# Patient Record
Sex: FEMALE | Race: WHITE | Employment: FULL TIME | URBAN - METROPOLITAN AREA
[De-identification: names, ages, dates, MRNs, and addresses within clinical notes are randomized per-mention and may not be internally consistent; named-entity substitution may affect disease eponyms.]

---

## 2017-06-01 ENCOUNTER — OFFICE VISIT (OUTPATIENT)
Dept: PRIMARY CARE CLINIC | Age: 42
End: 2017-06-01

## 2017-06-01 VITALS
HEART RATE: 72 BPM | RESPIRATION RATE: 14 BRPM | DIASTOLIC BLOOD PRESSURE: 60 MMHG | TEMPERATURE: 98.1 F | BODY MASS INDEX: 21.71 KG/M2 | SYSTOLIC BLOOD PRESSURE: 106 MMHG | HEIGHT: 62 IN | WEIGHT: 118 LBS

## 2017-06-01 DIAGNOSIS — K58.9 IRRITABLE BOWEL SYNDROME, UNSPECIFIED TYPE: ICD-10-CM

## 2017-06-01 DIAGNOSIS — K29.60 EROSIVE GASTRITIS: ICD-10-CM

## 2017-06-01 DIAGNOSIS — K64.8 INTERNAL HEMORRHOIDS: ICD-10-CM

## 2017-06-01 DIAGNOSIS — F41.9 ANXIETY: Primary | ICD-10-CM

## 2017-06-01 DIAGNOSIS — M79.7 FIBROMYALGIA: ICD-10-CM

## 2017-06-01 DIAGNOSIS — K76.0 FATTY LIVER: ICD-10-CM

## 2017-06-01 DIAGNOSIS — K86.89 PANCREATIC MASS: ICD-10-CM

## 2017-06-01 DIAGNOSIS — Z72.0 TOBACCO ABUSE: ICD-10-CM

## 2017-06-01 PROCEDURE — 99203 OFFICE O/P NEW LOW 30 MIN: CPT | Performed by: FAMILY MEDICINE

## 2017-06-01 RX ORDER — PANTOPRAZOLE SODIUM 40 MG/1
40 TABLET, DELAYED RELEASE ORAL DAILY
Qty: 30 TABLET | Refills: 11 | Status: SHIPPED | OUTPATIENT
Start: 2017-06-01 | End: 2018-07-10 | Stop reason: SDUPTHER

## 2017-06-01 RX ORDER — DULOXETIN HYDROCHLORIDE 60 MG/1
60 CAPSULE, DELAYED RELEASE ORAL DAILY
Qty: 90 CAPSULE | Refills: 3 | Status: SHIPPED | OUTPATIENT
Start: 2017-06-01 | End: 2017-06-29

## 2017-06-01 RX ORDER — DULOXETIN HYDROCHLORIDE 30 MG/1
30 CAPSULE, DELAYED RELEASE ORAL DAILY
Qty: 7 CAPSULE | Refills: 0 | Status: SHIPPED | OUTPATIENT
Start: 2017-06-01 | End: 2017-06-29 | Stop reason: SDUPTHER

## 2017-06-01 ASSESSMENT — ENCOUNTER SYMPTOMS
JAUNDICE: 0
TENESMUS: 0
HEMATEMESIS: 0
NAUSEA: 0
VOMITING: 0
ODYNOPHAGIA: 0
CONSTIPATION: 0
HEMATOCHEZIA: 0
ABDOMINAL PAIN: 0
DIARRHEA: 0
BACK PAIN: 0
BLOATING: 0

## 2017-06-01 ASSESSMENT — PATIENT HEALTH QUESTIONNAIRE - PHQ9
SUM OF ALL RESPONSES TO PHQ9 QUESTIONS 1 & 2: 2
2. FEELING DOWN, DEPRESSED OR HOPELESS: 1
SUM OF ALL RESPONSES TO PHQ QUESTIONS 1-9: 2
1. LITTLE INTEREST OR PLEASURE IN DOING THINGS: 1

## 2017-06-29 ENCOUNTER — OFFICE VISIT (OUTPATIENT)
Dept: PRIMARY CARE CLINIC | Age: 42
End: 2017-06-29

## 2017-06-29 VITALS
DIASTOLIC BLOOD PRESSURE: 52 MMHG | SYSTOLIC BLOOD PRESSURE: 88 MMHG | TEMPERATURE: 98.1 F | WEIGHT: 116 LBS | BODY MASS INDEX: 22.78 KG/M2 | RESPIRATION RATE: 12 BRPM | HEIGHT: 60 IN | HEART RATE: 64 BPM

## 2017-06-29 DIAGNOSIS — K64.8 INTERNAL HEMORRHOIDS: ICD-10-CM

## 2017-06-29 DIAGNOSIS — K29.60 EROSIVE GASTRITIS: ICD-10-CM

## 2017-06-29 DIAGNOSIS — K58.1 IRRITABLE BOWEL SYNDROME WITH CONSTIPATION: ICD-10-CM

## 2017-06-29 DIAGNOSIS — F41.9 ANXIETY: Primary | ICD-10-CM

## 2017-06-29 DIAGNOSIS — K86.89 PANCREATIC MASS: ICD-10-CM

## 2017-06-29 PROCEDURE — 99214 OFFICE O/P EST MOD 30 MIN: CPT | Performed by: FAMILY MEDICINE

## 2017-06-29 RX ORDER — DULOXETIN HYDROCHLORIDE 30 MG/1
30 CAPSULE, DELAYED RELEASE ORAL DAILY
Qty: 7 CAPSULE | Refills: 0 | Status: SHIPPED | OUTPATIENT
Start: 2017-06-29 | End: 2017-08-01 | Stop reason: ALTCHOICE

## 2017-06-29 RX ORDER — CITALOPRAM 40 MG/1
40 TABLET ORAL DAILY
Qty: 30 TABLET | Refills: 5 | Status: SHIPPED | OUTPATIENT
Start: 2017-06-29 | End: 2017-12-23 | Stop reason: SDUPTHER

## 2017-06-29 RX ORDER — PREGABALIN 75 MG/1
75 CAPSULE ORAL 2 TIMES DAILY
Qty: 60 CAPSULE | Refills: 3 | Status: SHIPPED | OUTPATIENT
Start: 2017-06-29 | End: 2017-11-09 | Stop reason: SDUPTHER

## 2017-06-29 ASSESSMENT — ENCOUNTER SYMPTOMS
EYE DISCHARGE: 0
DIARRHEA: 0
APNEA: 0
CHEST TIGHTNESS: 0
COLOR CHANGE: 0
STRIDOR: 0
NAUSEA: 0
EYE PAIN: 0
PHOTOPHOBIA: 0
EYE REDNESS: 0
FACIAL SWELLING: 0
FLATUS: 0
CONSTIPATION: 1
HEMATOCHEZIA: 0
BELCHING: 0
ABDOMINAL PAIN: 1
VOMITING: 0
CHOKING: 0
WHEEZING: 0

## 2017-07-07 ENCOUNTER — OFFICE VISIT (OUTPATIENT)
Dept: GASTROENTEROLOGY | Age: 42
End: 2017-07-07

## 2017-07-07 VITALS
OXYGEN SATURATION: 97 % | HEART RATE: 65 BPM | HEIGHT: 60 IN | TEMPERATURE: 97.9 F | BODY MASS INDEX: 23.56 KG/M2 | RESPIRATION RATE: 14 BRPM | WEIGHT: 120 LBS | SYSTOLIC BLOOD PRESSURE: 90 MMHG | DIASTOLIC BLOOD PRESSURE: 60 MMHG

## 2017-07-07 DIAGNOSIS — Z87.11 HISTORY OF PEPTIC ULCER DISEASE: ICD-10-CM

## 2017-07-07 DIAGNOSIS — K58.1 IRRITABLE BOWEL SYNDROME WITH CONSTIPATION: ICD-10-CM

## 2017-07-07 DIAGNOSIS — K86.9 LESION OF PANCREAS: Primary | ICD-10-CM

## 2017-07-07 PROCEDURE — 99204 OFFICE O/P NEW MOD 45 MIN: CPT | Performed by: INTERNAL MEDICINE

## 2017-07-07 RX ORDER — DIPHENHYDRAMINE HCL 25 MG
25 TABLET ORAL EVERY 6 HOURS PRN
COMMUNITY
End: 2019-01-31

## 2017-07-07 ASSESSMENT — ENCOUNTER SYMPTOMS
DIARRHEA: 0
EYES NEGATIVE: 1
RECTAL PAIN: 0
ABDOMINAL PAIN: 0
VOMITING: 0
BLOOD IN STOOL: 0
ANAL BLEEDING: 0
RESPIRATORY NEGATIVE: 1
NAUSEA: 0
CONSTIPATION: 0
ABDOMINAL DISTENTION: 0

## 2017-08-01 ENCOUNTER — OFFICE VISIT (OUTPATIENT)
Dept: PRIMARY CARE CLINIC | Age: 42
End: 2017-08-01

## 2017-08-01 VITALS
OXYGEN SATURATION: 98 % | DIASTOLIC BLOOD PRESSURE: 62 MMHG | WEIGHT: 116 LBS | TEMPERATURE: 97.8 F | RESPIRATION RATE: 16 BRPM | BODY MASS INDEX: 22.78 KG/M2 | HEIGHT: 60 IN | SYSTOLIC BLOOD PRESSURE: 94 MMHG | HEART RATE: 82 BPM

## 2017-08-01 DIAGNOSIS — R10.84 GENERALIZED ABDOMINAL PAIN: ICD-10-CM

## 2017-08-01 DIAGNOSIS — K58.1 IRRITABLE BOWEL SYNDROME WITH CONSTIPATION: Primary | ICD-10-CM

## 2017-08-01 DIAGNOSIS — K86.89 PANCREATIC MASS: ICD-10-CM

## 2017-08-01 DIAGNOSIS — R11.0 NAUSEA: ICD-10-CM

## 2017-08-01 DIAGNOSIS — Z72.0 TOBACCO ABUSE: ICD-10-CM

## 2017-08-01 DIAGNOSIS — F41.9 ANXIETY: ICD-10-CM

## 2017-08-01 PROCEDURE — 99214 OFFICE O/P EST MOD 30 MIN: CPT | Performed by: FAMILY MEDICINE

## 2017-08-01 RX ORDER — ONDANSETRON 4 MG/1
4 TABLET, ORALLY DISINTEGRATING ORAL EVERY 8 HOURS PRN
Qty: 30 TABLET | Refills: 3 | Status: SHIPPED | OUTPATIENT
Start: 2017-08-01 | End: 2017-12-27 | Stop reason: SDUPTHER

## 2017-08-01 ASSESSMENT — ENCOUNTER SYMPTOMS
APNEA: 0
STRIDOR: 0
WHEEZING: 0
EYE PAIN: 0
EYE REDNESS: 0
FLATUS: 0
CHEST TIGHTNESS: 0
ABDOMINAL PAIN: 1
HEMATOCHEZIA: 0
DIARRHEA: 0
NAUSEA: 0
VOMITING: 0
PHOTOPHOBIA: 0
CHOKING: 0
COLOR CHANGE: 0
BELCHING: 0
CONSTIPATION: 0
FACIAL SWELLING: 0
EYE DISCHARGE: 0

## 2017-09-18 ENCOUNTER — OFFICE VISIT (OUTPATIENT)
Dept: PRIMARY CARE CLINIC | Age: 42
End: 2017-09-18

## 2017-09-18 VITALS
SYSTOLIC BLOOD PRESSURE: 90 MMHG | HEART RATE: 76 BPM | RESPIRATION RATE: 12 BRPM | BODY MASS INDEX: 21.6 KG/M2 | WEIGHT: 110 LBS | DIASTOLIC BLOOD PRESSURE: 62 MMHG | TEMPERATURE: 98.3 F | HEIGHT: 60 IN

## 2017-09-18 DIAGNOSIS — K58.1 IRRITABLE BOWEL SYNDROME WITH CONSTIPATION: ICD-10-CM

## 2017-09-18 DIAGNOSIS — Z23 NEED FOR INFLUENZA VACCINATION: ICD-10-CM

## 2017-09-18 DIAGNOSIS — K29.51 CHRONIC GASTRITIS WITH BLEEDING, UNSPECIFIED GASTRITIS TYPE: ICD-10-CM

## 2017-09-18 DIAGNOSIS — Z72.0 TOBACCO ABUSE: ICD-10-CM

## 2017-09-18 DIAGNOSIS — R10.84 GENERALIZED ABDOMINAL PAIN: ICD-10-CM

## 2017-09-18 DIAGNOSIS — M54.42 ACUTE MIDLINE LOW BACK PAIN WITH LEFT-SIDED SCIATICA: ICD-10-CM

## 2017-09-18 DIAGNOSIS — R11.0 NAUSEA: ICD-10-CM

## 2017-09-18 DIAGNOSIS — J40 BRONCHITIS: Primary | ICD-10-CM

## 2017-09-18 DIAGNOSIS — R05.8 PRODUCTIVE COUGH: ICD-10-CM

## 2017-09-18 DIAGNOSIS — R63.4 WEIGHT LOSS: ICD-10-CM

## 2017-09-18 PROCEDURE — 99214 OFFICE O/P EST MOD 30 MIN: CPT | Performed by: FAMILY MEDICINE

## 2017-09-18 PROCEDURE — 90688 IIV4 VACCINE SPLT 0.5 ML IM: CPT | Performed by: FAMILY MEDICINE

## 2017-09-18 PROCEDURE — 90471 IMMUNIZATION ADMIN: CPT | Performed by: FAMILY MEDICINE

## 2017-09-18 RX ORDER — CEFUROXIME AXETIL 500 MG/1
500 TABLET ORAL 2 TIMES DAILY
Qty: 20 TABLET | Refills: 0 | Status: SHIPPED | OUTPATIENT
Start: 2017-09-18 | End: 2017-09-28

## 2017-09-18 ASSESSMENT — ENCOUNTER SYMPTOMS
EYE DISCHARGE: 0
WHEEZING: 1
COLOR CHANGE: 0
EYE PAIN: 0
NAUSEA: 1
BACK PAIN: 1
BOWEL INCONTINENCE: 0
HEMOPTYSIS: 0
RHINORRHEA: 0
SORE THROAT: 0
STRIDOR: 0
EYE REDNESS: 0
CHEST TIGHTNESS: 1
CHOKING: 0
PHOTOPHOBIA: 0
ABDOMINAL PAIN: 0
DIARRHEA: 0
CONSTIPATION: 0
FACIAL SWELLING: 0
APNEA: 0
COUGH: 1
HEARTBURN: 0
SHORTNESS OF BREATH: 1

## 2017-09-27 ENCOUNTER — HOSPITAL ENCOUNTER (OUTPATIENT)
Dept: CT IMAGING | Age: 42
Discharge: HOME | End: 2017-09-27
Payer: COMMERCIAL

## 2017-09-27 VITALS
HEIGHT: 60 IN | DIASTOLIC BLOOD PRESSURE: 60 MMHG | SYSTOLIC BLOOD PRESSURE: 93 MMHG | HEART RATE: 60 BPM | BODY MASS INDEX: 21.4 KG/M2 | WEIGHT: 109 LBS

## 2017-09-27 DIAGNOSIS — J40 BRONCHITIS: ICD-10-CM

## 2017-09-27 DIAGNOSIS — R11.0 NAUSEA: ICD-10-CM

## 2017-09-27 DIAGNOSIS — R10.84 GENERALIZED ABDOMINAL PAIN: ICD-10-CM

## 2017-09-27 DIAGNOSIS — R05.8 PRODUCTIVE COUGH: ICD-10-CM

## 2017-09-27 DIAGNOSIS — K86.89 PANCREATIC MASS: ICD-10-CM

## 2017-09-27 DIAGNOSIS — K58.1 IRRITABLE BOWEL SYNDROME WITH CONSTIPATION: ICD-10-CM

## 2017-09-27 DIAGNOSIS — R63.4 WEIGHT LOSS: ICD-10-CM

## 2017-09-27 PROCEDURE — 71260 CT THORAX DX C+: CPT

## 2017-09-27 PROCEDURE — 74177 CT ABD & PELVIS W/CONTRAST: CPT

## 2017-09-27 PROCEDURE — 2500000003 HC RX 250 WO HCPCS: Performed by: RADIOLOGY

## 2017-09-27 PROCEDURE — 6360000004 HC RX CONTRAST MEDICATION: Performed by: RADIOLOGY

## 2017-09-27 PROCEDURE — 2580000003 HC RX 258: Performed by: RADIOLOGY

## 2017-09-27 PROCEDURE — 71270 CT THORAX DX C-/C+: CPT

## 2017-09-27 RX ORDER — SODIUM CHLORIDE 0.9 % (FLUSH) 0.9 %
10 SYRINGE (ML) INJECTION ONCE
Status: COMPLETED | OUTPATIENT
Start: 2017-09-27 | End: 2017-09-27

## 2017-09-27 RX ADMIN — IOPAMIDOL 100 ML: 755 INJECTION, SOLUTION INTRAVENOUS at 16:40

## 2017-09-27 RX ADMIN — SODIUM CHLORIDE, PRESERVATIVE FREE 10 ML: 5 INJECTION INTRAVENOUS at 16:40

## 2017-09-27 RX ADMIN — BARIUM SULFATE 450 ML: 21 SUSPENSION ORAL at 16:40

## 2017-09-28 ENCOUNTER — OFFICE VISIT (OUTPATIENT)
Dept: GASTROENTEROLOGY | Age: 42
End: 2017-09-28

## 2017-09-28 VITALS
HEIGHT: 60 IN | DIASTOLIC BLOOD PRESSURE: 60 MMHG | OXYGEN SATURATION: 99 % | WEIGHT: 113 LBS | HEART RATE: 59 BPM | RESPIRATION RATE: 12 BRPM | BODY MASS INDEX: 22.19 KG/M2 | SYSTOLIC BLOOD PRESSURE: 90 MMHG

## 2017-09-28 DIAGNOSIS — R11.0 NAUSEA: ICD-10-CM

## 2017-09-28 DIAGNOSIS — K86.89 PANCREATIC MASS: Primary | ICD-10-CM

## 2017-09-28 DIAGNOSIS — K86.89 PANCREATIC MASS: ICD-10-CM

## 2017-09-28 DIAGNOSIS — K58.1 IRRITABLE BOWEL SYNDROME WITH CONSTIPATION: ICD-10-CM

## 2017-09-28 LAB
AMYLASE: 20 U/L (ref 28–100)
HCT VFR BLD CALC: 43.1 % (ref 37–47)
HEMOGLOBIN: 14.1 G/DL (ref 12–16)
LIPASE: 19 U/L (ref 13–60)
MCH RBC QN AUTO: 29.6 PG (ref 27–31.3)
MCHC RBC AUTO-ENTMCNC: 32.6 % (ref 33–37)
MCV RBC AUTO: 90.8 FL (ref 82–100)
PDW BLD-RTO: 14.2 % (ref 11.5–14.5)
PLATELET # BLD: 197 K/UL (ref 130–400)
RBC # BLD: 4.74 M/UL (ref 4.2–5.4)
WBC # BLD: 9 K/UL (ref 4.8–10.8)

## 2017-09-28 PROCEDURE — 99214 OFFICE O/P EST MOD 30 MIN: CPT | Performed by: PHYSICIAN ASSISTANT

## 2017-09-28 ASSESSMENT — ENCOUNTER SYMPTOMS
CONSTIPATION: 1
ABDOMINAL PAIN: 1

## 2017-09-28 NOTE — MR AVS SNAPSHOT
Linaclotide (LINZESS) 72 MCG CAPS Take 1 capsule by mouth daily as needed (constipation)    ondansetron (ZOFRAN ODT) 4 MG disintegrating tablet Take 1 tablet by mouth every 8 hours as needed for Nausea or Vomiting    diphenhydrAMINE (BENADRYL) 25 MG tablet Take 25 mg by mouth every 6 hours as needed for Itching    pregabalin (LYRICA) 75 MG capsule Take 1 capsule by mouth 2 times daily    citalopram (CELEXA) 40 MG tablet Take 1 tablet by mouth daily    pantoprazole (PROTONIX) 40 MG tablet Take 1 tablet by mouth daily      Allergies              Asa [Aspirin]     Demerol Hcl [Meperidine]     Morphine     Nsaids     Other     All oral narcotics    Xanax [Alprazolam]     Zoloft [Sertraline Hcl]       We Ordered/Performed the following           Comprehensive Metabolic Panel          Additional Information        Basic Information     Date Of Birth Sex Race Ethnicity Preferred Language    1975 Female White Non-/Non  English      Problem List as of 9/28/2017                 Pancreatic mass, 7mm    Fatty liver    Tobacco abuse    Internal hemorrhoids    Erosive gastritis    Irritable bowel syndrome      Immunizations as of 9/28/2017     Name Date    Influenza, Tavernier Stefaniey, 3 Years and older, IM 9/18/2017      Preventive Care        Date Due    HIV screening is recommended for all people regardless of risk factors  aged 15-65 years at least once (lifetime) who have never been HIV tested. 12/19/1990    Tetanus Combination Vaccine (1 - Tdap) 12/19/1994    Pneumococcal Vaccine - Pneumovax for adults aged 19-64 years with: chronic heart disease, chronic lung disease, diabetes mellitus, alcoholism, chronic liver disease, or cigarette smoking. (1 of 1 - PPSV23) 12/19/1994    Pap Smear 12/19/1996    Cholesterol Screening 12/19/2015            MyChart Signup           Our records indicate that you have an active [employer] account.     You can view your After Visit Summary by going to [email or web address] and logging in with your M[employer] username and password. If you don't have a M[employer] username and password but a parent or guardian has access to your record, the parent or guardian should login with their own M[employer] username and password and access your record to view the After Visit Summary. Additional Information  If you have questions, please contact the physician practice where you receive care. Remember, M[employer] is NOT to be used for urgent needs. For medical emergencies, dial 911. For questions regarding your M[employer] account call [phone]. If you have a clinical question, please call your doctor's office.

## 2017-09-28 NOTE — PROGRESS NOTES
Problem List   Diagnosis    Pancreatic mass, 7mm    Fatty liver    Tobacco abuse    Internal hemorrhoids    Erosive gastritis    Irritable bowel syndrome    Nausea     Past Medical History:   Diagnosis Date    Anxiety     Attempted [facility] ([facility] [address]     Depression     Diverticulosis     Erosive gastritis 6/1/2017    Fatty liver     Fatty pancreas     Fibromyalgia     Gastritis     Internal hemorrhoids 6/1/2017    Irritable bowel syndrome     Osteoarthritis     Pancreatic lesion     Pancreatic mass, 7mm 6/1/2017    Past heart attack     Tobacco abuse 6/1/2017     Past Surgical History:   Procedure Laterality Date    BREAST LUMPECTOMY Bilateral     CHOLECYSTECTOMY      COLONOSCOPY      HYSTERECTOMY      TUBAL LIGATION      UPPER GASTROINTESTINAL ENDOSCOPY      WISDOM TOOTH EXTRACTION       Social History     Social History    Marital status: Single     Spouse name: N/A    Number of children: N/A    Years of education: N/A     Occupational History    Not on file. Social History Main Topics    Smoking status: Current Every Day Smoker     Packs/day: 0.50    Smokeless tobacco: Never Used    Alcohol use No    Drug use: Yes     Special: Marijuana    Sexual activity: Not on file     Other Topics Concern    Not on file     Social History Narrative     Family History   Problem Relation Age of Onset    Cancer Mother      ovarian    Asthma Mother     Other Mother      copd,emphysema    Cancer Maternal Grandmother      ovarian    Asthma Maternal Grandmother     Other Maternal Grandmother      copd,emphysema     Allergies:  Asa [aspirin]; Demerol hcl [meperidine]; Morphine; Nsaids;  Other; Xanax [alprazolam]; and Zoloft [sertraline hcl]  Current Outpatient Prescriptions   Medication Sig Dispense Refill    Linaclotide (LINZESS) 72 MCG CAPS Take 1 capsule by mouth daily as needed (constipation) 30 capsule 3    ondansetron (ZOFRAN ODT) 4 MG disintegrating tablet Take 1 tablet by mouth every 8 hours as needed for Nausea or Vomiting 30 tablet 3    diphenhydrAMINE (BENADRYL) 25 MG tablet Take 25 mg by mouth every 6 hours as needed for Itching      pregabalin (LYRICA) 75 MG capsule Take 1 capsule by mouth 2 times daily 60 capsule 3    citalopram (CELEXA) 40 MG tablet Take 1 tablet by mouth daily 30 tablet 5    pantoprazole (PROTONIX) 40 MG tablet Take 1 tablet by mouth daily 30 tablet 11     No current facility-administered medications for this visit. Review of Systems   Constitutional: Positive for weight loss. Negative for chills, diaphoresis, fatigue, fever and unexpected weight change. Respiratory: Negative for apnea, cough, chest tightness, shortness of breath and wheezing. Cardiovascular: Negative for chest pain, palpitations and leg swelling. Gastrointestinal: Positive for abdominal pain, anorexia, constipation and nausea. Negative for abdominal distention, anal bleeding, blood in stool, diarrhea, hematochezia, melena, rectal pain and vomiting. Skin: Negative for color change, pallor, rash and wound. Neurological: Negative for dizziness, tremors, seizures, syncope, speech difficulty, weakness, light-headedness and headaches. Psychiatric/Behavioral: Negative for agitation and confusion. The patient is nervous/anxious. All other systems reviewed and are negative. Objective:     Vitals:  BP 90/60  Pulse 59  Resp 12  Ht 5' (1.524 m)  Wt 113 lb (51.3 kg)  SpO2 99%  BMI 22.07 kg/m2  Body mass index is 22.07 kg/(m^2). Physical Exam   Constitutional: She is oriented to person, place, and time. She appears well-developed and well-nourished. No distress. HENT:   Head: Normocephalic and atraumatic. Neck: Normal range of motion. Neck supple. No JVD present. No tracheal deviation present. No thyromegaly present. Cardiovascular: Normal rate, regular rhythm and normal heart sounds.     Pulmonary/Chest: Effort normal and breath sounds normal. No respiratory

## 2017-10-02 PROBLEM — R11.0 NAUSEA: Status: ACTIVE | Noted: 2017-10-02

## 2017-10-02 ASSESSMENT — ENCOUNTER SYMPTOMS
NAUSEA: 1
DIARRHEA: 0
WHEEZING: 0
CHEST TIGHTNESS: 0
BLOOD IN STOOL: 0
ANAL BLEEDING: 0
ABDOMINAL DISTENTION: 0
COLOR CHANGE: 0
VOMITING: 0
APNEA: 0
COUGH: 0
RECTAL PAIN: 0
SHORTNESS OF BREATH: 0
HEMATOCHEZIA: 0

## 2017-10-03 ENCOUNTER — OFFICE VISIT (OUTPATIENT)
Dept: PRIMARY CARE CLINIC | Age: 42
End: 2017-10-03

## 2017-10-03 VITALS
RESPIRATION RATE: 16 BRPM | WEIGHT: 114 LBS | SYSTOLIC BLOOD PRESSURE: 90 MMHG | DIASTOLIC BLOOD PRESSURE: 54 MMHG | BODY MASS INDEX: 22.38 KG/M2 | HEIGHT: 60 IN | HEART RATE: 64 BPM | TEMPERATURE: 98.7 F

## 2017-10-03 DIAGNOSIS — K86.89 PANCREATIC MASS: ICD-10-CM

## 2017-10-03 DIAGNOSIS — R63.4 WEIGHT LOSS: ICD-10-CM

## 2017-10-03 DIAGNOSIS — R53.83 FATIGUE, UNSPECIFIED TYPE: ICD-10-CM

## 2017-10-03 DIAGNOSIS — Z72.0 TOBACCO ABUSE: ICD-10-CM

## 2017-10-03 DIAGNOSIS — K29.60 EROSIVE GASTRITIS: ICD-10-CM

## 2017-10-03 DIAGNOSIS — K58.1 IRRITABLE BOWEL SYNDROME WITH CONSTIPATION: Primary | ICD-10-CM

## 2017-10-03 LAB
ALBUMIN SERPL-MCNC: 4.4 G/DL (ref 3.9–4.9)
ALP BLD-CCNC: 46 U/L (ref 40–130)
ALT SERPL-CCNC: 12 U/L (ref 0–33)
ANION GAP SERPL CALCULATED.3IONS-SCNC: 14 MEQ/L (ref 7–13)
AST SERPL-CCNC: 19 U/L (ref 0–35)
BILIRUB SERPL-MCNC: 0.3 MG/DL (ref 0–1.2)
BUN BLDV-MCNC: 13 MG/DL (ref 6–20)
CALCIUM SERPL-MCNC: 9.1 MG/DL (ref 8.6–10.2)
CHLORIDE BLD-SCNC: 104 MEQ/L (ref 98–107)
CO2: 23 MEQ/L (ref 22–29)
CREAT SERPL-MCNC: 0.58 MG/DL (ref 0.5–0.9)
GFR AFRICAN AMERICAN: >60
GFR NON-AFRICAN AMERICAN: >60
GLOBULIN: 2.3 G/DL (ref 2.3–3.5)
GLUCOSE BLD-MCNC: 97 MG/DL (ref 74–109)
POTASSIUM SERPL-SCNC: 4.6 MEQ/L (ref 3.5–5.1)
SODIUM BLD-SCNC: 141 MEQ/L (ref 132–144)
T4 FREE: 0.87 NG/DL (ref 0.93–1.7)
T4 TOTAL: 5.9 UG/DL (ref 4.5–11.7)
TOTAL PROTEIN: 6.7 G/DL (ref 6.4–8.1)
TSH SERPL DL<=0.05 MIU/L-ACNC: 1.96 UIU/ML (ref 0.27–4.2)

## 2017-10-03 PROCEDURE — 99213 OFFICE O/P EST LOW 20 MIN: CPT | Performed by: FAMILY MEDICINE

## 2017-10-03 ASSESSMENT — ENCOUNTER SYMPTOMS
WHEEZING: 0
EYE DISCHARGE: 0
CHEST TIGHTNESS: 0
COLOR CHANGE: 0
DIARRHEA: 0
EYE REDNESS: 0
NAUSEA: 0
CHOKING: 0
STRIDOR: 0
FACIAL SWELLING: 0
PHOTOPHOBIA: 0
CONSTIPATION: 0
APNEA: 0
ABDOMINAL PAIN: 1
EYE PAIN: 0

## 2017-10-03 NOTE — PROGRESS NOTES
 Not on file     Social History Narrative     Allergies:  Asa [aspirin]; Demerol hcl [meperidine]; Morphine; Nsaids; Other; Xanax [alprazolam]; and Zoloft [sertraline hcl]    Review of Systems   Constitutional: Positive for appetite change, fatigue and unexpected weight change. Negative for activity change, chills, diaphoresis and fever. HENT: Negative for congestion, ear discharge, ear pain, facial swelling, hearing loss and mouth sores. Eyes: Negative for photophobia, pain, discharge and redness. Respiratory: Negative for apnea, choking, chest tightness, wheezing and stridor. Cardiovascular: Negative for chest pain, palpitations and leg swelling. Gastrointestinal: Positive for abdominal pain. Negative for constipation, diarrhea and nausea. Endocrine: Negative for cold intolerance, heat intolerance, polydipsia and polyphagia. Genitourinary: Negative for dysuria and frequency. Musculoskeletal: Negative for gait problem, joint swelling, neck pain and neck stiffness. Skin: Negative for color change, pallor, rash and wound. Allergic/Immunologic: Negative for immunocompromised state. Neurological: Negative for tremors, syncope, facial asymmetry, speech difficulty and headaches. Psychiatric/Behavioral: Negative for confusion and hallucinations. The patient is not nervous/anxious and is not hyperactive. Objective:   BP (!) 90/54 (Site: Left Arm, Position: Sitting, Cuff Size: Medium Adult)  Pulse 64  Temp 98.7 °F (37.1 °C) (Oral)   Resp 16  Ht 5' (1.524 m)  Wt 114 lb (51.7 kg)  BMI 22.26 kg/m2    Physical Exam   Constitutional: She is oriented to person, place, and time. She appears well-developed and well-nourished. HENT:   Head: Normocephalic and atraumatic. Right Ear: External ear normal.   Left Ear: External ear normal.   Nose: Nose normal.   Mouth/Throat: Oropharynx is clear and moist.   Eyes: Conjunctivae and EOM are normal. Pupils are equal, round, and reactive to light. Right eye exhibits no discharge. Left eye exhibits no discharge. No scleral icterus. Neck: Normal range of motion. Neck supple. No thyromegaly present. Cardiovascular: Normal rate, regular rhythm, normal heart sounds and intact distal pulses. Exam reveals no gallop and no friction rub. No murmur heard. Pulmonary/Chest: Effort normal and breath sounds normal. No respiratory distress. She has no wheezes. She has no rales. She exhibits no tenderness. Abdominal: Soft. Bowel sounds are normal. She exhibits no distension and no mass. There is tenderness in the right upper quadrant, right lower quadrant and periumbilical area. There is no rebound and no guarding. Lymphadenopathy:     She has no cervical adenopathy. Neurological: She is alert and oriented to person, place, and time. Skin: Skin is warm and dry. Psychiatric: She has a normal mood and affect. Her behavior is normal. Judgment and thought content normal.   Nursing note and vitals reviewed. Assessment:     1. Irritable bowel syndrome with constipation     2. Erosive gastritis, EGD tomorrow w/ Dr Amy Tavarez   3. Pancreatic mass, 7mm  Comprehensive Metabolic Panel   4. Fatigue, unspecified type  Comprehensive Metabolic Panel    T4    T4, Free    TSH without Reflex   5. Weight loss  Comprehensive Metabolic Panel    T4    T4, Free    TSH without Reflex   6. Tobacco abuse         Plan:      Orders Placed This Encounter   Procedures    Comprehensive Metabolic Panel     Standing Status:   Future     Standing Expiration Date:   10/3/2018    T4     Standing Status:   Future     Standing Expiration Date:   10/3/2018    T4, Free     Standing Status:   Future     Standing Expiration Date:   10/3/2018    TSH without Reflex     Standing Status:   Future     Standing Expiration Date:   10/3/2018     No orders of the defined types were placed in this encounter.       Controlled Substances Monitoring:      No Follow-up on file.    Heather Brown CMA   , am scribing for and in the presence of Joel Hamilton DO. Electronically signed by :  Sully Mujica DO, personally performed the services described in this documentation, as scribed by Kelin Simon CMA   in my presence, and it is both accurate and complete.  Electronically signed by: Joel Hamilton DO    10/3/17 2:26 PM    Joel Hamilton DO

## 2017-10-03 NOTE — MR AVS SNAPSHOT
After Visit Summary             Kai Monroy   10/3/2017 2:15 PM   Office Visit    Description:  Female : 1975   Provider:  Anne House DO   Department:  [facility]              Your Follow-Up and Future Appointments         Below is a list of your follow-up and future appointments. This may not be a complete list as you may have made appointments directly with providers that we are not aware of or your providers may have made some for you. Please call your providers to confirm appointments. It is important to keep your appointments. Please bring your current insurance card, photo ID, co-pay, and all medication bottles to your appointment. If self-pay, payment is expected at the time of service. Your To-Do List     Future Appointments Provider Department Dept Phone    10/21/2017 10:30 AM Luís Wright DO [address] [place] [address] [phone]    Future Orders Complete By Expires    Comprehensive Metabolic Panel [FLD01 Custom]  10/3/2017 10/3/2018    T4, Free [BVN897 Custom]  10/3/2017 10/3/2018    T4 [EPN708 Custom]  10/3/2017 10/3/2018    TSH without Reflex [XLG197 Custom]  10/3/2017 10/3/2018    Follow-Up    Return for per Dr Valentin Roman.          Information from Your Visit        Department     Name Address Phone Fax    [facility] [address]      You Were Seen for:         Comments    Irritable bowel syndrome with constipation   [615421]         Vital Signs     Blood Pressure Pulse Temperature Respirations Height Weight    90/54 (Site: Left Arm, Position: Sitting, Cuff Size: Medium Adult) 64 98.7 °F (37.1 °C) (Oral) 16 5' (1.524 m) 114 lb (51.7 kg)    Body Mass Index Smoking Status                22.26 kg/m2 Current Every Day Smoker             Medications and Orders      Your Current Medications Are              Linaclotide (LINZESS) 72 MCG CAPS Take 1 capsule by mouth daily as needed (constipation) guardian has access to your record, the parent or guardian should login with their own [employer] username and password and access your record to view the After Visit Summary. Additional Information  If you have questions, please contact the physician practice where you receive care. Remember, [employer] is NOT to be used for urgent needs. For medical emergencies, dial 911. For questions regarding your [employer] account call [phone]. If you have a clinical question, please call your doctor's office.

## 2017-10-04 ENCOUNTER — OUTSIDE SERVICES (OUTPATIENT)
Dept: GASTROENTEROLOGY | Age: 42
End: 2017-10-04

## 2017-10-26 ENCOUNTER — HOSPITAL ENCOUNTER (OUTPATIENT)
Age: 42
Setting detail: OUTPATIENT SURGERY
Discharge: HOME | End: 2017-10-26
Attending: INTERNAL MEDICINE | Admitting: INTERNAL MEDICINE
Payer: COMMERCIAL

## 2017-10-26 ENCOUNTER — ANESTHESIA EVENT (OUTPATIENT)
Dept: OPERATING ROOM | Age: 42
End: 2017-10-26
Payer: COMMERCIAL

## 2017-10-26 ENCOUNTER — ANESTHESIA (OUTPATIENT)
Dept: OPERATING ROOM | Age: 42
End: 2017-10-26
Payer: COMMERCIAL

## 2017-10-26 VITALS
TEMPERATURE: 98.6 F | DIASTOLIC BLOOD PRESSURE: 51 MMHG | HEART RATE: 63 BPM | BODY MASS INDEX: 21.99 KG/M2 | SYSTOLIC BLOOD PRESSURE: 94 MMHG | HEIGHT: 60 IN | RESPIRATION RATE: 16 BRPM | WEIGHT: 112 LBS | OXYGEN SATURATION: 97 %

## 2017-10-26 VITALS — OXYGEN SATURATION: 100 % | DIASTOLIC BLOOD PRESSURE: 58 MMHG | SYSTOLIC BLOOD PRESSURE: 96 MMHG

## 2017-10-26 PROCEDURE — 2500000003 HC RX 250 WO HCPCS: Performed by: NURSE ANESTHETIST, CERTIFIED REGISTERED

## 2017-10-26 PROCEDURE — 3700000000 HC ANESTHESIA ATTENDED CARE: Performed by: INTERNAL MEDICINE

## 2017-10-26 PROCEDURE — 3609018700 HC ENDOSCOPIC ULTRASOUND: Performed by: INTERNAL MEDICINE

## 2017-10-26 PROCEDURE — 3609017100 HC EGD: Performed by: INTERNAL MEDICINE

## 2017-10-26 PROCEDURE — 7100000010 HC PHASE II RECOVERY - FIRST 15 MIN: Performed by: INTERNAL MEDICINE

## 2017-10-26 PROCEDURE — 7100000000 HC PACU RECOVERY - FIRST 15 MIN: Performed by: INTERNAL MEDICINE

## 2017-10-26 PROCEDURE — 2580000003 HC RX 258: Performed by: ANESTHESIOLOGY

## 2017-10-26 PROCEDURE — 43259 EGD US EXAM DUODENUM/JEJUNUM: CPT | Performed by: INTERNAL MEDICINE

## 2017-10-26 PROCEDURE — 7100000001 HC PACU RECOVERY - ADDTL 15 MIN: Performed by: INTERNAL MEDICINE

## 2017-10-26 PROCEDURE — 3700000001 HC ADD 15 MINUTES (ANESTHESIA): Performed by: INTERNAL MEDICINE

## 2017-10-26 PROCEDURE — 6360000002 HC RX W HCPCS: Performed by: NURSE ANESTHETIST, CERTIFIED REGISTERED

## 2017-10-26 RX ORDER — SODIUM CHLORIDE, SODIUM LACTATE, POTASSIUM CHLORIDE, CALCIUM CHLORIDE 600; 310; 30; 20 MG/100ML; MG/100ML; MG/100ML; MG/100ML
INJECTION, SOLUTION INTRAVENOUS CONTINUOUS
Status: DISCONTINUED | OUTPATIENT
Start: 2017-10-26 | End: 2017-10-26 | Stop reason: HOSPADM

## 2017-10-26 RX ORDER — LIDOCAINE HYDROCHLORIDE 10 MG/ML
1 INJECTION, SOLUTION EPIDURAL; INFILTRATION; INTRACAUDAL; PERINEURAL
Status: DISCONTINUED | OUTPATIENT
Start: 2017-10-26 | End: 2017-10-26 | Stop reason: HOSPADM

## 2017-10-26 RX ORDER — LIDOCAINE HYDROCHLORIDE 20 MG/ML
INJECTION, SOLUTION INFILTRATION; PERINEURAL PRN
Status: DISCONTINUED | OUTPATIENT
Start: 2017-10-26 | End: 2017-10-26 | Stop reason: SDUPTHER

## 2017-10-26 RX ORDER — HYDROCODONE BITARTRATE AND ACETAMINOPHEN 5; 325 MG/1; MG/1
2 TABLET ORAL PRN
Status: DISCONTINUED | OUTPATIENT
Start: 2017-10-26 | End: 2017-10-26 | Stop reason: HOSPADM

## 2017-10-26 RX ORDER — ONDANSETRON 2 MG/ML
4 INJECTION INTRAMUSCULAR; INTRAVENOUS
Status: DISCONTINUED | OUTPATIENT
Start: 2017-10-26 | End: 2017-10-26 | Stop reason: HOSPADM

## 2017-10-26 RX ORDER — HYDROCODONE BITARTRATE AND ACETAMINOPHEN 5; 325 MG/1; MG/1
1 TABLET ORAL PRN
Status: DISCONTINUED | OUTPATIENT
Start: 2017-10-26 | End: 2017-10-26 | Stop reason: HOSPADM

## 2017-10-26 RX ORDER — SODIUM CHLORIDE 0.9 % (FLUSH) 0.9 %
10 SYRINGE (ML) INJECTION PRN
Status: DISCONTINUED | OUTPATIENT
Start: 2017-10-26 | End: 2017-10-26 | Stop reason: HOSPADM

## 2017-10-26 RX ORDER — SODIUM CHLORIDE 0.9 % (FLUSH) 0.9 %
10 SYRINGE (ML) INJECTION EVERY 12 HOURS SCHEDULED
Status: DISCONTINUED | OUTPATIENT
Start: 2017-10-26 | End: 2017-10-26 | Stop reason: HOSPADM

## 2017-10-26 RX ORDER — PROPOFOL 10 MG/ML
INJECTION, EMULSION INTRAVENOUS PRN
Status: DISCONTINUED | OUTPATIENT
Start: 2017-10-26 | End: 2017-10-26 | Stop reason: SDUPTHER

## 2017-10-26 RX ORDER — LIDOCAINE HYDROCHLORIDE 10 MG/ML
1 INJECTION, SOLUTION EPIDURAL; INFILTRATION; INTRACAUDAL; PERINEURAL ONCE
Status: DISCONTINUED | OUTPATIENT
Start: 2017-10-26 | End: 2017-10-26 | Stop reason: HOSPADM

## 2017-10-26 RX ORDER — METOCLOPRAMIDE HYDROCHLORIDE 5 MG/ML
10 INJECTION INTRAMUSCULAR; INTRAVENOUS
Status: DISCONTINUED | OUTPATIENT
Start: 2017-10-26 | End: 2017-10-26 | Stop reason: HOSPADM

## 2017-10-26 RX ORDER — MEPERIDINE HYDROCHLORIDE 25 MG/ML
12.5 INJECTION INTRAMUSCULAR; INTRAVENOUS; SUBCUTANEOUS EVERY 5 MIN PRN
Status: DISCONTINUED | OUTPATIENT
Start: 2017-10-26 | End: 2017-10-26

## 2017-10-26 RX ORDER — DIPHENHYDRAMINE HYDROCHLORIDE 50 MG/ML
12.5 INJECTION INTRAMUSCULAR; INTRAVENOUS
Status: DISCONTINUED | OUTPATIENT
Start: 2017-10-26 | End: 2017-10-26 | Stop reason: HOSPADM

## 2017-10-26 RX ORDER — FENTANYL CITRATE 50 UG/ML
50 INJECTION, SOLUTION INTRAMUSCULAR; INTRAVENOUS EVERY 10 MIN PRN
Status: DISCONTINUED | OUTPATIENT
Start: 2017-10-26 | End: 2017-10-26 | Stop reason: HOSPADM

## 2017-10-26 RX ADMIN — PROPOFOL 50 MG: 10 INJECTION, EMULSION INTRAVENOUS at 07:55

## 2017-10-26 RX ADMIN — PROPOFOL 40 MG: 10 INJECTION, EMULSION INTRAVENOUS at 07:40

## 2017-10-26 RX ADMIN — PROPOFOL 30 MG: 10 INJECTION, EMULSION INTRAVENOUS at 07:47

## 2017-10-26 RX ADMIN — PROPOFOL 30 MG: 10 INJECTION, EMULSION INTRAVENOUS at 07:58

## 2017-10-26 RX ADMIN — SODIUM CHLORIDE, POTASSIUM CHLORIDE, SODIUM LACTATE AND CALCIUM CHLORIDE: 600; 310; 30; 20 INJECTION, SOLUTION INTRAVENOUS at 08:42

## 2017-10-26 RX ADMIN — SODIUM CHLORIDE, POTASSIUM CHLORIDE, SODIUM LACTATE AND CALCIUM CHLORIDE: 600; 310; 30; 20 INJECTION, SOLUTION INTRAVENOUS at 07:04

## 2017-10-26 RX ADMIN — PROPOFOL 30 MG: 10 INJECTION, EMULSION INTRAVENOUS at 08:01

## 2017-10-26 RX ADMIN — PROPOFOL 40 MG: 10 INJECTION, EMULSION INTRAVENOUS at 07:44

## 2017-10-26 RX ADMIN — LIDOCAINE HYDROCHLORIDE 80 MG: 20 INJECTION, SOLUTION INFILTRATION; PERINEURAL at 07:40

## 2017-10-26 RX ADMIN — PROPOFOL 30 MG: 10 INJECTION, EMULSION INTRAVENOUS at 08:04

## 2017-10-26 RX ADMIN — PROPOFOL 30 MG: 10 INJECTION, EMULSION INTRAVENOUS at 08:07

## 2017-10-26 RX ADMIN — PROPOFOL 50 MG: 10 INJECTION, EMULSION INTRAVENOUS at 07:37

## 2017-10-26 RX ADMIN — PROPOFOL 30 MG: 10 INJECTION, EMULSION INTRAVENOUS at 08:09

## 2017-10-26 RX ADMIN — PROPOFOL 20 MG: 10 INJECTION, EMULSION INTRAVENOUS at 07:46

## 2017-10-26 RX ADMIN — PROPOFOL 20 MG: 10 INJECTION, EMULSION INTRAVENOUS at 07:49

## 2017-10-26 ASSESSMENT — PAIN - FUNCTIONAL ASSESSMENT: PAIN_FUNCTIONAL_ASSESSMENT: 0-10

## 2017-10-26 NOTE — ANESTHESIA PRE PROCEDURE
Department of Anesthesiology  Preprocedure Note       Name:  Helder Morton   Age:  39 y.o.  :  1975                                          MRN:  [id number]         Date:  10/26/2017      Surgeon: Manda Bishop):  Chase Quinn MD    Procedure: Procedure(s):  EGD ESOPHAGOGASTRODUODENOSCOPY  EUS    Medications prior to admission:   Prior to Admission medications    Medication Sig Start Date End Date Taking?  Authorizing Provider   ondansetron (ZOFRAN ODT) 4 MG disintegrating tablet Take 1 tablet by mouth every 8 hours as needed for Nausea or Vomiting 17  Yes Christina Zepeda, DO   diphenhydrAMINE (BENADRYL) 25 MG tablet Take 25 mg by mouth every 6 hours as needed for Itching   Yes Historical Provider, MD   Linaclotide Melisa Roller) 72 MCG CAPS Take 1 capsule by mouth daily as needed (constipation) 17   Christina Zepeda, DO   pregabalin (LYRICA) 75 MG capsule Take 1 capsule by mouth 2 times daily 17   Christina Zepeda, DO   citalopram (CELEXA) 40 MG tablet Take 1 tablet by mouth daily 17   Christina Zepeda, DO   pantoprazole (PROTONIX) 40 MG tablet Take 1 tablet by mouth daily 17  Christina Zepeda, DO       Current medications:    Current Facility-Administered Medications   Medication Dose Route Frequency Provider Last Rate Last Dose    lactated ringers infusion   Intravenous Continuous Nikia Street MD        lidocaine PF 1 % injection 1 mL  1 mL Intradermal Once Nikia Street MD        lactated ringers infusion   Intravenous Continuous Nikia Street MD        sodium chloride flush 0.9 % injection 10 mL  10 mL Intravenous 2 times per day Nikia Street MD        sodium chloride flush 0.9 % injection 10 mL  10 mL Intravenous PRN Nikia Street MD        lidocaine PF 1 % injection 1 mL  1 mL Intradermal Once PRN Nikia Street MD        fentaNYL (SUBLIMAZE) injection 50 mcg  50 mcg Intravenous Q10 Min PRN MD Garret Baltazar HYDROmorphone (DILAUDID) injection 0.5 mg  0.5 mg Intravenous Q10 Min PRN Catalina Smalls MD        HYDROcodone-acetaminophen [facility]) 5-325 MG per tablet 1 tablet  1 tablet Oral PRN Catalina Smalls MD        Or    HYDROcodone-acetaminophen [facility]) 5-325 MG per tablet 2 tablet  2 tablet Oral PRN Catalina Smalls MD        diphenhydrAMINE (BENADRYL) injection 12.5 mg  12.5 mg Intravenous Once PRN Catalina Smalls MD        ondansetron [facility]) injection 4 mg  4 mg Intravenous Once PRN Catalina Smalls MD        metoclopramide [facility]) injection 10 mg  10 mg Intravenous Once PRN Catalina mSalls MD        meperidine (DEMEROL) injection 12.5 mg  12.5 mg Intravenous Q5 Min PRN Catalina Smalls MD        lactated ringers infusion   Intravenous Continuous Catalina Smalls MD        sodium chloride flush 0.9 % injection 10 mL  10 mL Intravenous 2 times per day Catalina Smalls MD        sodium chloride flush 0.9 % injection 10 mL  10 mL Intravenous PRN Catalina Smalls MD        lidocaine PF 1 % injection 1 mL  1 mL Intradermal Once PRN Catalina Smalls MD           Allergies:     Allergies   Allergen Reactions    Asa [Aspirin]     Demerol Hcl [Meperidine]     Morphine     Nsaids     Other      All oral narcotics    Xanax [Alprazolam]     Zoloft [Sertraline Hcl]        Problem List:    Patient Active Problem List   Diagnosis Code    Pancreatic mass, 7mm K86.9    Fatty liver K76.0    Tobacco abuse Z72.0    Internal hemorrhoids K64.8    Erosive gastritis K29.60    Irritable bowel syndrome K58.9    Nausea R11.0       Past Medical History:        Diagnosis Date    Anxiety     Attempted suicide     Depression     Diverticulosis     Erosive gastritis 6/1/2017    Fatty liver     Fatty pancreas     Fibromyalgia     Gastritis     Internal hemorrhoids 6/1/2017    Irritable bowel syndrome     Osteoarthritis     Pancreatic lesion  Pancreatic mass, 7mm 6/1/2017    Past heart attack     Tobacco abuse 6/1/2017       Past Surgical History:        Procedure Laterality Date    BREAST LUMPECTOMY Bilateral     CHOLECYSTECTOMY      COLONOSCOPY      HYSTERECTOMY      TUBAL LIGATION      UPPER GASTROINTESTINAL ENDOSCOPY      WISDOM TOOTH EXTRACTION         Social History:    Social History   Substance Use Topics    Smoking status: Current Every Day Smoker     Packs/day: 0.50    Smokeless tobacco: Never Used    Alcohol use No                                Ready to quit: Not Answered  Counseling given: Not Answered      Vital Signs (Current):   Vitals:    10/26/17 0647   BP: (!) 91/50   Pulse: 73   Resp: 18   Temp: 98 °F (36.7 °C)   TempSrc: Temporal   SpO2: 97%   Weight: 112 lb (50.8 kg)   Height: 5' (1.524 m)                                              BP Readings from Last 3 Encounters:   10/26/17 (!) 91/50   10/03/17 (!) 90/54   09/28/17 90/60       NPO Status: Time of last liquid consumption: 2330                        Time of last solid consumption: 1830                        Date of last liquid consumption: 10/25/17                        Date of last solid food consumption: 10/25/17    BMI:   Wt Readings from Last 3 Encounters:   10/26/17 112 lb (50.8 kg)   10/03/17 114 lb (51.7 kg)   09/28/17 113 lb (51.3 kg)     Body mass index is 21.87 kg/m².     CBC:   Lab Results   Component Value Date    WBC 9.0 09/28/2017    RBC 4.74 09/28/2017    HGB 14.1 09/28/2017    HCT 43.1 09/28/2017    MCV 90.8 09/28/2017    RDW 14.2 09/28/2017     09/28/2017       CMP:   Lab Results   Component Value Date     10/03/2017    K 4.6 10/03/2017     10/03/2017    CO2 23 10/03/2017    BUN 13 10/03/2017    CREATININE 0.58 10/03/2017    GFRAA >60.0 10/03/2017    LABGLOM >60.0 10/03/2017    GLUCOSE 97 10/03/2017    PROT 6.7 10/03/2017    CALCIUM 9.1 10/03/2017    BILITOT 0.3 10/03/2017    ALKPHOS 46 10/03/2017    AST 19 10/03/2017    ALT 12 10/03/2017       POC Tests: No results for input(s): POCGLU, POCNA, POCK, POCCL, POCBUN, POCHEMO, POCHCT in the last 72 hours. Coags: No results found for: PROTIME, INR, APTT    HCG (If Applicable): No results found for: PREGTESTUR, PREGSERUM, HCG, HCGQUANT     ABGs: No results found for: PHART, PO2ART, ASE8GAQ, MRX8IPU, BEART, T0RFVPFO     Type & Screen (If Applicable):  No results found for: LABABO, [address]    Anesthesia Evaluation  Patient summary reviewed and Nursing notes reviewed no history of anesthetic complications:   Airway: Mallampati: II  TM distance: >3 FB   Neck ROM: full  Mouth opening: > = 3 FB Dental: normal exam         Pulmonary:negative ROS and normal exam                               Cardiovascular:negative ROS                      Neuro/Psych:   {neg ROS              GI/Hepatic/Renal:   (+) PUD, liver disease:,           Endo/Other: negative ROS                    Abdominal:           Vascular:                                      Anesthesia Plan      MAC     ASA 2             Anesthetic plan and risks discussed with patient. Plan discussed with CRNA.     Attending anesthesiologist reviewed and agrees with Pre Eval content              Luis Lara MD   10/26/2017

## 2017-10-26 NOTE — OP NOTE
ENDOSCOPIC ULTRASOUND (EUS) REPORT    Patient: Erich Phoenix   : 1975    Procedure: Endoscopic ultrasonography examination    Date: 10/26/2017    Surgeon:  Cristela Garcia MD    Preoperative Diagnosis:  Lower abdominal pain, history of off both diarrhea and constipation in the past, findings of his 7 mm lesion on the pancreas at EUS in the past records of this procedure are not available for our review. Postoperative Diagnosis:  Normal-appearing pancreas    Anesthesia: MAC    Indications: 39y.o. year old female presents for EUS today with history of a 7 mm lesion on the pancreas. CONSENT:  Informed consent was obtained. The anesthesia and procedure along with the risks, benefits, and alternatives were discussed by myself and the nursing staff to the satisfaction of the patient/family with ample opportunity to ask and answer questions. We discussed the risks associated with this procedure including (but not limited to) bleeding, perforation, anesthesia reaction, post-procedural pancreatitis, cholangitis, or even death. PREMEDICATIONS AND OTHER MEDICATIONS:  Provided by anesthesia service. DETAILS OF PROCEDURE:  The patient was brought to the OR unit and provided anesthesia. The patient was placed in the left lateral prone position. A Olympus gastroscope, Olympus radial echoendoscope inserted into the oropharynx and advanced under direct vision into and through the esophagus, stomach, and duodenum. An endosonoscope with 7. 5MHz processor and color doppler were used throughout the procedure. I performed real time sonographic image interpretation without the assistance of a radiologist.     FINDINGS:  EGD:  DUODENUM: bulb and descending portion appeared normal.  STOMACH: pyloric channel, antrum, body, fundus and cardia, including retroflexed views appear normal.   ESOPHAGUS: Diaphragmatic hiatus was 40 cm from incisors and GE junction (upper margin of gastric folds) was at 40 cm from incisors. Squamocolumnar junction was at 40 cm from incisors. Mucosa appeared normal.    EUS:  Pancreas: Parenchyma appeared normal , no mass or cysts was noted. PD appeared normal, it measures 1 mm in the head of the pancreas and less than 1 mm in the body of pancreas. CBD/Gallbladder: No stones or sludge were seen in the CBD. CBD measures 2 mm in maximal diameter. GB not visualized  Left adrenal gland: Left adrenal gland appeared normal in size and shape  Liver: Examined portion of the liver appeares normal.  Lymph nodes: No abnormal lymphadenopathy was noted. Patient tolerated the procedure well and was taken to the PACU in good condition. EBL: None  Complication: None  Specimen Sent: None    Impression:  Normal upper endoscopy and EUS examination    Plan:  Continue current medication  Follow-up in GI clinic with CHAZ Dixon as already scheduled.     Kiara Nava MD 10/26/2017

## 2017-10-26 NOTE — H&P
Anika Jordan is a 39 y.o. female who presents 9/28/2017 with:          Chief Complaint   Patient presents with    Abdominal Pain       linzess does not help     Constipation    Nausea       sweats and shakes when nausea comes on     Anorexia       weight loss, lost 12 pounds in 2 months.         HPI Comments: Patient presents for follow-up regarding chronic nausea and constipation with associated weight loss. Reports history of previous EUS with diagnosis of 7mm pancreatic lesion but states there was no biopsy or cytology testing performed, we do not have records of this evaluation. 7/2016 patient underwent colonoscopy due to recurrent diarrhea with melena which showed mild sigmoid diverticulosis and small internal hemorrhoids. EGD at that time showed non-erosive gastropathy and small hiatal hernia. She is very worried as she has also had >20lb weight loss in the last 6 months. She becomes nauseated every time she eats, not c/o dysphagia, melena, hematochezia, hematemesis.     Abdominal Pain   This is a chronic problem. The current episode started more than 1 year ago. The onset quality is gradual. The problem occurs 2 to 4 times per day. The problem has been unchanged. The pain is at a severity of 4/10. The quality of the pain is colicky and cramping. Associated symptoms include anorexia, constipation, nausea and weight loss. Pertinent negatives include no diarrhea, fever, headaches, hematochezia, melena or vomiting. The pain is aggravated by eating. The pain is relieved by nothing. 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 Her past medical history is significant for GERD and irritable bowel syndrome. Constipation   Associated symptoms include abdominal pain, anorexia, nausea and weight loss. Pertinent negatives include no diarrhea, fever, hematochezia, melena, rectal pain or vomiting.  Her past medical history is significant for irritable bowel syndrome.             Patient Active Problem List   Diagnosis    Pancreatic mass, 7mm    Fatty liver    Tobacco abuse    Internal hemorrhoids    Erosive gastritis    Irritable bowel syndrome    Nausea      Past Medical History        Past Medical History:   Diagnosis Date    Anxiety      Attempted suicide ([facility] [address]      Depression      Diverticulosis      Erosive gastritis 6/1/2017    Fatty liver      Fatty pancreas      Fibromyalgia      Gastritis      Internal hemorrhoids 6/1/2017    Irritable bowel syndrome      Osteoarthritis      Pancreatic lesion      Pancreatic mass, 7mm 6/1/2017    Past heart attack      Tobacco abuse 6/1/2017         Past Surgical History         Past Surgical History:   Procedure Laterality Date    BREAST LUMPECTOMY Bilateral      CHOLECYSTECTOMY        COLONOSCOPY        HYSTERECTOMY        TUBAL LIGATION        UPPER GASTROINTESTINAL ENDOSCOPY        WISDOM TOOTH EXTRACTION             Social History   Social History            Social History    Marital status: Single       Spouse name: N/A    Number of children: N/A    Years of education: N/A          Occupational History    Not on file.            Social History Main Topics    Smoking status: Current Every Day Smoker       Packs/day: 0.50    Smokeless tobacco: Never Used    Alcohol use No    Drug use: Yes       Special: Marijuana    Sexual activity: Not on file           Other Topics Concern    Not on file      Social History Narrative         Family History          Family History   Problem Relation Age of Onset    Cancer Mother         ovarian    Asthma Mother      Other Mother         copd,emphysema    Cancer Maternal Grandmother         ovarian    Asthma Maternal Grandmother      Other Maternal Grandmother         copd,emphysema         Allergies:  Terrance Caridad [aspirin]; Demerol hcl [meperidine]; Morphine; Nsaids;  Other; Xanax [alprazolam]; and Zoloft [sertraline hcl]  Current Facility-Administered Medications          Current Outpatient Prescriptions   Medication Sig Dispense Refill    Linaclotide (LINZESS) 72 MCG CAPS Take 1 capsule by mouth daily as needed (constipation) 30 capsule 3    ondansetron (ZOFRAN ODT) 4 MG disintegrating tablet Take 1 tablet by mouth every 8 hours as needed for Nausea or Vomiting 30 tablet 3    diphenhydrAMINE (BENADRYL) 25 MG tablet Take 25 mg by mouth every 6 hours as needed for Itching        pregabalin (LYRICA) 75 MG capsule Take 1 capsule by mouth 2 times daily 60 capsule 3    citalopram (CELEXA) 40 MG tablet Take 1 tablet by mouth daily 30 tablet 5    pantoprazole (PROTONIX) 40 MG tablet Take 1 tablet by mouth daily 30 tablet 11      No current facility-administered medications for this visit.                Review of Systems   Constitutional: Positive for weight loss. Negative for chills, diaphoresis, fatigue, fever and unexpected weight change. Respiratory: Negative for apnea, cough, chest tightness, shortness of breath and wheezing. Cardiovascular: Negative for chest pain, palpitations and leg swelling. Gastrointestinal: Positive for abdominal pain, anorexia, constipation and nausea. Negative for abdominal distention, anal bleeding, blood in stool, diarrhea, hematochezia, melena, rectal pain and vomiting. Skin: Negative for color change, pallor, rash and wound. Neurological: Negative for dizziness, tremors, seizures, syncope, speech difficulty, weakness, light-headedness and headaches. Psychiatric/Behavioral: Negative for agitation and confusion. The patient is nervous/anxious. All other systems reviewed and are negative.        Objective:      Vitals:  BP 90/60  Pulse 59  Resp 12  Ht 5' (1.524 m)  Wt 113 lb (51.3 kg)  SpO2 99%  BMI 22.07 kg/m2  Body mass index is 22.07 kg/(m^2).      Physical Exam   Constitutional: She is oriented to person, place, and time. She appears well-developed and well-nourished. No distress. HENT:   Head: Normocephalic and atraumatic.    Neck: Normal range of motion. Neck supple. No JVD present. No tracheal deviation present. No thyromegaly present. Cardiovascular: Normal rate, regular rhythm and normal heart sounds. Pulmonary/Chest: Effort normal and breath sounds normal. No respiratory distress. She has no wheezes. She has no rales. Abdominal: Soft. Bowel sounds are normal. She exhibits no distension and no mass. There is no tenderness. There is no guarding. Musculoskeletal: Normal range of motion. She exhibits no edema or tenderness. Neurological: She is alert and oriented to person, place, and time. Skin: Skin is warm and dry. No rash noted. No erythema. No pallor. Psychiatric: She has a normal mood and affect. Her behavior is normal. Thought content normal.   Nursing note and vitals reviewed.        Assessment:      1. Pancreatic mass  CBC     Comprehensive Metabolic Panel     Lipase     Amylase   2. Irritable bowel syndrome with constipation      3.  Nausea            Plan:      -EGD with possible EUS with Dr. Hilton Centers in near future, patient counseled on risks/benefits as well as procedural details and does wish to proceed           Orders Placed This Encounter   Procedures    CBC       Standing Status:   Future       Number of Occurrences:   1       Standing Expiration Date:   9/28/2018    Comprehensive Metabolic Panel    Lipase       Standing Status:   Future       Number of Occurrences:   1       Standing Expiration Date:   9/28/2018    Amylase       Standing Status:   Future       Number of Occurrences:   1       Standing Expiration Date:   9/28/2018         Follow up:  No Follow-up on file.     CHAZ Gomez

## 2017-10-26 NOTE — PROGRESS NOTES
Reviewed disch instr with patient and partner. Both verbalize understanding. Very anxious to go home. Taking sips of H20 & svetlana well. Up & about in room with steady, stable gait. Abd soft all over. No rectal bleeding or disch. Condition stable.

## 2017-10-28 ENCOUNTER — OFFICE VISIT (OUTPATIENT)
Dept: PHYSICAL MEDICINE AND REHAB | Age: 42
End: 2017-10-28

## 2017-10-28 DIAGNOSIS — G89.29 CHRONIC BILATERAL LOW BACK PAIN WITH BILATERAL SCIATICA: Primary | ICD-10-CM

## 2017-10-28 DIAGNOSIS — M54.16 LUMBAR RADICULOPATHY: ICD-10-CM

## 2017-10-28 DIAGNOSIS — M54.42 CHRONIC BILATERAL LOW BACK PAIN WITH BILATERAL SCIATICA: Primary | ICD-10-CM

## 2017-10-28 DIAGNOSIS — M54.17 LUMBOSACRAL RADICULOPATHY AT L5: ICD-10-CM

## 2017-10-28 DIAGNOSIS — M54.41 CHRONIC BILATERAL LOW BACK PAIN WITH BILATERAL SCIATICA: Primary | ICD-10-CM

## 2017-10-28 DIAGNOSIS — M54.31 RIGHT SIDED SCIATICA: ICD-10-CM

## 2017-10-28 DIAGNOSIS — M70.61 GREATER TROCHANTERIC BURSITIS OF RIGHT HIP: ICD-10-CM

## 2017-10-28 PROCEDURE — 95886 MUSC TEST DONE W/N TEST COMP: CPT | Performed by: PHYSICAL MEDICINE & REHABILITATION

## 2017-10-28 PROCEDURE — 95912 NRV CNDJ TEST 11-12 STUDIES: CPT | Performed by: PHYSICAL MEDICINE & REHABILITATION

## 2017-11-09 ENCOUNTER — OFFICE VISIT (OUTPATIENT)
Dept: PRIMARY CARE CLINIC | Age: 42
End: 2017-11-09

## 2017-11-09 VITALS
BODY MASS INDEX: 22.97 KG/M2 | TEMPERATURE: 98.6 F | HEART RATE: 64 BPM | DIASTOLIC BLOOD PRESSURE: 58 MMHG | RESPIRATION RATE: 12 BRPM | SYSTOLIC BLOOD PRESSURE: 90 MMHG | WEIGHT: 117 LBS | HEIGHT: 60 IN

## 2017-11-09 DIAGNOSIS — M54.31 RIGHT SIDED SCIATICA: ICD-10-CM

## 2017-11-09 DIAGNOSIS — M70.61 GREATER TROCHANTERIC BURSITIS OF RIGHT HIP: ICD-10-CM

## 2017-11-09 DIAGNOSIS — J45.20 MILD INTERMITTENT ASTHMA WITHOUT COMPLICATION: ICD-10-CM

## 2017-11-09 DIAGNOSIS — M62.830 SPASM OF THORACIC BACK MUSCLE: Primary | ICD-10-CM

## 2017-11-09 DIAGNOSIS — Z72.0 TOBACCO ABUSE: ICD-10-CM

## 2017-11-09 PROCEDURE — G8484 FLU IMMUNIZE NO ADMIN: HCPCS | Performed by: FAMILY MEDICINE

## 2017-11-09 PROCEDURE — 99214 OFFICE O/P EST MOD 30 MIN: CPT | Performed by: FAMILY MEDICINE

## 2017-11-09 PROCEDURE — G8420 CALC BMI NORM PARAMETERS: HCPCS | Performed by: FAMILY MEDICINE

## 2017-11-09 PROCEDURE — G8427 DOCREV CUR MEDS BY ELIG CLIN: HCPCS | Performed by: FAMILY MEDICINE

## 2017-11-09 PROCEDURE — 4004F PT TOBACCO SCREEN RCVD TLK: CPT | Performed by: FAMILY MEDICINE

## 2017-11-09 RX ORDER — PREGABALIN 75 MG/1
75 CAPSULE ORAL 3 TIMES DAILY
Qty: 90 CAPSULE | Refills: 3 | Status: SHIPPED | OUTPATIENT
Start: 2017-11-09 | End: 2018-06-13 | Stop reason: SINTOL

## 2017-11-09 RX ORDER — METHOCARBAMOL 500 MG/1
500 TABLET, FILM COATED ORAL EVERY EVENING
Qty: 30 TABLET | Refills: 2 | Status: SHIPPED | OUTPATIENT
Start: 2017-11-09 | End: 2017-11-19

## 2017-11-09 ASSESSMENT — ENCOUNTER SYMPTOMS
CONSTIPATION: 0
COUGH: 1
BACK PAIN: 1
WHEEZING: 1
EYE REDNESS: 0
PHOTOPHOBIA: 0
RHINORRHEA: 1
SHORTNESS OF BREATH: 1
HEMOPTYSIS: 0
SORE THROAT: 0
EYES NEGATIVE: 1
ABDOMINAL PAIN: 0
DIARRHEA: 0
HEARTBURN: 1
EYE ITCHING: 0

## 2017-11-09 NOTE — PROGRESS NOTES
Subjective:      Patient ID: Moises Reece is a 39 y.o. female who presents today for:  Chief Complaint   Patient presents with    Cough     F/U on recurring cough that she has had since August (almost 4 months). The cough is productive of dark yellow sputum & sometimes has brown spots in it. She admits to: right ear pain, headaches, heartburn, nasal congestion, postnasal drip, rhinorrhea, sweats & wheezing. She was given Ceftin on 9/18/17, states that she hasn't taken any other prescribed medications or OTC medications since.  Back Pain     F/U on recurring back pain that she has had since August. The pain is constant. States that the pain has worsened. The pain is on her right side thoracic area. Describes the pain as a constant ache, if she coughs or takes a deep breath she has a stabbing feeling. She rates the pain as: 8/10. She admits to: left leg weakness. States that she has tried many OTC medications & remedies with no help.  Results     Would like to go over the EMG from 10/28/17. Cough   This is a recurrent problem. The current episode started more than 1 month ago. The problem has been unchanged. The cough is productive of sputum (dark yellow sputum). Associated symptoms include ear pain (right), headaches, heartburn, nasal congestion, postnasal drip, rhinorrhea, shortness of breath, sweats and wheezing. Pertinent negatives include no chest pain, chills, ear congestion, eye redness, fever, hemoptysis, myalgias, rash, sore throat or weight loss. Treatments tried: Ceftin, OTC medications. The treatment provided no relief. Back Pain   This is a recurrent problem. The current episode started more than 1 month ago. The problem occurs constantly. The problem is unchanged. The pain is present in the lumbar spine. The quality of the pain is described as aching and stabbing. The pain radiates to the right thigh. The pain is at a severity of 8/10. The pain is severe.  The symptoms are aggravated by position and bending. Associated symptoms include headaches and weakness (right leg). Pertinent negatives include no abdominal pain, chest pain, fever, pelvic pain, tingling or weight loss. Treatments tried: Lyrica, OTC medications. The treatment provided no relief. Past Medical History:   Diagnosis Date    Anxiety     Attempted suicide     Depression     Diverticulosis     Erosive gastritis 6/1/2017    Fatty liver     Fatty pancreas     Fibromyalgia     Gastritis     Internal hemorrhoids 6/1/2017    Irritable bowel syndrome     Osteoarthritis     Pancreatic lesion     Pancreatic mass, 7mm 6/1/2017    Past heart attack     Tobacco abuse 6/1/2017     Past Surgical History:   Procedure Laterality Date    BREAST LUMPECTOMY Bilateral     CHOLECYSTECTOMY      COLONOSCOPY      ENDOSCOPIC ULTRASOUND (LOWER) N/A 10/26/2017    EUS performed by Lela Sadler MD at [address] ESOPHAGOGASTRODUODENOSCOPY TRANSORAL DIAGNOSTIC N/A 10/26/2017    EGD ESOPHAGOGASTRODUODENOSCOPY [facility] at [facility]      UPPER GASTROINTESTINAL ENDOSCOPY      WISDOM TOOTH EXTRACTION       Family History   Problem Relation Age of Onset    Cancer Mother      ovarian    Asthma Mother     Other Mother      copd,emphysema    Cancer Maternal Grandmother      ovarian    Asthma Maternal Grandmother     Other Maternal Grandmother      copd,emphysema     Social History     Social History    Marital status: Single     Spouse name: N/A    Number of children: N/A    Years of education: N/A     Occupational History    Not on file.      Social History Main Topics    Smoking status: Current Every Day Smoker     Packs/day: 0.50     Years: 30.00     Types: Cigarettes    Smokeless tobacco: Never Used    Alcohol use No    Drug use:      Types: Marijuana    Sexual activity: Not on file     Other Topics Concern    Not on file     Social History Narrative    No narrative

## 2017-11-11 ENCOUNTER — APPOINTMENT (OUTPATIENT)
Dept: GENERAL RADIOLOGY | Age: 42
End: 2017-11-11
Payer: COMMERCIAL

## 2017-11-11 ENCOUNTER — HOSPITAL ENCOUNTER (EMERGENCY)
Age: 42
Discharge: HOME | End: 2017-11-11
Attending: EMERGENCY MEDICINE
Payer: COMMERCIAL

## 2017-11-11 VITALS
SYSTOLIC BLOOD PRESSURE: 102 MMHG | RESPIRATION RATE: 16 BRPM | WEIGHT: 112 LBS | HEART RATE: 70 BPM | TEMPERATURE: 98.3 F | OXYGEN SATURATION: 97 % | DIASTOLIC BLOOD PRESSURE: 69 MMHG | BODY MASS INDEX: 21.99 KG/M2 | HEIGHT: 60 IN

## 2017-11-11 DIAGNOSIS — M62.838 SPASM OF MUSCLE: Primary | ICD-10-CM

## 2017-11-11 DIAGNOSIS — S39.012A BACK STRAIN, INITIAL ENCOUNTER: ICD-10-CM

## 2017-11-11 DIAGNOSIS — S29.019A THORACIC MYOFASCIAL STRAIN, INITIAL ENCOUNTER: ICD-10-CM

## 2017-11-11 DIAGNOSIS — M54.6 PAIN IN THORACIC SPINE: ICD-10-CM

## 2017-11-11 LAB
ALBUMIN SERPL-MCNC: 4.5 G/DL (ref 3.9–4.9)
ALP BLD-CCNC: 52 U/L (ref 40–130)
ALT SERPL-CCNC: 13 U/L (ref 0–33)
ANION GAP SERPL CALCULATED.3IONS-SCNC: 11 MEQ/L (ref 7–13)
AST SERPL-CCNC: 21 U/L (ref 0–35)
BASOPHILS ABSOLUTE: 0.1 K/UL (ref 0–0.2)
BASOPHILS RELATIVE PERCENT: 0.5 %
BILIRUB SERPL-MCNC: 0.2 MG/DL (ref 0–1.2)
BUN BLDV-MCNC: 15 MG/DL (ref 6–20)
CALCIUM SERPL-MCNC: 9.6 MG/DL (ref 8.6–10.2)
CHLORIDE BLD-SCNC: 102 MEQ/L (ref 98–107)
CO2: 29 MEQ/L (ref 22–29)
CREAT SERPL-MCNC: 0.6 MG/DL (ref 0.5–0.9)
D DIMER: 0.22 MG/L FEU (ref 0–0.5)
EOSINOPHILS ABSOLUTE: 0.1 K/UL (ref 0–0.7)
EOSINOPHILS RELATIVE PERCENT: 0.8 %
GFR AFRICAN AMERICAN: >60
GFR NON-AFRICAN AMERICAN: >60
GLOBULIN: 2.6 G/DL (ref 2.3–3.5)
GLUCOSE BLD-MCNC: 73 MG/DL (ref 74–109)
HCT VFR BLD CALC: 40.5 % (ref 37–47)
HEMOGLOBIN: 13.7 G/DL (ref 12–16)
INR BLD: 1
LYMPHOCYTES ABSOLUTE: 3.2 K/UL (ref 1–4.8)
LYMPHOCYTES RELATIVE PERCENT: 29.5 %
MCH RBC QN AUTO: 30.9 PG (ref 27–31.3)
MCHC RBC AUTO-ENTMCNC: 33.9 % (ref 33–37)
MCV RBC AUTO: 91.3 FL (ref 82–100)
MONOCYTES ABSOLUTE: 0.8 K/UL (ref 0.2–0.8)
MONOCYTES RELATIVE PERCENT: 7.6 %
NEUTROPHILS ABSOLUTE: 6.6 K/UL (ref 1.4–6.5)
NEUTROPHILS RELATIVE PERCENT: 61.6 %
PDW BLD-RTO: 14.2 % (ref 11.5–14.5)
PLATELET # BLD: 206 K/UL (ref 130–400)
POTASSIUM SERPL-SCNC: 4 MEQ/L (ref 3.5–5.1)
PROTHROMBIN TIME: 10.1 SEC (ref 9.6–12.3)
RBC # BLD: 4.44 M/UL (ref 4.2–5.4)
SODIUM BLD-SCNC: 142 MEQ/L (ref 132–144)
TOTAL PROTEIN: 7.1 G/DL (ref 6.4–8.1)
WBC # BLD: 10.7 K/UL (ref 4.8–10.8)

## 2017-11-11 PROCEDURE — 80053 COMPREHEN METABOLIC PANEL: CPT

## 2017-11-11 PROCEDURE — 85025 COMPLETE CBC W/AUTO DIFF WBC: CPT

## 2017-11-11 PROCEDURE — 36415 COLL VENOUS BLD VENIPUNCTURE: CPT

## 2017-11-11 PROCEDURE — 72072 X-RAY EXAM THORAC SPINE 3VWS: CPT

## 2017-11-11 PROCEDURE — 96375 TX/PRO/DX INJ NEW DRUG ADDON: CPT

## 2017-11-11 PROCEDURE — 99284 EMERGENCY DEPT VISIT MOD MDM: CPT

## 2017-11-11 PROCEDURE — 96374 THER/PROPH/DIAG INJ IV PUSH: CPT

## 2017-11-11 PROCEDURE — 85379 FIBRIN DEGRADATION QUANT: CPT

## 2017-11-11 PROCEDURE — 85610 PROTHROMBIN TIME: CPT

## 2017-11-11 PROCEDURE — 71020 XR CHEST STANDARD TWO VW: CPT

## 2017-11-11 PROCEDURE — 6360000002 HC RX W HCPCS: Performed by: EMERGENCY MEDICINE

## 2017-11-11 RX ORDER — DIAZEPAM 5 MG/ML
5 INJECTION, SOLUTION INTRAMUSCULAR; INTRAVENOUS ONCE
Status: COMPLETED | OUTPATIENT
Start: 2017-11-11 | End: 2017-11-11

## 2017-11-11 RX ORDER — KETOROLAC TROMETHAMINE 30 MG/ML
30 INJECTION, SOLUTION INTRAMUSCULAR; INTRAVENOUS ONCE
Status: COMPLETED | OUTPATIENT
Start: 2017-11-11 | End: 2017-11-11

## 2017-11-11 RX ORDER — SODIUM CHLORIDE 0.9 % (FLUSH) 0.9 %
3 SYRINGE (ML) INJECTION EVERY 8 HOURS
Status: DISCONTINUED | OUTPATIENT
Start: 2017-11-11 | End: 2017-11-11 | Stop reason: HOSPADM

## 2017-11-11 RX ORDER — TRAMADOL HYDROCHLORIDE 50 MG/1
50 TABLET ORAL EVERY 6 HOURS PRN
Qty: 20 TABLET | Refills: 0 | Status: SHIPPED | OUTPATIENT
Start: 2017-11-11 | End: 2017-11-21

## 2017-11-11 RX ORDER — DIPHENHYDRAMINE HYDROCHLORIDE 50 MG/ML
25 INJECTION INTRAMUSCULAR; INTRAVENOUS ONCE
Status: COMPLETED | OUTPATIENT
Start: 2017-11-11 | End: 2017-11-11

## 2017-11-11 RX ADMIN — DIAZEPAM 5 MG: 5 INJECTION, SOLUTION INTRAMUSCULAR; INTRAVENOUS at 18:50

## 2017-11-11 RX ADMIN — KETOROLAC TROMETHAMINE 30 MG: 30 INJECTION, SOLUTION INTRAMUSCULAR at 18:51

## 2017-11-11 RX ADMIN — DIPHENHYDRAMINE HYDROCHLORIDE 25 MG: 50 INJECTION INTRAMUSCULAR; INTRAVENOUS at 18:51

## 2017-11-11 ASSESSMENT — ENCOUNTER SYMPTOMS
STRIDOR: 0
SHORTNESS OF BREATH: 0
CONSTIPATION: 0
EYE PAIN: 0
CHOKING: 0
VOMITING: 0
VOICE CHANGE: 0
EYE REDNESS: 0
CHEST TIGHTNESS: 0
WHEEZING: 0
SORE THROAT: 0
ABDOMINAL PAIN: 0
BLOOD IN STOOL: 0
COUGH: 0
BACK PAIN: 1
EYE DISCHARGE: 0
FACIAL SWELLING: 0
TROUBLE SWALLOWING: 0
SINUS PRESSURE: 0
DIARRHEA: 0

## 2017-11-11 ASSESSMENT — PAIN DESCRIPTION - PROGRESSION: CLINICAL_PROGRESSION: GRADUALLY IMPROVING

## 2017-11-11 ASSESSMENT — PAIN DESCRIPTION - ORIENTATION
ORIENTATION: RIGHT;MID
ORIENTATION: MID

## 2017-11-11 ASSESSMENT — PAIN DESCRIPTION - DESCRIPTORS
DESCRIPTORS: ACHING
DESCRIPTORS: ACHING

## 2017-11-11 ASSESSMENT — PAIN DESCRIPTION - PAIN TYPE
TYPE: ACUTE PAIN
TYPE: ACUTE PAIN

## 2017-11-11 ASSESSMENT — PAIN SCALES - GENERAL
PAINLEVEL_OUTOF10: 6
PAINLEVEL_OUTOF10: 9

## 2017-11-11 ASSESSMENT — PAIN DESCRIPTION - LOCATION
LOCATION: BACK
LOCATION: BACK

## 2017-11-12 NOTE — ED NOTES
Follow up phone call made, and pt information left / [facility] at this time.      Marilyn Medrano  11/11/17 [id number]

## 2017-11-12 NOTE — ED PROVIDER NOTES
tablet by mouth every evening for 10 days    MOMETASONE-FORMOTEROL (DULERA) 200-5 MCG/ACT INHALER    Inhale 2 puffs into the lungs 2 times daily    ONDANSETRON (ZOFRAN ODT) 4 MG DISINTEGRATING TABLET    Take 1 tablet by mouth every 8 hours as needed for Nausea or Vomiting    PANTOPRAZOLE (PROTONIX) 40 MG TABLET    Take 1 tablet by mouth daily    PREGABALIN (LYRICA) 75 MG CAPSULE    Take 1 capsule by mouth 3 times daily . ALLERGIES     Asa [aspirin]; Demerol hcl [meperidine]; Morphine; Nsaids; Other; Xanax [alprazolam]; and Zoloft [sertraline hcl]    FAMILY HISTORY       Family History   Problem Relation Age of Onset   Daly Templeine Cancer Mother      ovarian    Asthma Mother     Other Mother      copd,emphysema    Cancer Maternal Grandmother      ovarian    Asthma Maternal Grandmother     Other Maternal Grandmother      copd,emphysema          SOCIAL HISTORY       Social History     Social History    Marital status: Single     Spouse name: N/A    Number of children: N/A    Years of education: N/A     Social History Main Topics    Smoking status: Current Every Day Smoker     Packs/day: 0.50     Years: 30.00     Types: Cigarettes    Smokeless tobacco: Never Used    Alcohol use No    Drug use:      Types: Marijuana    Sexual activity: Not Asked     Other Topics Concern    None     Social History Narrative    None       SCREENINGS    [place] Coma Scale  Eye Opening: Spontaneous  Best Verbal Response: Oriented  Best Motor Response: Obeys commands  [place] Coma Scale Score: 15        PHYSICAL EXAM    (up to 7 for level 4, 8 or more for level 5)     ED Triage Vitals [11/11/17 1808]   BP Temp Temp Source Pulse Resp SpO2 Height Weight   119/63 98.3 °F (36.8 °C) Oral 87 18 99 % 5' (1.524 m) 112 lb (50.8 kg)       Physical Exam   Constitutional: She is oriented to person, place, and time. She appears well-developed.    Active alert with a belt cooperative nontoxic change position slowly spasm noted while patient changing position in the back   HENT:   Head: Normocephalic. Eyes: Right eye exhibits no discharge. Left eye exhibits no discharge. Neck: Normal range of motion. Neck supple. No JVD present. No tracheal deviation present. No thyromegaly present. Cardiovascular: Normal rate, regular rhythm and normal heart sounds. Exam reveals no gallop and no friction rub. No murmur heard. Pulmonary/Chest: Breath sounds normal. No respiratory distress. She has no wheezes. Abdominal: Soft. Bowel sounds are normal. She exhibits no mass. There is no rebound. Musculoskeletal: Normal range of motion. She exhibits tenderness. She exhibits no edema or deformity. Attention given to the back examination supine and sitting position patient has a parathyroid masses a spasm and tenderness patient has no point tenderness no hematoma no crepitus patient has no shingles and no rash no erythema to suspect any cellulitis   Lymphadenopathy:     She has no cervical adenopathy. Neurological: She is alert and oriented to person, place, and time. No cranial nerve deficit. She exhibits normal muscle tone. Cr nerves 2 through 12 grossly intact patient has no sensory deficit could by little]   Skin: Skin is warm. No rash noted. No erythema.    Psychiatric: Her behavior is normal. Thought content normal.       DIAGNOSTIC RESULTS     EKG: All EKG's are interpreted by the Emergency Department Physician who either signs or Co-signs this chart in the absence of a cardiologist.        RADIOLOGY:   Non-plain film images such as CT, Ultrasound and MRI are read by the radiologist. Plain radiographic images are visualized and preliminarily interpreted by the emergency physician with the below findings:        Interpretation per the Radiologist below, if available at the time of this note:    XR THORACIC SPINE (3 VIEWS)    (Results Pending)   XR CHEST STANDARD (2 VW)    (Results Pending)         ED BEDSIDE ULTRASOUND:   Performed by ED Physician -

## 2017-11-12 NOTE — ED NOTES
Explained discharge instructions and one prescriptios to patient. Went over discharge diagnosis and pertinent educational material with patient. Patient stated understanding of discharge diagnosis, instructions, and prescriptions. Patient denies any questions at this time. No signs or symptoms of pain or distress noted at this time. Patient discharged to home with daughter. Patient denies needs at time of discharge. Patient a/o x3, ambulatory, respirations even and unlabored on room air. Patient states she is starting to feel better and is going to eat as soon as she leaves er.       Sherrill Marquez RN  11/11/17 [id number]

## 2017-11-22 DIAGNOSIS — R53.83 FATIGUE, UNSPECIFIED TYPE: ICD-10-CM

## 2017-11-22 LAB
T4 FREE: 1.14 NG/DL (ref 0.93–1.7)
T4 TOTAL: 7.9 UG/DL (ref 4.5–11.7)
TSH SERPL DL<=0.05 MIU/L-ACNC: 1.65 UIU/ML (ref 0.27–4.2)

## 2017-11-28 ENCOUNTER — OFFICE VISIT (OUTPATIENT)
Dept: PRIMARY CARE CLINIC | Age: 42
End: 2017-11-28

## 2017-11-28 VITALS
HEIGHT: 60 IN | HEART RATE: 76 BPM | RESPIRATION RATE: 12 BRPM | TEMPERATURE: 98.5 F | DIASTOLIC BLOOD PRESSURE: 60 MMHG | SYSTOLIC BLOOD PRESSURE: 118 MMHG | WEIGHT: 118 LBS | BODY MASS INDEX: 23.16 KG/M2

## 2017-11-28 DIAGNOSIS — M79.7 FIBROMYALGIA: ICD-10-CM

## 2017-11-28 DIAGNOSIS — M70.61 GREATER TROCHANTERIC BURSITIS OF RIGHT HIP: ICD-10-CM

## 2017-11-28 DIAGNOSIS — M54.31 RIGHT SIDED SCIATICA: ICD-10-CM

## 2017-11-28 DIAGNOSIS — F41.9 ANXIETY: Primary | ICD-10-CM

## 2017-11-28 PROBLEM — K86.89 PANCREATIC MASS: Status: ACTIVE | Noted: 2017-11-28

## 2017-11-28 PROCEDURE — 4004F PT TOBACCO SCREEN RCVD TLK: CPT | Performed by: FAMILY MEDICINE

## 2017-11-28 PROCEDURE — G8420 CALC BMI NORM PARAMETERS: HCPCS | Performed by: FAMILY MEDICINE

## 2017-11-28 PROCEDURE — G8427 DOCREV CUR MEDS BY ELIG CLIN: HCPCS | Performed by: FAMILY MEDICINE

## 2017-11-28 PROCEDURE — 99214 OFFICE O/P EST MOD 30 MIN: CPT | Performed by: FAMILY MEDICINE

## 2017-11-28 PROCEDURE — G8484 FLU IMMUNIZE NO ADMIN: HCPCS | Performed by: FAMILY MEDICINE

## 2017-11-28 RX ORDER — METAXALONE 800 MG/1
800 TABLET ORAL 3 TIMES DAILY
Qty: 30 TABLET | Refills: 1 | Status: SHIPPED | OUTPATIENT
Start: 2017-11-28 | End: 2017-12-11 | Stop reason: SDUPTHER

## 2017-11-28 RX ORDER — BUSPIRONE HYDROCHLORIDE 7.5 MG/1
7.5 TABLET ORAL 2 TIMES DAILY
Qty: 30 TABLET | Refills: 0 | Status: SHIPPED | OUTPATIENT
Start: 2017-11-28 | End: 2017-12-11 | Stop reason: SDUPTHER

## 2017-11-28 RX ORDER — BUSPIRONE HYDROCHLORIDE 15 MG/1
15 TABLET ORAL 2 TIMES DAILY
Qty: 60 TABLET | Refills: 3 | Status: SHIPPED | OUTPATIENT
Start: 2017-11-28 | End: 2018-01-15

## 2017-11-28 ASSESSMENT — ENCOUNTER SYMPTOMS
GASTROINTESTINAL NEGATIVE: 1
ABDOMINAL PAIN: 0
BOWEL INCONTINENCE: 0
WHEEZING: 0
DIARRHEA: 0
BACK PAIN: 1
RESPIRATORY NEGATIVE: 1
EYE ITCHING: 0
CONSTIPATION: 0
SHORTNESS OF BREATH: 0
EYE REDNESS: 0
PHOTOPHOBIA: 0
EYES NEGATIVE: 1

## 2017-11-28 NOTE — PROGRESS NOTES
for tingling, weakness, numbness, headaches and paresthesias. Psychiatric/Behavioral: Negative for agitation and behavioral problems. The patient is nervous/anxious. Objective:   /60 (Site: Right Arm, Position: Sitting, Cuff Size: Large Adult)   Pulse 76   Temp 98.5 °F (36.9 °C) (Oral)   Resp 12   Ht 5' (1.524 m)   Wt 118 lb (53.5 kg)   BMI 23.05 kg/m²     Physical Exam   Constitutional: She is oriented to person, place, and time. She appears well-developed and well-nourished. HENT:   Head: Normocephalic and atraumatic. Nose: Nose normal.   Eyes: Conjunctivae and EOM are normal. Pupils are equal, round, and reactive to light. No scleral icterus. Neck: Normal range of motion. Neck supple. Cardiovascular: Normal rate, regular rhythm and normal heart sounds. Exam reveals no gallop and no friction rub. No murmur heard. Pulmonary/Chest: Effort normal and breath sounds normal. No respiratory distress. She has no wheezes. She has no rales. She exhibits no tenderness. Neurological: She is alert and oriented to person, place, and time. Skin: Skin is warm and dry. No rash noted. No erythema. No pallor. Psychiatric: She has a normal mood and affect. Her behavior is normal. Judgment normal.   Nursing note and vitals reviewed. Assessment:     1. Anxiety  busPIRone (BUSPAR) 7.5 MG tablet    busPIRone (BUSPAR) 15 MG tablet   2. Greater trochanteric bursitis of right hip  Amb External Referral To Pain Clinic   3. Right sided sciatica  Amb External Referral To Pain Clinic   4.  Pancreatic mass, 7mm, Dr Alexx Brantley:      Orders Placed This Encounter   Procedures    Amb External Referral To Pain Clinic     Referral Priority:   Routine     Referral Type:   Consult for Advice and Opinion     Referral Reason:   Specialty Services Required     Requested Specialty:   Pain Management     Number of Visits Requested:   1     Orders Placed This Encounter   Medications    busPIRone (BUSPAR) 7.5 MG tablet     Sig: Take 1 tablet by mouth 2 times daily X 1 week, then TID until gone, then increase to 15 mg     Dispense:  30 tablet     Refill:  0    busPIRone (BUSPAR) 15 MG tablet     Sig: Take 1 tablet by mouth 2 times daily     Dispense:  60 tablet     Refill:  3    metaxalone (SKELAXIN) 800 MG tablet     Sig: Take 1 tablet by mouth 3 times daily for 10 days     Dispense:  30 tablet     Refill:  1       Controlled Substances Monitoring:      No Follow-up on file. I, Romy Brown CMA   , am scribing for and in the presence of [employer], DO. Electronically signed by :  Romy Pittman DO, personally performed the services described in this documentation, as scribed by Gabbi Santiago CMA   in my presence, and it is both accurate and complete.  Electronically signed by: [place] [employer], DO    11/28/17 3:10 PM    [place] DO [place]

## 2017-12-11 DIAGNOSIS — F41.9 ANXIETY: ICD-10-CM

## 2017-12-12 RX ORDER — METAXALONE 800 MG/1
800 TABLET ORAL 3 TIMES DAILY
Qty: 30 TABLET | Refills: 0 | Status: SHIPPED | OUTPATIENT
Start: 2017-12-12 | End: 2017-12-22

## 2017-12-12 RX ORDER — BUSPIRONE HYDROCHLORIDE 7.5 MG/1
TABLET ORAL
Qty: 30 TABLET | Refills: 3 | Status: SHIPPED | OUTPATIENT
Start: 2017-12-12 | End: 2017-12-27

## 2017-12-22 ENCOUNTER — APPOINTMENT (OUTPATIENT)
Dept: GENERAL RADIOLOGY | Age: 42
End: 2017-12-22
Payer: COMMERCIAL

## 2017-12-22 ENCOUNTER — APPOINTMENT (OUTPATIENT)
Dept: CT IMAGING | Age: 42
End: 2017-12-22
Payer: COMMERCIAL

## 2017-12-22 ENCOUNTER — HOSPITAL ENCOUNTER (EMERGENCY)
Age: 42
Discharge: HOME HEALTH | End: 2017-12-22
Attending: EMERGENCY MEDICINE
Payer: COMMERCIAL

## 2017-12-22 VITALS
TEMPERATURE: 98.3 F | RESPIRATION RATE: 20 BRPM | WEIGHT: 117 LBS | OXYGEN SATURATION: 99 % | DIASTOLIC BLOOD PRESSURE: 56 MMHG | SYSTOLIC BLOOD PRESSURE: 95 MMHG | HEIGHT: 60 IN | BODY MASS INDEX: 22.97 KG/M2 | HEART RATE: 91 BPM

## 2017-12-22 DIAGNOSIS — S80.00XA CONTUSION OF KNEE, UNSPECIFIED LATERALITY, INITIAL ENCOUNTER: Primary | ICD-10-CM
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
## 2017-12-22 DIAGNOSIS — S00.83XA CONTUSION OF FACE, INITIAL ENCOUNTER: ICD-10-CM

## 2017-12-22 DIAGNOSIS — T14.8XXA BRUISE: ICD-10-CM

## 2017-12-22 PROCEDURE — 73564 X-RAY EXAM KNEE 4 OR MORE: CPT

## 2017-12-22 PROCEDURE — 6370000000 HC RX 637 (ALT 250 FOR IP): Performed by: EMERGENCY MEDICINE

## 2017-12-22 PROCEDURE — 73030 X-RAY EXAM OF SHOULDER: CPT

## 2017-12-22 PROCEDURE — 70486 CT MAXILLOFACIAL W/O DYE: CPT

## 2017-12-22 PROCEDURE — 99284 EMERGENCY DEPT VISIT MOD MDM: CPT

## 2017-12-22 RX ORDER — CYCLOBENZAPRINE HCL 10 MG
10 TABLET ORAL 3 TIMES DAILY PRN
Qty: 15 TABLET | Refills: 0 | Status: SHIPPED | OUTPATIENT
Start: 2017-12-22 | End: 2018-11-04

## 2017-12-22 RX ORDER — HYDROCODONE BITARTRATE AND ACETAMINOPHEN 5; 325 MG/1; MG/1
1 TABLET ORAL ONCE
Status: COMPLETED | OUTPATIENT
Start: 2017-12-22 | End: 2017-12-22

## 2017-12-22 RX ORDER — HYDROCODONE BITARTRATE AND ACETAMINOPHEN 5; 325 MG/1; MG/1
1 TABLET ORAL EVERY 6 HOURS PRN
Qty: 15 TABLET | Refills: 0 | Status: SHIPPED | OUTPATIENT
Start: 2017-12-22 | End: 2017-12-27

## 2017-12-22 RX ADMIN — HYDROCODONE BITARTRATE AND ACETAMINOPHEN 1 TABLET: 5; 325 TABLET ORAL at 09:13

## 2017-12-22 ASSESSMENT — ENCOUNTER SYMPTOMS
CONSTIPATION: 0
EYE DISCHARGE: 0
TROUBLE SWALLOWING: 0
SINUS PRESSURE: 0
ABDOMINAL PAIN: 0
BLOOD IN STOOL: 0
SHORTNESS OF BREATH: 0
EYE REDNESS: 0
CHOKING: 0
STRIDOR: 0
DIARRHEA: 0
WHEEZING: 0
SORE THROAT: 0
VOMITING: 0
EYE PAIN: 0
CHEST TIGHTNESS: 0
FACIAL SWELLING: 1
VOICE CHANGE: 0
COUGH: 0
BACK PAIN: 0

## 2017-12-22 ASSESSMENT — PAIN DESCRIPTION - FREQUENCY: FREQUENCY: CONTINUOUS

## 2017-12-22 ASSESSMENT — PAIN DESCRIPTION - PAIN TYPE
TYPE: ACUTE PAIN
TYPE: ACUTE PAIN

## 2017-12-22 ASSESSMENT — PAIN DESCRIPTION - ORIENTATION: ORIENTATION: RIGHT;LEFT

## 2017-12-22 ASSESSMENT — PAIN SCALES - GENERAL
PAINLEVEL_OUTOF10: 8
PAINLEVEL_OUTOF10: 9

## 2017-12-22 ASSESSMENT — PAIN DESCRIPTION - DESCRIPTORS: DESCRIPTORS: ACHING

## 2017-12-22 NOTE — ED PROVIDER NOTES
Musculoskeletal: Positive for arthralgias, joint swelling and myalgias. Negative for back pain, neck pain and neck stiffness. Skin: Negative for pallor and rash. Neurological: Negative for tremors, seizures, syncope, weakness, numbness and headaches. Hematological: Negative for adenopathy. Does not bruise/bleed easily. Psychiatric/Behavioral: Negative for agitation, behavioral problems, hallucinations and sleep disturbance. The patient is not hyperactive. All other systems reviewed and are negative. Except as noted above the remainder of the review of systems was reviewed and negative. PAST MEDICAL HISTORY     Past Medical History:   Diagnosis Date    Anxiety     Attempted suicide     Depression     Diverticulosis     Erosive gastritis 6/1/2017    Fatty liver     Fatty pancreas     Fibromyalgia     Fibromyalgia 11/28/2017    Gastritis     Internal hemorrhoids 6/1/2017    Irritable bowel syndrome     Osteoarthritis     Pancreatic lesion     Pancreatic mass, 7mm 6/1/2017    Pancreatic mass, 7mm, Dr Padmini Chamberlain 11/28/2017    Past heart attack     Tobacco abuse 6/1/2017         SURGICAL HISTORY       Past Surgical History:   Procedure Laterality Date    BREAST LUMPECTOMY Bilateral     CHOLECYSTECTOMY      COLONOSCOPY      ENDOSCOPIC ULTRASOUND (LOWER) N/A 10/26/2017    EUS performed by Hannah Mo MD at [address] [facility] ESOPHAGOGASTRODUODENOSCOPY TRANSORAL DIAGNOSTIC N/A 10/26/2017    EGD ESOPHAGOGASTRODUODENOSCOPY performed by Hannah Mo MD at [facility] [address] [facility] ENDOSCOPY      WISDOM TOOTH EXTRACTION           CURRENT MEDICATIONS       Previous Medications    BUSPIRONE (BUSPAR) 15 MG TABLET    Take 1 tablet by mouth 2 times daily    BUSPIRONE (BUSPAR) 7.5 MG TABLET    take 1 tablet twice daily for 1 week, then take 1 tablet 3 times a day until gone. then increase to 15 mg.     CITALOPRAM (CELEXA) 40 MG TABLET    Take 1 Normal  Motor Arm, Left (5a. ): No drift  Motor Arm, Right (5b. ): No drift  Motor Leg, Left (6a. ): No drift  Motor Leg, Right (6b. ): No drift  Limb Ataxia (7. ): Absent  Sensory (8. ): Normal  Best Language (9. ): No aphasia  Dysarthria (10. ): Normal  Extinction and Inattention (11): No neglect  Total: 0[place] Coma Scale  Eye Opening: Spontaneous  Best Verbal Response: Oriented  Best Motor Response: Obeys commands  [place] Coma Scale Score: 15        PHYSICAL EXAM    (up to 7 for level 4, 8 or more for level 5)     ED Triage Vitals [12/22/17 0855]   BP Temp Temp Source Pulse Resp SpO2 Height Weight   (!) 98/58 98.3 °F (36.8 °C) Oral 76 20 98 % 5' (1.524 m) 117 lb (53.1 kg)       Physical Exam   Constitutional: She is oriented to person, place, and time. She appears well-developed. She appears distressed. Patient laboratory walks slow slightly uncomfortable because of the jaw swelling as well as knee swelling   HENT:   Head: Normocephalic and atraumatic. Attention given to the face patient has a minimal swelling to the left mandible patient has no loose to patient had no trismus positive for swelling and bruising left mandible skin is intact no laceration  attention given to the head patient has a known hematoma or scalp laceration   Eyes: Right eye exhibits no discharge. Left eye exhibits no discharge. Neck: Normal range of motion. Neck supple. No JVD present. No tracheal deviation present. No thyromegaly present. Attention given to the neck patient has good range of motion of the neck   Cardiovascular: Normal rate, regular rhythm and normal heart sounds. Exam reveals no gallop and no friction rub. No murmur heard. Pulmonary/Chest: Breath sounds normal. No respiratory distress. She has no wheezes. Abdominal: Soft. Bowel sounds are normal. She exhibits no mass. There is no rebound. Musculoskeletal: Normal range of motion. She exhibits edema and tenderness. She exhibits no deformity.    Attention note that portions of this note were completed with a voice recognition program.  Efforts were made to edit the dictations but occasionally words are mis-transcribed.)    Shar Luong MD (electronically signed)  Attending Emergency Physician       Shar Luong MD  12/22/17 0431

## 2017-12-22 NOTE — ED NOTES
Patient resting in bed. Vitals stable. Family at bedside. Will cont.  To monitor     Yanick Fink RN  12/22/17 1016

## 2017-12-26 RX ORDER — CITALOPRAM 40 MG/1
TABLET ORAL
Qty: 30 TABLET | Refills: 3 | Status: SHIPPED | OUTPATIENT
Start: 2017-12-26 | End: 2018-01-12 | Stop reason: SDUPTHER

## 2017-12-27 ENCOUNTER — OFFICE VISIT (OUTPATIENT)
Dept: PRIMARY CARE CLINIC | Age: 42
End: 2017-12-27

## 2017-12-27 VITALS
RESPIRATION RATE: 12 BRPM | SYSTOLIC BLOOD PRESSURE: 94 MMHG | WEIGHT: 125 LBS | HEART RATE: 80 BPM | BODY MASS INDEX: 24.54 KG/M2 | DIASTOLIC BLOOD PRESSURE: 58 MMHG | TEMPERATURE: 98.5 F | HEIGHT: 60 IN

## 2017-12-27 DIAGNOSIS — S16.1XXD STRAIN OF NECK MUSCLE, SUBSEQUENT ENCOUNTER: Primary | ICD-10-CM
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
## 2017-12-27 DIAGNOSIS — S80.00XD CONTUSION OF KNEE, UNSPECIFIED LATERALITY, SUBSEQUENT ENCOUNTER: ICD-10-CM

## 2017-12-27 DIAGNOSIS — R11.0 NAUSEA: ICD-10-CM

## 2017-12-27 DIAGNOSIS — S40.012D CONTUSION OF LEFT SHOULDER, SUBSEQUENT ENCOUNTER: ICD-10-CM

## 2017-12-27 PROCEDURE — 99214 OFFICE O/P EST MOD 30 MIN: CPT | Performed by: FAMILY MEDICINE

## 2017-12-27 PROCEDURE — 4004F PT TOBACCO SCREEN RCVD TLK: CPT | Performed by: FAMILY MEDICINE

## 2017-12-27 PROCEDURE — G8420 CALC BMI NORM PARAMETERS: HCPCS | Performed by: FAMILY MEDICINE

## 2017-12-27 PROCEDURE — G8484 FLU IMMUNIZE NO ADMIN: HCPCS | Performed by: FAMILY MEDICINE

## 2017-12-27 PROCEDURE — G8427 DOCREV CUR MEDS BY ELIG CLIN: HCPCS | Performed by: FAMILY MEDICINE

## 2017-12-27 RX ORDER — METAXALONE 800 MG/1
800 TABLET ORAL 3 TIMES DAILY
Qty: 30 TABLET | Refills: 3 | Status: SHIPPED | OUTPATIENT
Start: 2017-12-27 | End: 2018-01-06

## 2017-12-27 RX ORDER — PREGABALIN 75 MG/1
75 CAPSULE ORAL 3 TIMES DAILY
Qty: 90 CAPSULE | Refills: 3 | Status: CANCELLED | OUTPATIENT
Start: 2017-12-27

## 2017-12-27 RX ORDER — ONDANSETRON 4 MG/1
4 TABLET, ORALLY DISINTEGRATING ORAL EVERY 8 HOURS PRN
Qty: 30 TABLET | Refills: 3 | Status: SHIPPED | OUTPATIENT
Start: 2017-12-27 | End: 2018-08-23 | Stop reason: SDUPTHER

## 2017-12-27 ASSESSMENT — ENCOUNTER SYMPTOMS
CHEST TIGHTNESS: 0
EYE REDNESS: 0
STRIDOR: 0
WHEEZING: 0
TROUBLE SWALLOWING: 1
APNEA: 0
DIARRHEA: 0
EYE PAIN: 0
CHOKING: 0
ABDOMINAL PAIN: 0
FACIAL SWELLING: 0
PHOTOPHOBIA: 0
COLOR CHANGE: 1
CONSTIPATION: 0
NAUSEA: 0
EYE DISCHARGE: 0
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
## 2018-03-27 ENCOUNTER — HOSPITAL ENCOUNTER (EMERGENCY)
Age: 43
Discharge: HOME | End: 2018-03-27
Attending: EMERGENCY MEDICINE

## 2018-03-27 VITALS
TEMPERATURE: 98 F | RESPIRATION RATE: 16 BRPM | OXYGEN SATURATION: 97 % | SYSTOLIC BLOOD PRESSURE: 104 MMHG | BODY MASS INDEX: 24.54 KG/M2 | HEIGHT: 60 IN | DIASTOLIC BLOOD PRESSURE: 65 MMHG | WEIGHT: 125 LBS | HEART RATE: 86 BPM

## 2018-03-27 DIAGNOSIS — R10.13 DYSPEPSIA: ICD-10-CM

## 2018-03-27 DIAGNOSIS — R10.13 ABDOMINAL PAIN, EPIGASTRIC: Primary | ICD-10-CM

## 2018-03-27 PROCEDURE — 99283 EMERGENCY DEPT VISIT LOW MDM: CPT

## 2018-03-27 PROCEDURE — 6370000000 HC RX 637 (ALT 250 FOR IP): Performed by: EMERGENCY MEDICINE

## 2018-03-27 PROCEDURE — 6360000002 HC RX W HCPCS: Performed by: EMERGENCY MEDICINE

## 2018-03-27 PROCEDURE — 96372 THER/PROPH/DIAG INJ SC/IM: CPT

## 2018-03-27 RX ORDER — ONDANSETRON 4 MG/1
4 TABLET, FILM COATED ORAL EVERY 8 HOURS PRN
Qty: 20 TABLET | Refills: 0 | Status: SHIPPED | OUTPATIENT
Start: 2018-03-27 | End: 2018-09-10 | Stop reason: SDUPTHER

## 2018-03-27 RX ORDER — BUSPIRONE HYDROCHLORIDE 7.5 MG/1
7.5 TABLET ORAL DAILY
COMMUNITY
End: 2018-06-13 | Stop reason: SINTOL

## 2018-03-27 RX ORDER — FAMOTIDINE 20 MG/1
20 TABLET, FILM COATED ORAL 2 TIMES DAILY
Qty: 60 TABLET | Refills: 0 | Status: SHIPPED | OUTPATIENT
Start: 2018-03-27 | End: 2018-04-20 | Stop reason: SDUPTHER

## 2018-03-27 RX ORDER — ORPHENADRINE CITRATE 30 MG/ML
60 INJECTION INTRAMUSCULAR; INTRAVENOUS ONCE
Status: COMPLETED | OUTPATIENT
Start: 2018-03-27 | End: 2018-03-27

## 2018-03-27 RX ORDER — FAMOTIDINE 20 MG/1
20 TABLET, FILM COATED ORAL ONCE
Status: COMPLETED | OUTPATIENT
Start: 2018-03-27 | End: 2018-03-27

## 2018-03-27 RX ADMIN — FAMOTIDINE 20 MG: 20 TABLET, FILM COATED ORAL at 01:29

## 2018-03-27 RX ADMIN — ORPHENADRINE CITRATE 60 MG: 30 INJECTION INTRAMUSCULAR; INTRAVENOUS at 01:29

## 2018-03-27 RX ADMIN — Medication 30 ML: at 01:29

## 2018-03-27 ASSESSMENT — PAIN DESCRIPTION - PAIN TYPE: TYPE: ACUTE PAIN

## 2018-03-27 ASSESSMENT — ENCOUNTER SYMPTOMS
PHOTOPHOBIA: 0
DIARRHEA: 0
BACK PAIN: 0
ABDOMINAL PAIN: 1
SORE THROAT: 0
RHINORRHEA: 0
VOMITING: 0
EYE PAIN: 0
COLOR CHANGE: 0
CONSTIPATION: 0
WHEEZING: 0
COUGH: 0
NAUSEA: 1
APNEA: 0
SINUS PRESSURE: 0
SHORTNESS OF BREATH: 0
ABDOMINAL DISTENTION: 0

## 2018-03-27 ASSESSMENT — PAIN DESCRIPTION - LOCATION: LOCATION: ABDOMEN

## 2018-03-27 ASSESSMENT — PAIN DESCRIPTION - DESCRIPTORS: DESCRIPTORS: BURNING

## 2018-03-27 ASSESSMENT — PAIN SCALES - GENERAL: PAINLEVEL_OUTOF10: 9

## 2018-03-27 NOTE — ED PROVIDER NOTES
[address] [place] [address] ED  eMERGENCY dEPARTMENT eNCOUnter      Pt Name: Ayden Partida  MRN: [id number]  [place] 1975  Date of evaluation: 3/27/2018  Provider: Asif Cha MD    CHIEF COMPLAINT       Chief Complaint   Patient presents with    Abdominal Pain     Since Thursday         HISTORY OF PRESENT ILLNESS   (Location/Symptom, Timing/Onset, Context/Setting, Quality, Duration, Modifying Factors, Severity)  Note limiting factors. Ayden Partida is a 43 y.o. female who presents to the emergency department with complaint of  Epigastric burning discomfort which is gotten worse over the last 2 days. Has nausea but no vomiting. Denies diarrhea or constipation. Known history of gastroesophageal reflux on Protonix. However is out of her Protonix and has not gone to  her prescription refills. Pain is 9 in a scale of 1-10. HPI    Nursing Notes were reviewed. REVIEW OF SYSTEMS    (2-9 systems for level 4, 10 or more for level 5)     Review of Systems   Constitutional: Negative. Negative for activity change, appetite change, chills, fatigue and fever. HENT: Negative for congestion, ear discharge, ear pain, hearing loss, rhinorrhea, sinus pressure and sore throat. Eyes: Negative for photophobia, pain and visual disturbance. Respiratory: Negative for apnea, cough, shortness of breath and wheezing. Cardiovascular: Negative for chest pain, palpitations and leg swelling. Gastrointestinal: Positive for abdominal pain and nausea. Negative for abdominal distention, constipation, diarrhea and vomiting. Endocrine: Negative for cold intolerance, heat intolerance and polyuria. Genitourinary: Negative for dysuria, flank pain, frequency and urgency. Musculoskeletal: Negative for arthralgias, back pain, gait problem, myalgias and neck stiffness. Skin: Negative for color change, pallor and rash. Allergic/Immunologic: Negative for food allergies and immunocompromised state.    Neurological: (ZOFRAN ODT) 4 MG DISINTEGRATING TABLET    Take 1 tablet by mouth every 8 hours as needed for Nausea or Vomiting    PANTOPRAZOLE (PROTONIX) 40 MG TABLET    Take 1 tablet by mouth daily    PREGABALIN (LYRICA) 75 MG CAPSULE    Take 1 capsule by mouth 3 times daily . ALLERGIES     Asa [aspirin]; Demerol hcl [meperidine]; Morphine; Nsaids; Other; Xanax [alprazolam]; and Zoloft [sertraline hcl]    FAMILY HISTORY       Family History   Problem Relation Age of Onset   Clayton Coates Cancer Mother      ovarian    Asthma Mother     Other Mother      copd,emphysema    Cancer Maternal Grandmother      ovarian    Asthma Maternal Grandmother     Other Maternal Grandmother      copd,emphysema          SOCIAL HISTORY       Social History     Social History    Marital status: Single     Spouse name: N/A    Number of children: N/A    Years of education: N/A     Social History Main Topics    Smoking status: Current Every Day Smoker     Packs/day: 0.50     Years: 30.00     Types: Cigarettes    Smokeless tobacco: Never Used    Alcohol use 1.2 oz/week     2 Cans of beer per week    Drug use: Yes     Types: Marijuana    Sexual activity: Yes     Partners: Female     Other Topics Concern    Not on file     Social History Narrative    No narrative on file       SCREENINGS    Tatyana Coma Scale  Eye Opening: Spontaneous  Best Verbal Response: Oriented  Best Motor Response: Obeys commands  [facility] Coma Scale Score: 15        PHYSICAL EXAM    (up to 7 for level 4, 8 or more for level 5)     ED Triage Vitals [03/27/18 0115]   BP Temp Temp src Pulse Resp SpO2 Height Weight   125/60 98 °F (36.7 °C) -- 95 18 98 % 5' (1.524 m) 125 lb (56.7 kg)       Physical Exam   Constitutional: She is oriented to person, place, and time. She appears well-developed and well-nourished. No distress. HENT:   Head: Normocephalic and atraumatic. Nose: Nose normal.   Mouth/Throat: Oropharynx is clear and moist. No oropharyngeal exudate.    Eyes: Conjunctivae

## 2018-04-23 RX ORDER — FAMOTIDINE 20 MG/1
20 TABLET, FILM COATED ORAL 2 TIMES DAILY
Qty: 60 TABLET | Refills: 0 | Status: SHIPPED | OUTPATIENT
Start: 2018-04-23 | End: 2018-09-10 | Stop reason: SDUPTHER

## 2018-05-03 ENCOUNTER — TELEPHONE (OUTPATIENT)
Dept: PRIMARY CARE CLINIC | Age: 43
End: 2018-05-03

## 2018-06-13 ENCOUNTER — OFFICE VISIT (OUTPATIENT)
Dept: PRIMARY CARE CLINIC | Age: 43
End: 2018-06-13
Payer: COMMERCIAL

## 2018-06-13 VITALS
HEART RATE: 63 BPM | OXYGEN SATURATION: 97 % | SYSTOLIC BLOOD PRESSURE: 100 MMHG | RESPIRATION RATE: 16 BRPM | HEIGHT: 61 IN | BODY MASS INDEX: 24.55 KG/M2 | WEIGHT: 130 LBS | DIASTOLIC BLOOD PRESSURE: 60 MMHG | TEMPERATURE: 97.3 F

## 2018-06-13 DIAGNOSIS — R53.83 OTHER FATIGUE: Primary | ICD-10-CM

## 2018-06-13 DIAGNOSIS — J30.9 ACUTE ALLERGIC RHINITIS, UNSPECIFIED SEASONALITY, UNSPECIFIED TRIGGER: ICD-10-CM

## 2018-06-13 DIAGNOSIS — F41.9 ANXIETY: ICD-10-CM

## 2018-06-13 DIAGNOSIS — Z72.0 TOBACCO ABUSE: ICD-10-CM

## 2018-06-13 DIAGNOSIS — S06.0X0S CONCUSSION WITHOUT LOSS OF CONSCIOUSNESS, SEQUELA (HCC): ICD-10-CM

## 2018-06-13 DIAGNOSIS — G40.909 NONINTRACTABLE EPILEPSY WITHOUT STATUS EPILEPTICUS, UNSPECIFIED EPILEPSY TYPE (HCC): ICD-10-CM

## 2018-06-13 DIAGNOSIS — R53.83 OTHER FATIGUE: ICD-10-CM

## 2018-06-13 LAB
ALBUMIN SERPL-MCNC: 4.4 G/DL (ref 3.9–4.9)
ALP BLD-CCNC: 47 U/L (ref 40–130)
ALT SERPL-CCNC: 10 U/L (ref 0–33)
ANION GAP SERPL CALCULATED.3IONS-SCNC: 16 MEQ/L (ref 7–13)
AST SERPL-CCNC: 22 U/L (ref 0–35)
BASOPHILS ABSOLUTE: 0 K/UL (ref 0–0.2)
BASOPHILS RELATIVE PERCENT: 0.6 %
BILIRUB SERPL-MCNC: 0.5 MG/DL (ref 0–1.2)
BUN BLDV-MCNC: 10 MG/DL (ref 6–20)
CALCIUM SERPL-MCNC: 9.4 MG/DL (ref 8.6–10.2)
CHLORIDE BLD-SCNC: 99 MEQ/L (ref 98–107)
CO2: 24 MEQ/L (ref 22–29)
CREAT SERPL-MCNC: 0.56 MG/DL (ref 0.5–0.9)
EOSINOPHILS ABSOLUTE: 0.1 K/UL (ref 0–0.7)
EOSINOPHILS RELATIVE PERCENT: 1.4 %
FOLATE: >20 NG/ML (ref 7.3–26.1)
GFR AFRICAN AMERICAN: >60
GFR NON-AFRICAN AMERICAN: >60
GLOBULIN: 2.6 G/DL (ref 2.3–3.5)
GLUCOSE BLD-MCNC: 118 MG/DL (ref 74–109)
HCT VFR BLD CALC: 44.4 % (ref 37–47)
HEMOGLOBIN: 14.7 G/DL (ref 12–16)
LYMPHOCYTES ABSOLUTE: 2.4 K/UL (ref 1–4.8)
LYMPHOCYTES RELATIVE PERCENT: 33.2 %
MCH RBC QN AUTO: 31.5 PG (ref 27–31.3)
MCHC RBC AUTO-ENTMCNC: 33.2 % (ref 33–37)
MCV RBC AUTO: 94.7 FL (ref 82–100)
MONOCYTES ABSOLUTE: 0.5 K/UL (ref 0.2–0.8)
MONOCYTES RELATIVE PERCENT: 6.7 %
NEUTROPHILS ABSOLUTE: 4.2 K/UL (ref 1.4–6.5)
NEUTROPHILS RELATIVE PERCENT: 58.1 %
PDW BLD-RTO: 14.8 % (ref 11.5–14.5)
PLATELET # BLD: 196 K/UL (ref 130–400)
POTASSIUM SERPL-SCNC: 3.9 MEQ/L (ref 3.5–5.1)
RBC # BLD: 4.69 M/UL (ref 4.2–5.4)
SODIUM BLD-SCNC: 139 MEQ/L (ref 132–144)
T3 FREE: 2.3 PG/ML (ref 2–4.4)
T4 FREE: 1.05 NG/DL (ref 0.93–1.7)
T4 TOTAL: 7.3 UG/DL (ref 4.5–11.7)
TOTAL PROTEIN: 7 G/DL (ref 6.4–8.1)
TSH SERPL DL<=0.05 MIU/L-ACNC: 1.06 UIU/ML (ref 0.27–4.2)
VITAMIN B-12: 1250 PG/ML (ref 232–1245)
VITAMIN D 25-HYDROXY: 28.3 NG/ML (ref 30–100)
WBC # BLD: 7.2 K/UL (ref 4.8–10.8)

## 2018-06-13 PROCEDURE — G8427 DOCREV CUR MEDS BY ELIG CLIN: HCPCS | Performed by: FAMILY MEDICINE

## 2018-06-13 PROCEDURE — 99214 OFFICE O/P EST MOD 30 MIN: CPT | Performed by: FAMILY MEDICINE

## 2018-06-13 PROCEDURE — 4004F PT TOBACCO SCREEN RCVD TLK: CPT | Performed by: FAMILY MEDICINE

## 2018-06-13 PROCEDURE — G8420 CALC BMI NORM PARAMETERS: HCPCS | Performed by: FAMILY MEDICINE

## 2018-06-13 RX ORDER — CITALOPRAM 40 MG/1
60 TABLET ORAL DAILY
Qty: 45 TABLET | Refills: 5 | Status: SHIPPED | OUTPATIENT
Start: 2018-06-13 | End: 2018-10-16 | Stop reason: CLARIF

## 2018-06-13 RX ORDER — MONTELUKAST SODIUM 10 MG/1
10 TABLET ORAL NIGHTLY
Qty: 30 TABLET | Refills: 3 | Status: SHIPPED | OUTPATIENT
Start: 2018-06-13 | End: 2018-09-09 | Stop reason: SDUPTHER

## 2018-06-13 RX ORDER — LEVOCETIRIZINE DIHYDROCHLORIDE 5 MG/1
5 TABLET, FILM COATED ORAL NIGHTLY
Qty: 30 TABLET | Refills: 3 | Status: SHIPPED | OUTPATIENT
Start: 2018-06-13 | End: 2018-09-09 | Stop reason: SDUPTHER

## 2018-06-13 RX ORDER — METAXALONE 800 MG/1
TABLET ORAL
COMMUNITY
Start: 2018-06-12 | End: 2018-06-13 | Stop reason: ALTCHOICE

## 2018-06-13 RX ORDER — CYCLOBENZAPRINE HCL 10 MG
10 TABLET ORAL NIGHTLY
Qty: 30 TABLET | Refills: 5 | Status: SHIPPED | OUTPATIENT
Start: 2018-06-13 | End: 2018-06-23

## 2018-06-13 ASSESSMENT — ENCOUNTER SYMPTOMS
ABDOMINAL PAIN: 0
COUGH: 0
WHEEZING: 0
PHOTOPHOBIA: 0
EYE PAIN: 0
EYE REDNESS: 0
DIARRHEA: 0
CONSTIPATION: 0
SORE THROAT: 0
CHOKING: 0
CHEST TIGHTNESS: 0
COLOR CHANGE: 0
SWOLLEN GLANDS: 0
APNEA: 0
VOMITING: 0
NAUSEA: 0
VISUAL CHANGE: 0
STRIDOR: 0
CHANGE IN BOWEL HABIT: 0
EYE DISCHARGE: 0
FACIAL SWELLING: 0

## 2018-06-13 ASSESSMENT — PATIENT HEALTH QUESTIONNAIRE - PHQ9
1. LITTLE INTEREST OR PLEASURE IN DOING THINGS: 1
SUM OF ALL RESPONSES TO PHQ QUESTIONS 1-9: 2
SUM OF ALL RESPONSES TO PHQ9 QUESTIONS 1 & 2: 2
2. FEELING DOWN, DEPRESSED OR HOPELESS: 1

## 2018-07-10 RX ORDER — PANTOPRAZOLE SODIUM 40 MG/1
TABLET, DELAYED RELEASE ORAL
Qty: 90 TABLET | Refills: 3 | Status: SHIPPED | OUTPATIENT
Start: 2018-07-10 | End: 2019-07-25

## 2018-07-18 ENCOUNTER — TELEPHONE (OUTPATIENT)
Dept: PRIMARY CARE CLINIC | Age: 43
End: 2018-07-18

## 2018-08-23 DIAGNOSIS — R11.0 NAUSEA: ICD-10-CM

## 2018-08-24 RX ORDER — ONDANSETRON 4 MG/1
TABLET, ORALLY DISINTEGRATING ORAL
Qty: 30 TABLET | Refills: 3 | Status: SHIPPED | OUTPATIENT
Start: 2018-08-24 | End: 2018-11-05 | Stop reason: SDUPTHER

## 2018-09-09 DIAGNOSIS — J30.9 ACUTE ALLERGIC RHINITIS: ICD-10-CM

## 2018-09-10 ENCOUNTER — OFFICE VISIT (OUTPATIENT)
Dept: FAMILY MEDICINE CLINIC | Age: 43
End: 2018-09-10
Payer: COMMERCIAL

## 2018-09-10 VITALS
BODY MASS INDEX: 24.1 KG/M2 | SYSTOLIC BLOOD PRESSURE: 98 MMHG | OXYGEN SATURATION: 98 % | HEIGHT: 63 IN | DIASTOLIC BLOOD PRESSURE: 60 MMHG | TEMPERATURE: 97.8 F | WEIGHT: 136 LBS | HEART RATE: 114 BPM

## 2018-09-10 DIAGNOSIS — Z13.31 POSITIVE DEPRESSION SCREENING: ICD-10-CM

## 2018-09-10 DIAGNOSIS — F32.A DEPRESSION, UNSPECIFIED DEPRESSION TYPE: ICD-10-CM

## 2018-09-10 DIAGNOSIS — F41.9 ANXIETY: Primary | ICD-10-CM

## 2018-09-10 DIAGNOSIS — K21.9 GASTROESOPHAGEAL REFLUX DISEASE WITHOUT ESOPHAGITIS: ICD-10-CM

## 2018-09-10 DIAGNOSIS — J45.40 MODERATE PERSISTENT ASTHMA, UNSPECIFIED WHETHER COMPLICATED: ICD-10-CM

## 2018-09-10 DIAGNOSIS — Z23 NEED FOR INFLUENZA VACCINATION: ICD-10-CM

## 2018-09-10 PROCEDURE — 90471 IMMUNIZATION ADMIN: CPT | Performed by: NURSE PRACTITIONER

## 2018-09-10 PROCEDURE — G8431 POS CLIN DEPRES SCRN F/U DOC: HCPCS | Performed by: NURSE PRACTITIONER

## 2018-09-10 PROCEDURE — 4004F PT TOBACCO SCREEN RCVD TLK: CPT | Performed by: NURSE PRACTITIONER

## 2018-09-10 PROCEDURE — 99214 OFFICE O/P EST MOD 30 MIN: CPT | Performed by: NURSE PRACTITIONER

## 2018-09-10 PROCEDURE — 90688 IIV4 VACCINE SPLT 0.5 ML IM: CPT | Performed by: NURSE PRACTITIONER

## 2018-09-10 PROCEDURE — G8420 CALC BMI NORM PARAMETERS: HCPCS | Performed by: NURSE PRACTITIONER

## 2018-09-10 PROCEDURE — 96160 PT-FOCUSED HLTH RISK ASSMT: CPT | Performed by: NURSE PRACTITIONER

## 2018-09-10 PROCEDURE — G8427 DOCREV CUR MEDS BY ELIG CLIN: HCPCS | Performed by: NURSE PRACTITIONER

## 2018-09-10 RX ORDER — MONTELUKAST SODIUM 10 MG/1
10 TABLET ORAL NIGHTLY
Qty: 30 TABLET | Refills: 3 | Status: SHIPPED | OUTPATIENT
Start: 2018-09-10 | End: 2019-01-03 | Stop reason: SDUPTHER

## 2018-09-10 RX ORDER — FAMOTIDINE 20 MG/1
20 TABLET, FILM COATED ORAL 2 TIMES DAILY
Qty: 60 TABLET | Refills: 0 | Status: SHIPPED | OUTPATIENT
Start: 2018-09-10 | End: 2018-10-02 | Stop reason: SDUPTHER

## 2018-09-10 RX ORDER — LEVOCETIRIZINE DIHYDROCHLORIDE 5 MG/1
5 TABLET, FILM COATED ORAL NIGHTLY
Qty: 30 TABLET | Refills: 3 | Status: SHIPPED | OUTPATIENT
Start: 2018-09-10 | End: 2019-01-03 | Stop reason: SDUPTHER

## 2018-09-10 ASSESSMENT — PATIENT HEALTH QUESTIONNAIRE - PHQ9
SUM OF ALL RESPONSES TO PHQ9 QUESTIONS 1 & 2: 5
3. TROUBLE FALLING OR STAYING ASLEEP: 1
8. MOVING OR SPEAKING SO SLOWLY THAT OTHER PEOPLE COULD HAVE NOTICED. OR THE OPPOSITE, BEING SO FIGETY OR RESTLESS THAT YOU HAVE BEEN MOVING AROUND A LOT MORE THAN USUAL: 1
7. TROUBLE CONCENTRATING ON THINGS, SUCH AS READING THE NEWSPAPER OR WATCHING TELEVISION: 2
SUM OF ALL RESPONSES TO PHQ QUESTIONS 1-9: 15
1. LITTLE INTEREST OR PLEASURE IN DOING THINGS: 3
5. POOR APPETITE OR OVEREATING: 3
SUM OF ALL RESPONSES TO PHQ QUESTIONS 1-9: 15
9. THOUGHTS THAT YOU WOULD BE BETTER OFF DEAD, OR OF HURTING YOURSELF: 0
6. FEELING BAD ABOUT YOURSELF - OR THAT YOU ARE A FAILURE OR HAVE LET YOURSELF OR YOUR FAMILY DOWN: 0
4. FEELING TIRED OR HAVING LITTLE ENERGY: 3
2. FEELING DOWN, DEPRESSED OR HOPELESS: 2
10. IF YOU CHECKED OFF ANY PROBLEMS, HOW DIFFICULT HAVE THESE PROBLEMS MADE IT FOR YOU TO DO YOUR WORK, TAKE CARE OF THINGS AT HOME, OR GET ALONG WITH OTHER PEOPLE: 3

## 2018-09-10 ASSESSMENT — ENCOUNTER SYMPTOMS
COUGH: 0
SHORTNESS OF BREATH: 0
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
## 2018-10-01 ENCOUNTER — TELEPHONE (OUTPATIENT)
Dept: FAMILY MEDICINE CLINIC | Age: 43
End: 2018-10-01

## 2018-10-01 DIAGNOSIS — F32.A DEPRESSION, UNSPECIFIED DEPRESSION TYPE: Primary | ICD-10-CM

## 2018-10-01 RX ORDER — VILAZODONE HYDROCHLORIDE 20 MG/1
20 TABLET ORAL DAILY
Qty: 30 TABLET | Refills: 5 | Status: SHIPPED | OUTPATIENT
Start: 2018-10-01 | End: 2019-01-11

## 2018-10-02 DIAGNOSIS — K21.9 GASTROESOPHAGEAL REFLUX DISEASE WITHOUT ESOPHAGITIS: ICD-10-CM

## 2018-10-03 RX ORDER — FAMOTIDINE 20 MG/1
20 TABLET, FILM COATED ORAL 2 TIMES DAILY
Qty: 60 TABLET | Refills: 2 | Status: SHIPPED | OUTPATIENT
Start: 2018-10-03 | End: 2018-12-03 | Stop reason: SDUPTHER

## 2018-10-12 ENCOUNTER — OFFICE VISIT (OUTPATIENT)
Dept: BEHAVIORAL/MENTAL HEALTH CLINIC | Age: 43
End: 2018-10-12
Payer: COMMERCIAL

## 2018-10-12 DIAGNOSIS — F43.23 ADJUSTMENT DISORDER WITH MIXED ANXIETY AND DEPRESSED MOOD: Primary | ICD-10-CM

## 2018-10-12 PROCEDURE — 90791 PSYCH DIAGNOSTIC EVALUATION: CPT | Performed by: PSYCHOLOGIST

## 2018-10-12 ASSESSMENT — PATIENT HEALTH QUESTIONNAIRE - PHQ9
5. POOR APPETITE OR OVEREATING: 3
9. THOUGHTS THAT YOU WOULD BE BETTER OFF DEAD, OR OF HURTING YOURSELF: 0
SUM OF ALL RESPONSES TO PHQ9 QUESTIONS 1 & 2: 5
7. TROUBLE CONCENTRATING ON THINGS, SUCH AS READING THE NEWSPAPER OR WATCHING TELEVISION: 1
4. FEELING TIRED OR HAVING LITTLE ENERGY: 3
SUM OF ALL RESPONSES TO PHQ QUESTIONS 1-9: 16
2. FEELING DOWN, DEPRESSED OR HOPELESS: 2
1. LITTLE INTEREST OR PLEASURE IN DOING THINGS: 3
6. FEELING BAD ABOUT YOURSELF - OR THAT YOU ARE A FAILURE OR HAVE LET YOURSELF OR YOUR FAMILY DOWN: 1
3. TROUBLE FALLING OR STAYING ASLEEP: 3
SUM OF ALL RESPONSES TO PHQ QUESTIONS 1-9: 16
8. MOVING OR SPEAKING SO SLOWLY THAT OTHER PEOPLE COULD HAVE NOTICED. OR THE OPPOSITE, BEING SO FIGETY OR RESTLESS THAT YOU HAVE BEEN MOVING AROUND A LOT MORE THAN USUAL: 0
10. IF YOU CHECKED OFF ANY PROBLEMS, HOW DIFFICULT HAVE THESE PROBLEMS MADE IT FOR YOU TO DO YOUR WORK, TAKE CARE OF THINGS AT HOME, OR GET ALONG WITH OTHER PEOPLE: 1

## 2018-10-12 NOTE — PATIENT INSTRUCTIONS
the susanas guided breathing instruction. Giselle Seat learn how to breathe deeply, hold, and then release with better control. If it works for you, you can purchase the full course which gives you access to the entire program.    Breathing Zone   Platform: rPath & Android  Cost: $3.99   Breathing Zone uses a clinically proven therapeutic breathing exercise that decreases your heart rate, and with daily use can help manage high blood pressure.    ----------------------------------------------  Self-Help for Anxiety Management (ANTOINE)  Platform: [employer] & Android  Cost: Free  The Self-Help for Anxiety Management (ANTOINE) susana from the [employer] can help you regain control of your anxiety and emotions. Tell the susana how youre feeling, how anxious you are, or how worried you are. Then let the susanas self-help features walk you through some calming or relaxation practices. If you want, you can connect with a social network of other [facility] users. Dont worry, the network isnt connected to larger networks like Twitter or [employer]. Stop Panic & Anxiety Help  Platform: Android  Cost: Free  If panic and anxiety attacks have a  on your life, this susana might help you let them go. The Stop Panic & Anxiety Help Android susana uses emotion and relaxation training audio tracks to help you fight your fears and find a state of calm. When youve overcome the attack, use the eelusion journal to record what caused the attack and how you were able to get through it. Then use this journal to learn from your experiences and prepare for the future. I Can Be Fearless by [employer]  Platform: rPath  Cost: Free  When you were younger, your parents might have told you that you could do anything you put your mind to. This susana might not help you be an astronaut or a world famous actress, but it can help you break through your anxiety, fears, and worries to a place of calm and confidence.  Open your Apple device and select what

## 2018-10-12 NOTE — PROGRESS NOTES
needed for Nausea or Vomiting 30 tablet 3    pantoprazole (PROTONIX) 40 MG tablet TAKE 1 TABLET BY MOUTH EVERY DAY 90 tablet 3    citalopram (CELEXA) 40 MG tablet Take 1.5 tablets by mouth daily 45 tablet 5    cyclobenzaprine (FLEXERIL) 10 MG tablet Take 1 tablet by mouth 3 times daily as needed for Muscle spasms 15 tablet 0    diphenhydrAMINE (BENADRYL) 25 MG tablet Take 25 mg by mouth every 6 hours as needed for Itching       No current facility-administered medications for this visit. Social History:   Social History     Social History    Marital status: Single     Spouse name: N/A    Number of children: N/A    Years of education: N/A     Occupational History    Not on file. Social History Main Topics    Smoking status: Current Every Day Smoker     Packs/day: 0.50     Years: 30.00     Types: Cigarettes    Smokeless tobacco: Never Used    Alcohol use 1.2 oz/week     2 Cans of beer per week    Drug use: Yes     Types: Marijuana    Sexual activity: Yes     Partners: Female     Other Topics Concern    Not on file     Social History Narrative    No narrative on file         Family History:   Family History   Problem Relation Age of Onset    Cancer Mother         ovarian    Asthma Mother     Other Mother         copd,emphysema    High Cholesterol Mother     Cancer Maternal Grandmother         ovarian    Asthma Maternal Grandmother     Other Maternal Grandmother         copd,emphysema    High Blood Pressure Sister     Diabetes Paternal Grandfather     Breast Cancer Neg Hx     Colon Cancer Neg Hx     Heart Attack Neg Hx     Heart Disease Neg Hx     Prostate Cancer Neg Hx     Stroke Neg Hx        TOBACCO:   reports that she has been smoking Cigarettes. She has a 15.00 pack-year smoking history. She has never used smokeless tobacco.  ETOH:   reports that she drinks about 1.2 oz of alcohol per week .        O:  MSE:    Appearance    alert, mild distress   Personal Hygiene :

## 2018-10-13 RX ORDER — MOMETASONE FUROATE AND FORMOTEROL FUMARATE DIHYDRATE 200; 5 UG/1; UG/1
2 AEROSOL RESPIRATORY (INHALATION) 2 TIMES DAILY
Qty: 13 G | Refills: 3 | OUTPATIENT
Start: 2018-10-13

## 2018-10-13 NOTE — TELEPHONE ENCOUNTER
Patient was last seen on 6-13-18  Last Prescribed 11-9-17    Medication is pending  Please approve or deny this request

## 2018-10-16 ENCOUNTER — OFFICE VISIT (OUTPATIENT)
Dept: FAMILY MEDICINE CLINIC | Age: 43
End: 2018-10-16
Payer: COMMERCIAL

## 2018-10-16 VITALS
BODY MASS INDEX: 25.47 KG/M2 | SYSTOLIC BLOOD PRESSURE: 98 MMHG | HEIGHT: 62 IN | OXYGEN SATURATION: 98 % | WEIGHT: 138.4 LBS | HEART RATE: 117 BPM | DIASTOLIC BLOOD PRESSURE: 60 MMHG | TEMPERATURE: 97.6 F

## 2018-10-16 DIAGNOSIS — R00.0 TACHYCARDIA: Primary | ICD-10-CM

## 2018-10-16 DIAGNOSIS — M79.7 FIBROMYALGIA: ICD-10-CM

## 2018-10-16 DIAGNOSIS — M62.838 MUSCLE SPASMS OF NECK: ICD-10-CM

## 2018-10-16 DIAGNOSIS — R00.2 PALPITATIONS: ICD-10-CM

## 2018-10-16 PROCEDURE — 4004F PT TOBACCO SCREEN RCVD TLK: CPT | Performed by: NURSE PRACTITIONER

## 2018-10-16 PROCEDURE — G8419 CALC BMI OUT NRM PARAM NOF/U: HCPCS | Performed by: NURSE PRACTITIONER

## 2018-10-16 PROCEDURE — 99214 OFFICE O/P EST MOD 30 MIN: CPT | Performed by: NURSE PRACTITIONER

## 2018-10-16 PROCEDURE — G8482 FLU IMMUNIZE ORDER/ADMIN: HCPCS | Performed by: NURSE PRACTITIONER

## 2018-10-16 PROCEDURE — G8427 DOCREV CUR MEDS BY ELIG CLIN: HCPCS | Performed by: NURSE PRACTITIONER

## 2018-10-16 RX ORDER — TIZANIDINE 4 MG/1
4 TABLET ORAL EVERY 8 HOURS PRN
Qty: 90 TABLET | Refills: 3 | Status: SHIPPED | OUTPATIENT
Start: 2018-10-16 | End: 2019-01-27 | Stop reason: SDUPTHER

## 2018-10-16 ASSESSMENT — ENCOUNTER SYMPTOMS: SHORTNESS OF BREATH: 0

## 2018-10-16 NOTE — PROGRESS NOTES
Not on file     Social History Narrative    No narrative on file     Current Outpatient Prescriptions on File Prior to Visit   Medication Sig Dispense Refill    famotidine (PEPCID) 20 MG tablet Take 1 tablet by mouth 2 times daily 60 tablet 2    vilazodone HCl (VILAZODONE HCL) 20 MG TABS Take 1 tablet by mouth daily 30 tablet 5    levocetirizine (XYZAL) 5 MG tablet Take 1 tablet by mouth nightly 30 tablet 3    montelukast (SINGULAIR) 10 MG tablet Take 1 tablet by mouth nightly 30 tablet 3    fluticasone propionate (FLOVENT DISKUS) 100 MCG/BLIST AEPB inhaler Inhale 2 puffs into the lungs daily 60 each 5    ondansetron (ZOFRAN-ODT) 4 MG disintegrating tablet dissolve 1 tablet in mouth every 8 hours as needed for Nausea or Vomiting 30 tablet 3    pantoprazole (PROTONIX) 40 MG tablet TAKE 1 TABLET BY MOUTH EVERY DAY 90 tablet 3    cyclobenzaprine (FLEXERIL) 10 MG tablet Take 1 tablet by mouth 3 times daily as needed for Muscle spasms 15 tablet 0    diphenhydrAMINE (BENADRYL) 25 MG tablet Take 25 mg by mouth every 6 hours as needed for Itching       No current facility-administered medications on file prior to visit. Allergies   Allergen Reactions    Asa [Aspirin]     Demerol Hcl [Meperidine]     Morphine     Nsaids     Other      All oral narcotics    Xanax [Alprazolam]      \"left side of her body went numb\"    Zoloft [Sertraline Hcl] Other (See Comments)     Migraine trigger    Trintellix [Vortioxetine] Nausea And Vomiting       Review of Systems   Respiratory: Negative for shortness of breath. Cardiovascular: Positive for palpitations. Negative for chest pain. Musculoskeletal: Positive for neck pain (and shoulder). Neurological: Positive for headaches. Psychiatric/Behavioral: Negative for dysphoric mood. The patient is nervous/anxious.         Objective  Vitals:    10/16/18 1120   BP: 98/60   Pulse: 117   Temp: 97.6 °F (36.4 °C)   SpO2: 98%   Weight: 138 lb 6.4 oz (62.8 kg)   Height: 5' emergency room.      Jose Avery, APRN - CNP

## 2018-11-01 RX ORDER — MOMETASONE FUROATE AND FORMOTEROL FUMARATE DIHYDRATE 200; 5 UG/1; UG/1
2 AEROSOL RESPIRATORY (INHALATION) 2 TIMES DAILY
Qty: 13 G | Refills: 3 | Status: SHIPPED | OUTPATIENT
Start: 2018-11-01 | End: 2018-11-06 | Stop reason: CLARIF

## 2018-11-01 RX ORDER — MOMETASONE FUROATE AND FORMOTEROL FUMARATE DIHYDRATE 200; 5 UG/1; UG/1
2 AEROSOL RESPIRATORY (INHALATION) 2 TIMES DAILY
Qty: 13 G | OUTPATIENT
Start: 2018-11-01

## 2018-11-04 ENCOUNTER — APPOINTMENT (OUTPATIENT)
Dept: CT IMAGING | Age: 43
End: 2018-11-04
Payer: COMMERCIAL

## 2018-11-04 ENCOUNTER — HOSPITAL ENCOUNTER (EMERGENCY)
Age: 43
Discharge: HOME | End: 2018-11-04
Attending: EMERGENCY MEDICINE
Payer: COMMERCIAL

## 2018-11-04 VITALS
HEART RATE: 61 BPM | WEIGHT: 135 LBS | DIASTOLIC BLOOD PRESSURE: 70 MMHG | BODY MASS INDEX: 24.84 KG/M2 | OXYGEN SATURATION: 100 % | HEIGHT: 62 IN | SYSTOLIC BLOOD PRESSURE: 120 MMHG | RESPIRATION RATE: 17 BRPM | TEMPERATURE: 98.6 F

## 2018-11-04 DIAGNOSIS — M54.31 SCIATICA OF RIGHT SIDE: Primary | ICD-10-CM

## 2018-11-04 PROCEDURE — 99283 EMERGENCY DEPT VISIT LOW MDM: CPT

## 2018-11-04 PROCEDURE — 6370000000 HC RX 637 (ALT 250 FOR IP): Performed by: EMERGENCY MEDICINE

## 2018-11-04 PROCEDURE — 72131 CT LUMBAR SPINE W/O DYE: CPT

## 2018-11-04 RX ORDER — METHYLPREDNISOLONE 4 MG/1
TABLET ORAL
Qty: 1 KIT | Refills: 0 | Status: SHIPPED | OUTPATIENT
Start: 2018-11-04 | End: 2019-09-24 | Stop reason: SDUPTHER

## 2018-11-04 RX ORDER — GABAPENTIN 300 MG/1
300 CAPSULE ORAL 3 TIMES DAILY
Qty: 50 CAPSULE | Refills: 0 | Status: SHIPPED | OUTPATIENT
Start: 2018-11-04 | End: 2019-01-11 | Stop reason: ALTCHOICE

## 2018-11-04 RX ORDER — PREDNISONE 20 MG/1
60 TABLET ORAL ONCE
Status: COMPLETED | OUTPATIENT
Start: 2018-11-04 | End: 2018-11-04

## 2018-11-04 RX ORDER — HYDROCODONE BITARTRATE AND ACETAMINOPHEN 5; 325 MG/1; MG/1
2 TABLET ORAL ONCE
Status: COMPLETED | OUTPATIENT
Start: 2018-11-04 | End: 2018-11-04

## 2018-11-04 RX ORDER — GABAPENTIN 300 MG/1
300 CAPSULE ORAL ONCE
Status: COMPLETED | OUTPATIENT
Start: 2018-11-04 | End: 2018-11-04

## 2018-11-04 RX ORDER — TRAMADOL HYDROCHLORIDE 50 MG/1
50 TABLET ORAL EVERY 6 HOURS PRN
Qty: 12 TABLET | Refills: 0 | Status: SHIPPED | OUTPATIENT
Start: 2018-11-04 | End: 2018-11-06 | Stop reason: SDUPTHER

## 2018-11-04 RX ORDER — METHOCARBAMOL 750 MG/1
TABLET, FILM COATED ORAL
Qty: 25 TABLET | Refills: 0 | Status: SHIPPED | OUTPATIENT
Start: 2018-11-04 | End: 2019-01-11 | Stop reason: ALTCHOICE

## 2018-11-04 RX ADMIN — PREDNISONE 60 MG: 20 TABLET ORAL at 18:50

## 2018-11-04 RX ADMIN — HYDROCODONE BITARTRATE AND ACETAMINOPHEN 2 TABLET: 5; 325 TABLET ORAL at 18:50

## 2018-11-04 RX ADMIN — GABAPENTIN 300 MG: 300 CAPSULE ORAL at 18:50

## 2018-11-04 ASSESSMENT — PAIN DESCRIPTION - ORIENTATION: ORIENTATION: RIGHT

## 2018-11-04 ASSESSMENT — PAIN DESCRIPTION - LOCATION: LOCATION: HIP

## 2018-11-04 ASSESSMENT — PAIN DESCRIPTION - DESCRIPTORS
DESCRIPTORS: ACHING
DESCRIPTORS: CONSTANT

## 2018-11-04 ASSESSMENT — PAIN SCALES - GENERAL
PAINLEVEL_OUTOF10: 2
PAINLEVEL_OUTOF10: 7

## 2018-11-05 DIAGNOSIS — R11.0 NAUSEA: ICD-10-CM

## 2018-11-06 ENCOUNTER — OFFICE VISIT (OUTPATIENT)
Dept: FAMILY MEDICINE CLINIC | Age: 43
End: 2018-11-06
Payer: COMMERCIAL

## 2018-11-06 VITALS
SYSTOLIC BLOOD PRESSURE: 104 MMHG | HEIGHT: 62 IN | WEIGHT: 143.2 LBS | HEART RATE: 73 BPM | DIASTOLIC BLOOD PRESSURE: 62 MMHG | TEMPERATURE: 98.6 F | OXYGEN SATURATION: 98 % | BODY MASS INDEX: 26.35 KG/M2

## 2018-11-06 DIAGNOSIS — M54.31 SCIATICA OF RIGHT SIDE: ICD-10-CM

## 2018-11-06 DIAGNOSIS — Z13.220 LIPID SCREENING: ICD-10-CM

## 2018-11-06 DIAGNOSIS — H65.02 ACUTE SEROUS OTITIS MEDIA OF LEFT EAR, RECURRENCE NOT SPECIFIED: ICD-10-CM

## 2018-11-06 DIAGNOSIS — J45.40 MODERATE PERSISTENT ASTHMA, UNSPECIFIED WHETHER COMPLICATED: ICD-10-CM

## 2018-11-06 DIAGNOSIS — M48.061 NEURAL FORAMINAL STENOSIS OF LUMBAR SPINE: Primary | ICD-10-CM

## 2018-11-06 PROCEDURE — 4004F PT TOBACCO SCREEN RCVD TLK: CPT | Performed by: NURSE PRACTITIONER

## 2018-11-06 PROCEDURE — G8427 DOCREV CUR MEDS BY ELIG CLIN: HCPCS | Performed by: NURSE PRACTITIONER

## 2018-11-06 PROCEDURE — G8419 CALC BMI OUT NRM PARAM NOF/U: HCPCS | Performed by: NURSE PRACTITIONER

## 2018-11-06 PROCEDURE — 99214 OFFICE O/P EST MOD 30 MIN: CPT | Performed by: NURSE PRACTITIONER

## 2018-11-06 PROCEDURE — G8482 FLU IMMUNIZE ORDER/ADMIN: HCPCS | Performed by: NURSE PRACTITIONER

## 2018-11-06 RX ORDER — AMOXICILLIN 500 MG/1
500 CAPSULE ORAL 2 TIMES DAILY
Qty: 20 CAPSULE | Refills: 0 | Status: SHIPPED | OUTPATIENT
Start: 2018-11-06 | End: 2018-11-16

## 2018-11-06 RX ORDER — ONDANSETRON 4 MG/1
TABLET, ORALLY DISINTEGRATING ORAL
Qty: 30 TABLET | Refills: 0 | Status: SHIPPED | OUTPATIENT
Start: 2018-11-06 | End: 2019-01-26 | Stop reason: SDUPTHER

## 2018-11-06 RX ORDER — TRAMADOL HYDROCHLORIDE 50 MG/1
50 TABLET ORAL EVERY 6 HOURS PRN
Qty: 12 TABLET | Refills: 0 | Status: SHIPPED | OUTPATIENT
Start: 2018-11-06 | End: 2018-11-09

## 2018-11-06 RX ORDER — FLUTICASONE PROPIONATE 110 UG/1
1 AEROSOL, METERED RESPIRATORY (INHALATION) 2 TIMES DAILY
Qty: 1 INHALER | Refills: 3 | Status: SHIPPED | OUTPATIENT
Start: 2018-11-06 | End: 2019-02-03 | Stop reason: SDUPTHER

## 2018-11-06 RX ORDER — CAFFEINE 200 MG
200 TABLET ORAL 2 TIMES DAILY
COMMUNITY
End: 2019-10-30

## 2018-11-06 ASSESSMENT — ENCOUNTER SYMPTOMS
BACK PAIN: 1
COUGH: 0
SHORTNESS OF BREATH: 0

## 2018-11-08 PROBLEM — M25.552 PAIN OF BOTH HIP JOINTS: Status: ACTIVE | Noted: 2018-11-08

## 2018-11-08 PROBLEM — M47.812 CERVICAL SPONDYLOSIS WITHOUT MYELOPATHY: Status: ACTIVE | Noted: 2018-11-08

## 2018-11-08 PROBLEM — M25.551 PAIN OF BOTH HIP JOINTS: Status: ACTIVE | Noted: 2018-11-08

## 2018-11-19 PROBLEM — M79.18 MYOFASCIAL PAIN SYNDROME: Status: ACTIVE | Noted: 2018-11-19

## 2018-11-21 PROBLEM — M47.816 LUMBAR SPONDYLOSIS: Status: ACTIVE | Noted: 2018-11-21

## 2018-11-23 ASSESSMENT — ENCOUNTER SYMPTOMS
NAUSEA: 0
VOMITING: 0
FACIAL SWELLING: 0
SORE THROAT: 0
TROUBLE SWALLOWING: 0
EYE ITCHING: 0
COLOR CHANGE: 0
VOICE CHANGE: 0
STRIDOR: 0
DIARRHEA: 0
EYE DISCHARGE: 0
CHEST TIGHTNESS: 0
BLOOD IN STOOL: 0
WHEEZING: 0
BACK PAIN: 1
CONSTIPATION: 0
EYE REDNESS: 0
RHINORRHEA: 0
EYE PAIN: 0
ABDOMINAL PAIN: 0
COUGH: 0
CHOKING: 0
SINUS PRESSURE: 0
PHOTOPHOBIA: 0
ANAL BLEEDING: 0
SHORTNESS OF BREATH: 0
ABDOMINAL DISTENTION: 0

## 2018-12-03 DIAGNOSIS — R11.0 NAUSEA: ICD-10-CM

## 2018-12-04 RX ORDER — ONDANSETRON 4 MG/1
TABLET, ORALLY DISINTEGRATING ORAL
Qty: 30 TABLET | OUTPATIENT
Start: 2018-12-04

## 2018-12-12 ENCOUNTER — OFFICE VISIT (OUTPATIENT)
Dept: FAMILY MEDICINE CLINIC | Age: 43
End: 2018-12-12
Payer: COMMERCIAL

## 2018-12-12 VITALS
TEMPERATURE: 98.2 F | WEIGHT: 144 LBS | BODY MASS INDEX: 26.5 KG/M2 | HEIGHT: 62 IN | DIASTOLIC BLOOD PRESSURE: 68 MMHG | SYSTOLIC BLOOD PRESSURE: 100 MMHG

## 2018-12-12 DIAGNOSIS — J01.40 ACUTE PANSINUSITIS, RECURRENCE NOT SPECIFIED: Primary | ICD-10-CM

## 2018-12-12 DIAGNOSIS — M54.6 ACUTE MIDLINE THORACIC BACK PAIN: ICD-10-CM

## 2018-12-12 PROCEDURE — G8427 DOCREV CUR MEDS BY ELIG CLIN: HCPCS | Performed by: NURSE PRACTITIONER

## 2018-12-12 PROCEDURE — G8482 FLU IMMUNIZE ORDER/ADMIN: HCPCS | Performed by: NURSE PRACTITIONER

## 2018-12-12 PROCEDURE — G8419 CALC BMI OUT NRM PARAM NOF/U: HCPCS | Performed by: NURSE PRACTITIONER

## 2018-12-12 PROCEDURE — 99214 OFFICE O/P EST MOD 30 MIN: CPT | Performed by: NURSE PRACTITIONER

## 2018-12-12 PROCEDURE — 4004F PT TOBACCO SCREEN RCVD TLK: CPT | Performed by: NURSE PRACTITIONER

## 2018-12-12 RX ORDER — AMOXICILLIN AND CLAVULANATE POTASSIUM 875; 125 MG/1; MG/1
1 TABLET, FILM COATED ORAL 2 TIMES DAILY
Qty: 20 TABLET | Refills: 0 | Status: SHIPPED | OUTPATIENT
Start: 2018-12-12 | End: 2018-12-22

## 2018-12-12 RX ORDER — METHYLPREDNISOLONE 4 MG/1
TABLET ORAL
Qty: 21 TABLET | Refills: 0 | Status: SHIPPED | OUTPATIENT
Start: 2018-12-12 | End: 2018-12-18

## 2018-12-12 ASSESSMENT — ENCOUNTER SYMPTOMS
BACK PAIN: 1
SINUS PRESSURE: 1

## 2018-12-12 NOTE — PROGRESS NOTES
 Right sided sciatica 10/28/2017    Nausea 10/02/2017    Fatty liver 06/01/2017    Tobacco abuse 06/01/2017    Internal hemorrhoids 06/01/2017    Erosive gastritis 06/01/2017    Irritable bowel syndrome 06/01/2017     Past Medical History:   Diagnosis Date    Anxiety     Asthma     Attempted suicide ([facility] [address]     Depression     Diverticulosis     Erosive gastritis 6/1/2017    Fatty liver     Fatty pancreas     Fibromyalgia     Fibromyalgia 11/28/2017    Gastritis     Internal hemorrhoids 6/1/2017    Irritable bowel syndrome     Osteoarthritis     Pancreatic lesion     Pancreatic mass, 7mm 6/1/2017    Pancreatic mass, 7mm, Dr Isaac Catholic 11/28/2017    Past heart attack     Tobacco abuse 6/1/2017     Past Surgical History:   Procedure Laterality Date    BREAST LUMPECTOMY Bilateral     CHOLECYSTECTOMY      COLONOSCOPY      ENDOSCOPIC ULTRASOUND (LOWER) N/A 10/26/2017    [facility]      one and 1/2 ovaries are left    KS ESOPHAGOGASTRODUODENOSCOPY TRANSORAL DIAGNOSTIC N/A 10/26/2017    EGD ESOPHAGOGASTRODUODENOSCOPY performed by Tri Lima MD at [address] [facility] UPPER GASTROINTESTINAL ENDOSCOPY      WISDOM TOOTH EXTRACTION       Family History   Problem Relation Age of Onset    Cancer Mother         ovarian    Asthma Mother     Other Mother         copd,emphysema    High Cholesterol Mother     Cancer Maternal Grandmother         ovarian    Asthma Maternal Grandmother     Other Maternal Grandmother         copd,emphysema    High Blood Pressure Sister     Diabetes Paternal Grandfather     Breast Cancer Neg Hx     Colon Cancer Neg Hx     Heart Attack Neg Hx     Heart Disease Neg Hx     Prostate Cancer Neg Hx     Stroke Neg Hx      Social History     Social History    Marital status: Single     Spouse name: N/A    Number of children: N/A    Years of education: N/A     Social as needed for Itching       No current facility-administered medications on file prior to visit. Allergies   Allergen Reactions    Asa [Aspirin]     Demerol Hcl [Meperidine]     Mobic [Meloxicam] Other (See Comments)     bleeding    Morphine     Nsaids     Other      All oral narcotics, can take. Causes severe constipation. Worse then normal     Xanax [Alprazolam]      \"left side of her body went numb\"    Zoloft [Sertraline Hcl] Other (See Comments)     Migraine trigger    Trintellix [Vortioxetine] Nausea And Vomiting       Review of Systems   HENT: Positive for congestion, ear pain and sinus pressure. Musculoskeletal: Positive for back pain. Neurological: Positive for headaches. Negative for tingling, weakness, numbness and paresthesias. Objective  Vitals:    12/12/18 1120   BP: 100/68   Site: Left Upper Arm   Position: Sitting   Cuff Size: Medium Adult   Temp: 98.2 °F (36.8 °C)   Weight: 144 lb (65.3 kg)   Height: 5' 2\" (1.575 m)     Physical Exam   Constitutional: She is oriented to person, place, and time. She appears well-developed and well-nourished. HENT:   Head: Normocephalic. Right Ear: External ear normal. A middle ear effusion is present. Left Ear: External ear normal. A middle ear effusion is present. Mouth/Throat: Posterior oropharyngeal erythema present. Eyes: Pupils are equal, round, and reactive to light. Conjunctivae and EOM are normal.   Neck: Neck supple. No thyromegaly present. Cardiovascular: Normal rate, regular rhythm, normal heart sounds and intact distal pulses. Pulmonary/Chest: Effort normal and breath sounds normal.   Musculoskeletal:        Thoracic back: She exhibits decreased range of motion, tenderness, swelling, pain and spasm. She exhibits no bony tenderness, no edema, no deformity, no laceration and normal pulse. Lymphadenopathy:     She has no cervical adenopathy. Neurological: She is alert and oriented to person, place, and time.    Skin:

## 2019-01-02 DIAGNOSIS — J30.9 ACUTE ALLERGIC RHINITIS: ICD-10-CM

## 2019-01-02 RX ORDER — MONTELUKAST SODIUM 10 MG/1
10 TABLET ORAL NIGHTLY
Qty: 30 TABLET | OUTPATIENT
Start: 2019-01-02

## 2019-01-02 RX ORDER — LEVOCETIRIZINE DIHYDROCHLORIDE 5 MG/1
5 TABLET, FILM COATED ORAL NIGHTLY
Qty: 30 TABLET | OUTPATIENT
Start: 2019-01-02

## 2019-01-03 RX ORDER — LEVOCETIRIZINE DIHYDROCHLORIDE 5 MG/1
5 TABLET, FILM COATED ORAL NIGHTLY
Qty: 30 TABLET | Refills: 3 | Status: SHIPPED | OUTPATIENT
Start: 2019-01-03 | End: 2019-07-03 | Stop reason: SDUPTHER

## 2019-01-03 RX ORDER — MONTELUKAST SODIUM 10 MG/1
10 TABLET ORAL NIGHTLY
Qty: 30 TABLET | Refills: 3 | Status: SHIPPED | OUTPATIENT
Start: 2019-01-03 | End: 2019-07-03 | Stop reason: SDUPTHER

## 2019-01-11 ENCOUNTER — OFFICE VISIT (OUTPATIENT)
Dept: FAMILY MEDICINE CLINIC | Age: 44
End: 2019-01-11
Payer: COMMERCIAL

## 2019-01-11 VITALS
OXYGEN SATURATION: 99 % | SYSTOLIC BLOOD PRESSURE: 80 MMHG | WEIGHT: 141 LBS | BODY MASS INDEX: 25.95 KG/M2 | HEIGHT: 62 IN | DIASTOLIC BLOOD PRESSURE: 58 MMHG | HEART RATE: 92 BPM | TEMPERATURE: 98 F

## 2019-01-11 DIAGNOSIS — J01.40 ACUTE NON-RECURRENT PANSINUSITIS: Primary | ICD-10-CM

## 2019-01-11 DIAGNOSIS — F41.0 PANIC ATTACKS: ICD-10-CM

## 2019-01-11 PROCEDURE — G8419 CALC BMI OUT NRM PARAM NOF/U: HCPCS | Performed by: NURSE PRACTITIONER

## 2019-01-11 PROCEDURE — 99213 OFFICE O/P EST LOW 20 MIN: CPT | Performed by: NURSE PRACTITIONER

## 2019-01-11 PROCEDURE — G8427 DOCREV CUR MEDS BY ELIG CLIN: HCPCS | Performed by: NURSE PRACTITIONER

## 2019-01-11 PROCEDURE — 4004F PT TOBACCO SCREEN RCVD TLK: CPT | Performed by: NURSE PRACTITIONER

## 2019-01-11 PROCEDURE — G8482 FLU IMMUNIZE ORDER/ADMIN: HCPCS | Performed by: NURSE PRACTITIONER

## 2019-01-11 RX ORDER — CEFDINIR 300 MG/1
300 CAPSULE ORAL 2 TIMES DAILY
Qty: 20 CAPSULE | Refills: 0 | Status: SHIPPED | OUTPATIENT
Start: 2019-01-11 | End: 2019-01-21

## 2019-01-11 RX ORDER — HYDROXYZINE PAMOATE 25 MG/1
25 CAPSULE ORAL 3 TIMES DAILY PRN
Qty: 15 CAPSULE | Refills: 0 | Status: SHIPPED | OUTPATIENT
Start: 2019-01-11 | End: 2019-01-31 | Stop reason: SDUPTHER

## 2019-01-11 ASSESSMENT — ENCOUNTER SYMPTOMS
COUGH: 1
RHINORRHEA: 1
SINUS PRESSURE: 1
SHORTNESS OF BREATH: 0
SINUS PAIN: 1
SORE THROAT: 1

## 2019-01-26 DIAGNOSIS — R11.0 NAUSEA: ICD-10-CM

## 2019-01-27 DIAGNOSIS — M62.838 MUSCLE SPASMS OF NECK: ICD-10-CM

## 2019-01-27 DIAGNOSIS — M79.7 FIBROMYALGIA: ICD-10-CM

## 2019-01-28 RX ORDER — ONDANSETRON 4 MG/1
TABLET, ORALLY DISINTEGRATING ORAL
Qty: 30 TABLET | Refills: 2 | Status: SHIPPED | OUTPATIENT
Start: 2019-01-28 | End: 2019-07-03 | Stop reason: SDUPTHER

## 2019-01-29 RX ORDER — TIZANIDINE 4 MG/1
4 TABLET ORAL EVERY 8 HOURS PRN
Qty: 300 TABLET | Refills: 0 | Status: SHIPPED | OUTPATIENT
Start: 2019-01-29 | End: 2019-07-03 | Stop reason: SDUPTHER

## 2019-01-31 ENCOUNTER — OFFICE VISIT (OUTPATIENT)
Dept: FAMILY MEDICINE CLINIC | Age: 44
End: 2019-01-31
Payer: COMMERCIAL

## 2019-01-31 VITALS
SYSTOLIC BLOOD PRESSURE: 102 MMHG | TEMPERATURE: 97.5 F | HEIGHT: 62 IN | WEIGHT: 136.2 LBS | HEART RATE: 87 BPM | BODY MASS INDEX: 25.06 KG/M2 | DIASTOLIC BLOOD PRESSURE: 62 MMHG | OXYGEN SATURATION: 95 %

## 2019-01-31 DIAGNOSIS — F41.0 PANIC ATTACKS: ICD-10-CM

## 2019-01-31 DIAGNOSIS — F41.9 ANXIETY: Primary | ICD-10-CM

## 2019-01-31 PROCEDURE — G8427 DOCREV CUR MEDS BY ELIG CLIN: HCPCS | Performed by: NURSE PRACTITIONER

## 2019-01-31 PROCEDURE — 4004F PT TOBACCO SCREEN RCVD TLK: CPT | Performed by: NURSE PRACTITIONER

## 2019-01-31 PROCEDURE — G8420 CALC BMI NORM PARAMETERS: HCPCS | Performed by: NURSE PRACTITIONER

## 2019-01-31 PROCEDURE — G8482 FLU IMMUNIZE ORDER/ADMIN: HCPCS | Performed by: NURSE PRACTITIONER

## 2019-01-31 PROCEDURE — 99213 OFFICE O/P EST LOW 20 MIN: CPT | Performed by: NURSE PRACTITIONER

## 2019-01-31 RX ORDER — HYDROXYZINE PAMOATE 25 MG/1
25 CAPSULE ORAL 3 TIMES DAILY PRN
Qty: 30 CAPSULE | Refills: 1 | Status: SHIPPED | OUTPATIENT
Start: 2019-01-31 | End: 2019-02-10

## 2019-01-31 RX ORDER — BUSPIRONE HYDROCHLORIDE 5 MG/1
5 TABLET ORAL 3 TIMES DAILY
Qty: 90 TABLET | Refills: 2 | Status: SHIPPED | OUTPATIENT
Start: 2019-01-31 | End: 2019-05-01

## 2019-01-31 RX ORDER — HYDROXYZINE PAMOATE 25 MG/1
25 CAPSULE ORAL 3 TIMES DAILY PRN
COMMUNITY
End: 2019-01-31

## 2019-02-03 DIAGNOSIS — J45.40 MODERATE PERSISTENT ASTHMA, UNSPECIFIED WHETHER COMPLICATED: ICD-10-CM

## 2019-02-03 RX ORDER — DEXAMETHASONE 4 MG/1
TABLET ORAL
Qty: 12 G | Refills: 2 | Status: SHIPPED | OUTPATIENT
Start: 2019-02-03 | End: 2019-07-03 | Stop reason: SDUPTHER

## 2019-02-03 ASSESSMENT — ENCOUNTER SYMPTOMS
SHORTNESS OF BREATH: 0
COUGH: 0

## 2019-03-22 ENCOUNTER — HOSPITAL ENCOUNTER (EMERGENCY)
Age: 44
Discharge: HOME | End: 2019-03-22
Attending: EMERGENCY MEDICINE
Payer: COMMERCIAL

## 2019-03-22 ENCOUNTER — APPOINTMENT (OUTPATIENT)
Dept: CT IMAGING | Age: 44
End: 2019-03-22
Payer: COMMERCIAL

## 2019-03-22 VITALS
RESPIRATION RATE: 20 BRPM | WEIGHT: 130 LBS | SYSTOLIC BLOOD PRESSURE: 110 MMHG | TEMPERATURE: 97.9 F | BODY MASS INDEX: 23.92 KG/M2 | HEIGHT: 62 IN | DIASTOLIC BLOOD PRESSURE: 51 MMHG | OXYGEN SATURATION: 98 % | HEART RATE: 74 BPM

## 2019-03-22 DIAGNOSIS — R10.30 LOWER ABDOMINAL PAIN: Primary | ICD-10-CM

## 2019-03-22 LAB
ALBUMIN SERPL-MCNC: 4.3 G/DL (ref 3.5–4.6)
ALP BLD-CCNC: 51 U/L (ref 40–130)
ALT SERPL-CCNC: 9 U/L (ref 0–33)
AMYLASE: 20 U/L (ref 22–93)
ANION GAP SERPL CALCULATED.3IONS-SCNC: 14 MEQ/L (ref 9–15)
AST SERPL-CCNC: 18 U/L (ref 0–35)
BACTERIA: NORMAL /HPF
BASOPHILS ABSOLUTE: 0.2 K/UL (ref 0–0.2)
BASOPHILS RELATIVE PERCENT: 1.6 %
BILIRUB SERPL-MCNC: 0.6 MG/DL (ref 0.2–0.7)
BILIRUBIN URINE: NEGATIVE
BLOOD, URINE: ABNORMAL
BUN BLDV-MCNC: 8 MG/DL (ref 6–20)
CALCIUM SERPL-MCNC: 9.2 MG/DL (ref 8.5–9.9)
CHLORIDE BLD-SCNC: 102 MEQ/L (ref 95–107)
CLARITY: CLEAR
CO2: 23 MEQ/L (ref 20–31)
COLOR: YELLOW
CREAT SERPL-MCNC: 0.53 MG/DL (ref 0.5–0.9)
EOSINOPHILS ABSOLUTE: 0.1 K/UL (ref 0–0.7)
EOSINOPHILS RELATIVE PERCENT: 0.6 %
EPITHELIAL CELLS, UA: NORMAL /HPF
GFR AFRICAN AMERICAN: >60
GFR NON-AFRICAN AMERICAN: >60
GLOBULIN: 2.7 G/DL (ref 2.3–3.5)
GLUCOSE BLD-MCNC: 120 MG/DL (ref 70–99)
GLUCOSE URINE: NEGATIVE MG/DL
HCT VFR BLD CALC: 43.3 % (ref 37–47)
HEMOGLOBIN: 14.5 G/DL (ref 12–16)
KETONES, URINE: 40 MG/DL
LEUKOCYTE ESTERASE, URINE: NEGATIVE
LIPASE: 19 U/L (ref 12–95)
LYMPHOCYTES ABSOLUTE: 2 K/UL (ref 1–4.8)
LYMPHOCYTES RELATIVE PERCENT: 20 %
MCH RBC QN AUTO: 29.7 PG (ref 27–31.3)
MCHC RBC AUTO-ENTMCNC: 33.6 % (ref 33–37)
MCV RBC AUTO: 88.3 FL (ref 82–100)
MONOCYTES ABSOLUTE: 0.6 K/UL (ref 0.2–0.8)
MONOCYTES RELATIVE PERCENT: 6.5 %
NEUTROPHILS ABSOLUTE: 7 K/UL (ref 1.4–6.5)
NEUTROPHILS RELATIVE PERCENT: 71.3 %
NITRITE, URINE: NEGATIVE
PDW BLD-RTO: 13.8 % (ref 11.5–14.5)
PH UA: 7 (ref 5–9)
PLATELET # BLD: 220 K/UL (ref 130–400)
POTASSIUM SERPL-SCNC: 3.9 MEQ/L (ref 3.4–4.9)
PROTEIN UA: NEGATIVE MG/DL
RBC # BLD: 4.9 M/UL (ref 4.2–5.4)
RBC UA: NORMAL /HPF (ref 0–2)
SODIUM BLD-SCNC: 139 MEQ/L (ref 135–144)
SPECIFIC GRAVITY UA: 1.02 (ref 1–1.03)
TOTAL PROTEIN: 7 G/DL (ref 6.3–8)
URINE REFLEX TO CULTURE: YES
URINE TYPE: ABNORMAL
UROBILINOGEN, URINE: 0.2 E.U./DL
WBC # BLD: 9.8 K/UL (ref 4.8–10.8)
WBC UA: NORMAL /HPF (ref 0–5)

## 2019-03-22 PROCEDURE — 83690 ASSAY OF LIPASE: CPT

## 2019-03-22 PROCEDURE — 6360000004 HC RX CONTRAST MEDICATION: Performed by: EMERGENCY MEDICINE

## 2019-03-22 PROCEDURE — 99284 EMERGENCY DEPT VISIT MOD MDM: CPT

## 2019-03-22 PROCEDURE — 2500000003 HC RX 250 WO HCPCS: Performed by: EMERGENCY MEDICINE

## 2019-03-22 PROCEDURE — 82150 ASSAY OF AMYLASE: CPT

## 2019-03-22 PROCEDURE — 6360000002 HC RX W HCPCS: Performed by: EMERGENCY MEDICINE

## 2019-03-22 PROCEDURE — 74177 CT ABD & PELVIS W/CONTRAST: CPT

## 2019-03-22 PROCEDURE — 80053 COMPREHEN METABOLIC PANEL: CPT

## 2019-03-22 PROCEDURE — 2580000003 HC RX 258: Performed by: EMERGENCY MEDICINE

## 2019-03-22 PROCEDURE — 96374 THER/PROPH/DIAG INJ IV PUSH: CPT

## 2019-03-22 PROCEDURE — 96376 TX/PRO/DX INJ SAME DRUG ADON: CPT

## 2019-03-22 PROCEDURE — 96375 TX/PRO/DX INJ NEW DRUG ADDON: CPT

## 2019-03-22 PROCEDURE — 36415 COLL VENOUS BLD VENIPUNCTURE: CPT

## 2019-03-22 PROCEDURE — 81001 URINALYSIS AUTO W/SCOPE: CPT

## 2019-03-22 PROCEDURE — 85025 COMPLETE CBC W/AUTO DIFF WBC: CPT

## 2019-03-22 PROCEDURE — 87086 URINE CULTURE/COLONY COUNT: CPT

## 2019-03-22 RX ORDER — TRAMADOL HYDROCHLORIDE 50 MG/1
50 TABLET ORAL EVERY 4 HOURS PRN
Qty: 15 TABLET | Refills: 0 | Status: SHIPPED | OUTPATIENT
Start: 2019-03-22 | End: 2019-03-27

## 2019-03-22 RX ORDER — 0.9 % SODIUM CHLORIDE 0.9 %
1000 INTRAVENOUS SOLUTION INTRAVENOUS ONCE
Status: COMPLETED | OUTPATIENT
Start: 2019-03-22 | End: 2019-03-22

## 2019-03-22 RX ORDER — ONDANSETRON 2 MG/ML
4 INJECTION INTRAMUSCULAR; INTRAVENOUS ONCE
Status: COMPLETED | OUTPATIENT
Start: 2019-03-22 | End: 2019-03-22

## 2019-03-22 RX ORDER — ONDANSETRON 4 MG/1
4 TABLET, ORALLY DISINTEGRATING ORAL EVERY 8 HOURS PRN
Qty: 10 TABLET | Refills: 0 | Status: SHIPPED | OUTPATIENT
Start: 2019-03-22 | End: 2019-03-25 | Stop reason: SDUPTHER

## 2019-03-22 RX ORDER — SODIUM CHLORIDE 0.9 % (FLUSH) 0.9 %
3 SYRINGE (ML) INJECTION EVERY 8 HOURS
Status: DISCONTINUED | OUTPATIENT
Start: 2019-03-22 | End: 2019-03-22 | Stop reason: HOSPADM

## 2019-03-22 RX ORDER — FENTANYL CITRATE 50 UG/ML
75 INJECTION, SOLUTION INTRAMUSCULAR; INTRAVENOUS ONCE
Status: COMPLETED | OUTPATIENT
Start: 2019-03-22 | End: 2019-03-22

## 2019-03-22 RX ADMIN — SODIUM CHLORIDE 1000 ML: 9 INJECTION, SOLUTION INTRAVENOUS at 09:26

## 2019-03-22 RX ADMIN — IOPAMIDOL 100 ML: 755 INJECTION, SOLUTION INTRAVENOUS at 10:54

## 2019-03-22 RX ADMIN — FENTANYL CITRATE 75 MCG: 50 INJECTION INTRAMUSCULAR; INTRAVENOUS at 09:26

## 2019-03-22 RX ADMIN — ONDANSETRON 4 MG: 2 INJECTION INTRAMUSCULAR; INTRAVENOUS at 09:26

## 2019-03-22 RX ADMIN — Medication 3 ML: at 09:27

## 2019-03-22 RX ADMIN — ONDANSETRON 4 MG: 2 INJECTION INTRAMUSCULAR; INTRAVENOUS at 10:32

## 2019-03-22 RX ADMIN — BARIUM SULFATE 450 ML: 20 SUSPENSION ORAL at 10:01

## 2019-03-22 ASSESSMENT — ENCOUNTER SYMPTOMS
VOMITING: 0
CHEST TIGHTNESS: 0
BACK PAIN: 0
CHOKING: 0
EYE DISCHARGE: 0
SINUS PRESSURE: 0
COUGH: 1
ABDOMINAL PAIN: 1
TROUBLE SWALLOWING: 0
EYE PAIN: 0
FACIAL SWELLING: 0
BLOOD IN STOOL: 0
VOICE CHANGE: 0
SHORTNESS OF BREATH: 0
DIARRHEA: 0
EYE REDNESS: 0
CONSTIPATION: 0
SORE THROAT: 0
STRIDOR: 0
WHEEZING: 1

## 2019-03-22 ASSESSMENT — PAIN SCALES - GENERAL
PAINLEVEL_OUTOF10: 2
PAINLEVEL_OUTOF10: 5

## 2019-03-22 ASSESSMENT — PAIN DESCRIPTION - LOCATION
LOCATION: ABDOMEN
LOCATION: ABDOMEN

## 2019-03-22 ASSESSMENT — PAIN DESCRIPTION - PAIN TYPE: TYPE: ACUTE PAIN

## 2019-03-22 ASSESSMENT — PAIN DESCRIPTION - FREQUENCY: FREQUENCY: CONTINUOUS

## 2019-03-24 LAB — URINE CULTURE, ROUTINE: NORMAL

## 2019-03-25 ENCOUNTER — OFFICE VISIT (OUTPATIENT)
Dept: FAMILY MEDICINE CLINIC | Age: 44
End: 2019-03-25
Payer: COMMERCIAL

## 2019-03-25 VITALS
DIASTOLIC BLOOD PRESSURE: 58 MMHG | SYSTOLIC BLOOD PRESSURE: 90 MMHG | BODY MASS INDEX: 25.06 KG/M2 | HEART RATE: 75 BPM | WEIGHT: 136.2 LBS | TEMPERATURE: 98 F | OXYGEN SATURATION: 99 % | HEIGHT: 62 IN

## 2019-03-25 DIAGNOSIS — N83.209 RUPTURED OVARIAN CYST: Primary | ICD-10-CM

## 2019-03-25 PROCEDURE — G8427 DOCREV CUR MEDS BY ELIG CLIN: HCPCS | Performed by: NURSE PRACTITIONER

## 2019-03-25 PROCEDURE — 99213 OFFICE O/P EST LOW 20 MIN: CPT | Performed by: NURSE PRACTITIONER

## 2019-03-25 PROCEDURE — G8420 CALC BMI NORM PARAMETERS: HCPCS | Performed by: NURSE PRACTITIONER

## 2019-03-25 PROCEDURE — G8482 FLU IMMUNIZE ORDER/ADMIN: HCPCS | Performed by: NURSE PRACTITIONER

## 2019-03-25 PROCEDURE — 4004F PT TOBACCO SCREEN RCVD TLK: CPT | Performed by: NURSE PRACTITIONER

## 2019-03-25 ASSESSMENT — ENCOUNTER SYMPTOMS
SHORTNESS OF BREATH: 0
COUGH: 0
ABDOMINAL PAIN: 1

## 2019-04-19 ENCOUNTER — OFFICE VISIT (OUTPATIENT)
Dept: OBGYN CLINIC | Age: 44
End: 2019-04-19
Payer: COMMERCIAL

## 2019-04-19 ENCOUNTER — TELEPHONE (OUTPATIENT)
Dept: OBGYN CLINIC | Age: 44
End: 2019-04-19

## 2019-04-19 VITALS
HEIGHT: 62 IN | DIASTOLIC BLOOD PRESSURE: 62 MMHG | BODY MASS INDEX: 24 KG/M2 | WEIGHT: 130.4 LBS | SYSTOLIC BLOOD PRESSURE: 94 MMHG

## 2019-04-19 DIAGNOSIS — N83.209 CYST OF OVARY, UNSPECIFIED LATERALITY: Primary | ICD-10-CM

## 2019-04-19 PROCEDURE — G8420 CALC BMI NORM PARAMETERS: HCPCS | Performed by: OBSTETRICS & GYNECOLOGY

## 2019-04-19 PROCEDURE — 99203 OFFICE O/P NEW LOW 30 MIN: CPT | Performed by: OBSTETRICS & GYNECOLOGY

## 2019-04-19 PROCEDURE — 4004F PT TOBACCO SCREEN RCVD TLK: CPT | Performed by: OBSTETRICS & GYNECOLOGY

## 2019-04-19 PROCEDURE — G8427 DOCREV CUR MEDS BY ELIG CLIN: HCPCS | Performed by: OBSTETRICS & GYNECOLOGY

## 2019-04-19 ASSESSMENT — ENCOUNTER SYMPTOMS
RESPIRATORY NEGATIVE: 1
ALLERGIC/IMMUNOLOGIC NEGATIVE: 1
ABDOMINAL PAIN: 1
BLOOD IN STOOL: 0
DIARRHEA: 0
ANAL BLEEDING: 0
RECTAL PAIN: 0
VOMITING: 0
CONSTIPATION: 0
NAUSEA: 0
ABDOMINAL DISTENTION: 0
EYES NEGATIVE: 1

## 2019-04-19 NOTE — PROGRESS NOTES
Patient here to discuss CT done for lower abdominal pain. New patient; reviewed medical, surgical, social and family history. Also reviewed current medications and allergies. Patient states pain is now resolved. Told she could have had a ruptured ovarian cyst secondary to free fluid in pelvis on CT. Ovaries no identified. Discussed F/U with pelvic US for better assessment of ovaries. S/P JUAN CARLOS in past for fibroids, endometriosis and pain. F/U US results and annual exam.  All questions answered. Pt was seen with total face to face time of 30 minutes with more than 50% of the visit being counseling and education regarding encounter dx of pelvic fluid and h/o lower abdominal pain. See discussion /counseling details as stated above.              Vitals:  BP 94/62   Ht 5' 2\" (1.575 m)   Wt 130 lb 6.4 oz (59.1 kg)   LMP  (LMP Unknown)   BMI 23.85 kg/m²   Past Medical History:   Diagnosis Date    Anxiety     Asthma     Attempted [facility] ([facility] [address]     Depression     Diverticulosis     Erosive gastritis 6/1/2017    Fatty liver     Fatty pancreas     Fibromyalgia     Fibromyalgia 11/28/2017    Gastritis     Internal hemorrhoids 6/1/2017    Irritable bowel syndrome     Osteoarthritis     Pancreatic lesion     Pancreatic mass, 7mm 6/1/2017    Pancreatic mass, 7mm, Dr Emelia Nicolas 11/28/2017    Past heart attack     Tobacco abuse 6/1/2017     Past Surgical History:   Procedure Laterality Date    BREAST LUMPECTOMY Bilateral     CHOLECYSTECTOMY      COLONOSCOPY      ENDOSCOPIC ULTRASOUND (LOWER) N/A 10/26/2017    [address] performed [address] Moises Cross MD at [address] ( adhesiolysis; fibroids, endometriosis )    HYSTERECTOMY, TOTAL ABDOMINAL      one and 1/2 ovaries are left    DE ESOPHAGOGASTRODUODENOSCOPY TRANSORAL DIAGNOSTIC N/A 10/26/2017    EGD ESOPHAGOGASTRODUODENOSCOPY performed by Moises Cross MD at [address] EXTRACTION       Allergies:  Asa [aspirin]; Demerol hcl [meperidine]; Mobic [meloxicam]; Morphine; Nsaids; Other; Xanax [alprazolam]; Zoloft [sertraline hcl]; and Trintellix [vortioxetine]  Current Outpatient Medications   Medication Sig Dispense Refill    FLOVENT  MCG/ACT inhaler INHALE 1 (ONE) puff BY MOUTH TWICE DAILY 12 g 2    busPIRone (BUSPAR) 5 MG tablet Take 1 tablet by mouth 3 times daily 90 tablet 2    tiZANidine (ZANAFLEX) 4 MG tablet Take 1 tablet by mouth every 8 hours as needed (muscle spasms) 300 tablet 0    ondansetron (ZOFRAN-ODT) 4 MG disintegrating tablet dissolve 1 tablet in mouth every 8 hours as needed for Nausea or Vomiting 30 tablet 2    levocetirizine (XYZAL) 5 MG tablet Take 1 tablet by mouth nightly 30 tablet 3    montelukast (SINGULAIR) 10 MG tablet Take 1 tablet by mouth nightly 30 tablet 3    famotidine (PEPCID) 20 MG tablet Take 1 tablet by mouth 2 times daily 60 tablet 5    naphazoline-pheniramine (NAPHCON-A) 0.025-0.3 % ophthalmic solution 1 drop 4 times daily      Caffeine (VIVARIN) 200 MG TABS Take 200 mg by mouth 2 times daily      pantoprazole (PROTONIX) 40 MG tablet TAKE 1 TABLET BY MOUTH EVERY DAY 90 tablet 3     No current facility-administered medications for this visit.       Social History     Socioeconomic History    Marital status: Single     Spouse name: Not on file    Number of children: Not on file    Years of education: Not on file    Highest education level: Not on file   Occupational History    Not on file   Social Needs    Financial resource strain: Not on file    Food insecurity:     Worry: Not on file     Inability: Not on file    Transportation needs:     Medical: Not on file     Non-medical: Not on file   Tobacco Use    Smoking status: Current Every Day Smoker     Packs/day: 0.50     Years: 30.00     Pack years: 15.00     Types: Cigarettes    Smokeless tobacco: Never Used    Tobacco comment: pt cutting back, 1  pck lasting 3 days
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
## 2019-04-28 ENCOUNTER — HOSPITAL ENCOUNTER (OUTPATIENT)
Dept: ULTRASOUND IMAGING | Age: 44
Discharge: HOME | End: 2019-04-30
Payer: COMMERCIAL

## 2019-04-28 DIAGNOSIS — N83.209 CYST OF OVARY, UNSPECIFIED LATERALITY: ICD-10-CM

## 2019-04-28 PROCEDURE — 76830 TRANSVAGINAL US NON-OB: CPT

## 2019-04-28 PROCEDURE — 76856 US EXAM PELVIC COMPLETE: CPT

## 2019-06-04 ENCOUNTER — APPOINTMENT (OUTPATIENT)
Dept: GENERAL RADIOLOGY | Age: 44
End: 2019-06-04
Payer: COMMERCIAL

## 2019-06-04 ENCOUNTER — HOSPITAL ENCOUNTER (EMERGENCY)
Age: 44
Discharge: HOME | End: 2019-06-04
Attending: EMERGENCY MEDICINE
Payer: COMMERCIAL

## 2019-06-04 VITALS
HEART RATE: 75 BPM | SYSTOLIC BLOOD PRESSURE: 85 MMHG | BODY MASS INDEX: 22.2 KG/M2 | RESPIRATION RATE: 18 BRPM | WEIGHT: 130 LBS | DIASTOLIC BLOOD PRESSURE: 55 MMHG | HEIGHT: 64 IN | OXYGEN SATURATION: 98 % | TEMPERATURE: 97.9 F

## 2019-06-04 DIAGNOSIS — M62.838 TRAPEZIUS MUSCLE SPASM: Primary | ICD-10-CM

## 2019-06-04 DIAGNOSIS — S16.1XXA STRAIN OF NECK MUSCLE, INITIAL ENCOUNTER: ICD-10-CM

## 2019-06-04 LAB
ALBUMIN SERPL-MCNC: 4.2 G/DL (ref 3.5–4.6)
ALP BLD-CCNC: 41 U/L (ref 40–130)
ALT SERPL-CCNC: 8 U/L (ref 0–33)
ANION GAP SERPL CALCULATED.3IONS-SCNC: 10 MEQ/L (ref 9–15)
AST SERPL-CCNC: 15 U/L (ref 0–35)
BASOPHILS ABSOLUTE: 0.1 K/UL (ref 0–0.2)
BASOPHILS RELATIVE PERCENT: 1.1 %
BILIRUB SERPL-MCNC: 0.3 MG/DL (ref 0.2–0.7)
BUN BLDV-MCNC: 8 MG/DL (ref 6–20)
CALCIUM SERPL-MCNC: 9.3 MG/DL (ref 8.5–9.9)
CHLORIDE BLD-SCNC: 104 MEQ/L (ref 95–107)
CO2: 26 MEQ/L (ref 20–31)
CREAT SERPL-MCNC: 0.55 MG/DL (ref 0.5–0.9)
EOSINOPHILS ABSOLUTE: 0.1 K/UL (ref 0–0.7)
EOSINOPHILS RELATIVE PERCENT: 0.9 %
GFR AFRICAN AMERICAN: >60
GFR NON-AFRICAN AMERICAN: >60
GLOBULIN: 2.2 G/DL (ref 2.3–3.5)
GLUCOSE BLD-MCNC: 87 MG/DL (ref 70–99)
HCT VFR BLD CALC: 40 % (ref 37–47)
HEMOGLOBIN: 14.1 G/DL (ref 12–16)
LYMPHOCYTES ABSOLUTE: 1.8 K/UL (ref 1–4.8)
LYMPHOCYTES RELATIVE PERCENT: 26.8 %
MCH RBC QN AUTO: 31.5 PG (ref 27–31.3)
MCHC RBC AUTO-ENTMCNC: 35.3 % (ref 33–37)
MCV RBC AUTO: 89.3 FL (ref 82–100)
MONOCYTES ABSOLUTE: 0.5 K/UL (ref 0.2–0.8)
MONOCYTES RELATIVE PERCENT: 7.7 %
NEUTROPHILS ABSOLUTE: 4.2 K/UL (ref 1.4–6.5)
NEUTROPHILS RELATIVE PERCENT: 63.5 %
PDW BLD-RTO: 14.5 % (ref 11.5–14.5)
PLATELET # BLD: 205 K/UL (ref 130–400)
POTASSIUM SERPL-SCNC: 3.9 MEQ/L (ref 3.4–4.9)
RBC # BLD: 4.47 M/UL (ref 4.2–5.4)
SODIUM BLD-SCNC: 140 MEQ/L (ref 135–144)
TOTAL PROTEIN: 6.4 G/DL (ref 6.3–8)
WBC # BLD: 6.6 K/UL (ref 4.8–10.8)

## 2019-06-04 PROCEDURE — 73030 X-RAY EXAM OF SHOULDER: CPT

## 2019-06-04 PROCEDURE — 96375 TX/PRO/DX INJ NEW DRUG ADDON: CPT

## 2019-06-04 PROCEDURE — 85025 COMPLETE CBC W/AUTO DIFF WBC: CPT

## 2019-06-04 PROCEDURE — 99283 EMERGENCY DEPT VISIT LOW MDM: CPT

## 2019-06-04 PROCEDURE — 2580000003 HC RX 258: Performed by: EMERGENCY MEDICINE

## 2019-06-04 PROCEDURE — 96374 THER/PROPH/DIAG INJ IV PUSH: CPT

## 2019-06-04 PROCEDURE — 2500000003 HC RX 250 WO HCPCS: Performed by: EMERGENCY MEDICINE

## 2019-06-04 PROCEDURE — 6360000002 HC RX W HCPCS: Performed by: EMERGENCY MEDICINE

## 2019-06-04 PROCEDURE — 80053 COMPREHEN METABOLIC PANEL: CPT

## 2019-06-04 PROCEDURE — 36415 COLL VENOUS BLD VENIPUNCTURE: CPT

## 2019-06-04 PROCEDURE — 72050 X-RAY EXAM NECK SPINE 4/5VWS: CPT

## 2019-06-04 RX ORDER — CYCLOBENZAPRINE HCL 10 MG
10 TABLET ORAL NIGHTLY PRN
Qty: 30 TABLET | Refills: 0 | Status: SHIPPED | OUTPATIENT
Start: 2019-06-04 | End: 2019-06-14

## 2019-06-04 RX ORDER — LORAZEPAM 2 MG/ML
1 INJECTION INTRAMUSCULAR ONCE
Status: COMPLETED | OUTPATIENT
Start: 2019-06-04 | End: 2019-06-04

## 2019-06-04 RX ORDER — OXYCODONE HYDROCHLORIDE AND ACETAMINOPHEN 5; 325 MG/1; MG/1
1 TABLET ORAL EVERY 6 HOURS PRN
Qty: 12 TABLET | Refills: 0 | Status: SHIPPED | OUTPATIENT
Start: 2019-06-04 | End: 2019-06-07

## 2019-06-04 RX ORDER — KETOROLAC TROMETHAMINE 30 MG/ML
30 INJECTION, SOLUTION INTRAMUSCULAR; INTRAVENOUS ONCE
Status: COMPLETED | OUTPATIENT
Start: 2019-06-04 | End: 2019-06-04

## 2019-06-04 RX ORDER — ONDANSETRON 2 MG/ML
4 INJECTION INTRAMUSCULAR; INTRAVENOUS ONCE
Status: COMPLETED | OUTPATIENT
Start: 2019-06-04 | End: 2019-06-04

## 2019-06-04 RX ORDER — 0.9 % SODIUM CHLORIDE 0.9 %
1000 INTRAVENOUS SOLUTION INTRAVENOUS ONCE
Status: COMPLETED | OUTPATIENT
Start: 2019-06-04 | End: 2019-06-04

## 2019-06-04 RX ADMIN — KETOROLAC TROMETHAMINE 30 MG: 30 INJECTION, SOLUTION INTRAMUSCULAR; INTRAVENOUS at 08:52

## 2019-06-04 RX ADMIN — FAMOTIDINE 20 MG: 10 INJECTION, SOLUTION INTRAVENOUS at 08:52

## 2019-06-04 RX ADMIN — SODIUM CHLORIDE 1000 ML: 9 INJECTION, SOLUTION INTRAVENOUS at 08:51

## 2019-06-04 RX ADMIN — ONDANSETRON 4 MG: 2 INJECTION INTRAMUSCULAR; INTRAVENOUS at 08:53

## 2019-06-04 RX ADMIN — LORAZEPAM 1 MG: 2 INJECTION INTRAMUSCULAR; INTRAVENOUS at 08:52

## 2019-06-04 ASSESSMENT — PAIN SCALES - GENERAL
PAINLEVEL_OUTOF10: 10
PAINLEVEL_OUTOF10: 10

## 2019-06-04 ASSESSMENT — PAIN DESCRIPTION - FREQUENCY: FREQUENCY: INTERMITTENT

## 2019-06-04 ASSESSMENT — ENCOUNTER SYMPTOMS
SHORTNESS OF BREATH: 0
VOMITING: 0
COUGH: 0
ABDOMINAL PAIN: 0
BACK PAIN: 0
NAUSEA: 0
DIARRHEA: 0
SORE THROAT: 0

## 2019-06-04 ASSESSMENT — PAIN DESCRIPTION - ORIENTATION: ORIENTATION: LEFT

## 2019-06-04 ASSESSMENT — PAIN DESCRIPTION - PROGRESSION: CLINICAL_PROGRESSION: GRADUALLY WORSENING

## 2019-06-04 ASSESSMENT — PAIN DESCRIPTION - ONSET: ONSET: ON-GOING

## 2019-06-04 ASSESSMENT — PAIN DESCRIPTION - DESCRIPTORS: DESCRIPTORS: ACHING

## 2019-06-04 ASSESSMENT — PAIN DESCRIPTION - PAIN TYPE: TYPE: ACUTE PAIN

## 2019-06-04 ASSESSMENT — PAIN DESCRIPTION - LOCATION: LOCATION: NECK;SHOULDER

## 2019-06-04 NOTE — DISCHARGE INSTR - COC
Continuity of Care Form    Patient Name: Zainab Monahan   :  1975  MRN:  [id number]    Admit date:  2019  Discharge date:  ***    Code Status Order: No Order   Advance Directives:     Admitting Physician:  No admitting provider for patient encounter.   PCP: VINNIE Sellers CNP    Discharging Nurse: [facility] Unit/Room#:   Discharging Unit Phone Number: ***    Emergency Contact:   Extended Emergency Contact Information  Primary Emergency Contact: [facility] [address] Phone: [phone]  Relation: Parent  Secondary Emergency Contact: Liam Boo(Cornel)   [facility] [address] Phone: [phone]  Relation: Child    Past Surgical History:  Past Surgical History:   Procedure Laterality Date    BREAST LUMPECTOMY Bilateral     CHOLECYSTECTOMY      COLONOSCOPY      ENDOSCOPIC ULTRASOUND (LOWER) N/A 10/26/2017    EUS performed by Alec Kc MD at [address] ( adhesiolysis; fibroids, endometriosis )    HYSTERECTOMY, TOTAL ABDOMINAL      one and 1/2 ovaries are left    AL ESOPHAGOGASTRODUODENOSCOPY TRANSORAL DIAGNOSTIC N/A 10/26/2017    EGD ESOPHAGOGASTRODUODENOSCOPY performed by Alec Kc MD at [facility] [address] ENDOSCOPY      WISDOM TOOTH EXTRACTION         Immunization History:   Immunization History   Administered Date(s) Administered    Influenza, Nate Rincon, 3 Years and older, IM (Fluzone 3 yrs and older or Afluria 5 yrs and older) 2017, 09/10/2018       Active Problems:  Patient Active Problem List   Diagnosis Code    Fatty liver K76.0    Tobacco abuse Z72.0    Internal hemorrhoids K64.8    Erosive gastritis K29.60    Irritable bowel syndrome K58.9    Nausea R11.0    Greater trochanteric bursitis of right hip M70.61    Right sided sciatica M54.31    Anxiety F41.9    Pancreatic mass, 7mm, Dr Vickey Serrano K86.9    Fibromyalgia M79.7    Nonintractable epilepsy without status epilepticus (HCC) G40.909    Gastroesophageal reflux disease without esophagitis K21.9    Depression F32.9    Moderate persistent asthma J45.40    Cervical spondylosis without myelopathy M47.812    Pain of both hip joints M25.551, M25.552    Myofascial pain syndrome M79.18    Lumbar spondylosis M47.816       Isolation/Infection:   Isolation          No Isolation            Nurse Assessment:  Last Vital Signs: BP (!) 85/55   Pulse 75   Temp 97.9 °F (36.6 °C) (Oral)   Resp 18   Ht 5' 4\" (1.626 m)   Wt 130 lb (59 kg)   LMP  (LMP Unknown)   SpO2 98%   BMI 22.31 kg/m²     Last documented pain score (0-10 scale): Pain Level: 10  Last Weight:   Wt Readings from Last 1 Encounters:   06/04/19 130 lb (59 kg)     Mental Status:  {IP PT MENTAL STATUS:20030}    IV Access:  { JAMIL IV ACCESS:647540489}    Nursing Mobility/ADLs:  Walking   {[facility] DME VOQD:246858576}  Transfer  {[facility] DME JXGT:543263795}  Bathing  {[facility] DME GDRS:590637624}  Dressing  {[facility] DME ASUR:631374232}  Toileting  {[facility] DME EEBT:061864643}  Feeding  {[facility] DME ISSW:134424514}  Med Admin  {[facility] DME TRHU:689389852}  Med Delivery   { JAMIL MED Delivery:412554932}    Wound Care Documentation and Therapy:        Elimination:  Continence:   · Bowel: {YES / IX:45064}  · Bladder: {YES / WS:68336}  Urinary Catheter: {Urinary Catheter:318015836}   Colostomy/Ileostomy/Ileal Conduit: {YES / NK:15226}       Date of Last BM: ***    Intake/Output Summary (Last 24 hours) at 6/4/2019 1050  Last data filed at 6/4/2019 1022  Gross per 24 hour   Intake 1000 ml   Output --   Net 1000 ml     No intake/output data recorded.     Safety Concerns:     [facility] Safety Concerns:747975336}    Impairments/Disabilities:      [facility] Impairments/Disabilities:699636371}    Nutrition Therapy:  Current Nutrition Therapy:   [facility] Diet List:072068490}    Routes of Feeding: {CHP DME Other Feedings:592937698}  Liquids: {Slp liquid thickness:66528}  Daily Fluid Restriction: {CHP DME Yes amt example:554739328}  Last Modified Barium Swallow with Video (Video Swallowing Test): {Done Not Done RBQP:153946085}    Treatments at the Time of Hospital Discharge:   Respiratory Treatments: ***  Oxygen Therapy:  {Therapy; copd oxygen:87079}  Ventilator:    {[facility] Vent LRAL:190783908}    Rehab Therapies: {THERAPEUTIC INTERVENTION:9171765580}  Weight Bearing Status/Restrictions: {[facility] Weight Bearin}  Other Medical Equipment (for information only, NOT a DME order):  {EQUIPMENT:119185348}  Other Treatments: ***    Patient's personal belongings (please select all that are sent with patient):  {[facility] DME Belongings:947194352}    RN SIGNATURE:  {Esignature:824755421}    CASE MANAGEMENT/SOCIAL WORK SECTION    Inpatient Status Date: ***    Readmission Risk Assessment Score:  Readmission Risk              Risk of Unplanned Readmission:        0           Discharging to Facility/ Agency   · Name:   · Address:  · Phone:  · Fax:    Dialysis Facility (if applicable)   · Name:  · Address:  · Dialysis Schedule:  · Phone:  · Fax:    / signature: {Esignature:991746033}    PHYSICIAN SECTION    Prognosis: {Prognosis:1953352778}    Condition at Discharge: [address] [place] [address] Patient Condition:488212538}    Rehab Potential (if transferring to Rehab): {Prognosis:1690049318}    Recommended Labs or Other Treatments After Discharge: ***    Physician Certification: I certify the above information and transfer of Nickolas Reed  is necessary for the continuing treatment of the diagnosis listed and that she requires {Admit to Appropriate Level of Care:31774} for {GREATER/LESS:933574558} 30 days.      Update Admission H&P: {P DME Changes in RYNZS:023204665}    PHYSICIAN SIGNATURE:  {Esignature:827071587}

## 2019-06-04 NOTE — ED TRIAGE NOTES
Pt is a+o x4 msps intact lungs clear bilat skin warm pink and dry. Pt states she was walking her large dog 2 days prior and came home now is unable to turn neck or lift left arm without main. Pt states pain is mostly on upper left side of back and neck. Pt appears in no distress at this time.

## 2019-06-04 NOTE — ED PROVIDER NOTES
[address] ED  eMERGENCYdEPARTMENT eNCOUnter      Pt Name: Ace Trevinor  MRN: [id number]  Rosemary 1975  Date of evaluation: 6/4/2019  Stanford Libman, MD    CHIEF COMPLAINT           HPI  cAe Officer is a 37 y.o. female per chart review has a h/o epilepsy, fibromyalgia, cervical spondylosis who presents to the ED with midline, moderate, constant, stiff neck pain with radiation to left trapezius and shoulder x 2 days after her dog began running while she was walking him, hyperextending her shoulder. Notes she heard an associated popping sound at the time of the injury. + for associated left arm tingling, neck stiffness and decreased ROM in the morning that improves as the day continues. ROS  Review of Systems   Constitutional: Negative for activity change, chills and fever. HENT: Negative for ear pain and sore throat. Eyes: Negative for visual disturbance. Respiratory: Negative for cough and shortness of breath. Cardiovascular: Negative for chest pain, palpitations and leg swelling. Gastrointestinal: Negative for abdominal pain, diarrhea, nausea and vomiting. Genitourinary: Negative for dysuria. Musculoskeletal: Positive for neck pain and neck stiffness. Negative for back pain. Skin: Negative for rash. Neurological: Positive for headaches. Negative for dizziness and weakness. Positive for left arm tingling       Except as noted above the remainder of the review of systems was reviewed and negative.        PAST MEDICAL HISTORY     Past Medical History:   Diagnosis Date    Anxiety     Asthma     Attempted [facility] ([facility] [address]     Depression     Diverticulosis     Erosive gastritis 6/1/2017    Fatty liver     Fatty pancreas     Fibromyalgia     Fibromyalgia 11/28/2017    Gastritis     Internal hemorrhoids 6/1/2017    Irritable bowel syndrome     Osteoarthritis     Pancreatic lesion     Pancreatic mass, 7mm 6/1/2017    Pancreatic mass, 7mm, Dr Johanna Cabrales Maternal Grandmother     Other Maternal Grandmother         copd,emphysema    High Blood Pressure Sister     Diabetes Paternal Grandfather     Breast Cancer Neg Hx     Colon Cancer Neg Hx     Heart Attack Neg Hx     Heart Disease Neg Hx     Prostate Cancer Neg Hx     Stroke Neg Hx           SOCIAL HISTORY       Social History     Socioeconomic History    Marital status: Single     Spouse name: None    Number of children: None    Years of education: None    Highest education level: None   Occupational History    None   Social Needs    Financial resource strain: None    Food insecurity:     Worry: None     Inability: None    Transportation needs:     Medical: None     Non-medical: None   Tobacco Use    Smoking status: Current Every Day Smoker     Packs/day: 0.50     Years: 30.00     Pack years: 15.00     Types: Cigarettes    Smokeless tobacco: Never Used    Tobacco comment: pt cutting back, 1  pck lasting 3 days    Substance and Sexual Activity    Alcohol use:  Yes     Alcohol/week: 1.2 oz     Types: 2 Cans of beer per week    Drug use: Yes     Types: Marijuana    Sexual activity: Yes     Partners: Male   Lifestyle    Physical activity:     Days per week: None     Minutes per session: None    Stress: None   Relationships    Social connections:     Talks on phone: None     Gets together: None     Attends Shinto service: None     Active member of club or organization: None     Attends meetings of clubs or organizations: None     Relationship status: None    Intimate partner violence:     Fear of current or ex partner: None     Emotionally abused: None     Physically abused: None     Forced sexual activity: None   Other Topics Concern    None   Social History Narrative    None         PHYSICAL EXAM       ED Triage Vitals [06/04/19 0754]   BP Temp Temp Source Pulse Resp SpO2 Height Weight   98/62 97.9 °F (36.6 °C) Oral 108 20 100 % 5' 4\" (1.626 m) 130 lb (59 kg)       Physical Exam

## 2019-06-04 NOTE — ED TRIAGE NOTES
Patient arrived from home via self with complaint of neck pain and left arm pain after walking dog yesterday. Patient A&OX3. Skin pink, warm, and dry. Patient unable to move head side to side.

## 2019-07-03 ENCOUNTER — OFFICE VISIT (OUTPATIENT)
Dept: FAMILY MEDICINE CLINIC | Age: 44
End: 2019-07-03
Payer: COMMERCIAL

## 2019-07-03 VITALS
SYSTOLIC BLOOD PRESSURE: 102 MMHG | HEART RATE: 99 BPM | TEMPERATURE: 97.8 F | OXYGEN SATURATION: 99 % | DIASTOLIC BLOOD PRESSURE: 60 MMHG | BODY MASS INDEX: 23.48 KG/M2 | WEIGHT: 127.6 LBS | HEIGHT: 62 IN

## 2019-07-03 DIAGNOSIS — M79.7 FIBROMYALGIA: ICD-10-CM

## 2019-07-03 DIAGNOSIS — K21.9 GASTROESOPHAGEAL REFLUX DISEASE WITHOUT ESOPHAGITIS: ICD-10-CM

## 2019-07-03 DIAGNOSIS — M62.838 MUSCLE SPASMS OF NECK: ICD-10-CM

## 2019-07-03 DIAGNOSIS — J30.9 ACUTE ALLERGIC RHINITIS: ICD-10-CM

## 2019-07-03 DIAGNOSIS — J34.89 SINUS PRESSURE: Primary | ICD-10-CM

## 2019-07-03 DIAGNOSIS — R53.82 CHRONIC FATIGUE: ICD-10-CM

## 2019-07-03 DIAGNOSIS — R11.0 NAUSEA: ICD-10-CM

## 2019-07-03 DIAGNOSIS — J45.40 MODERATE PERSISTENT ASTHMA, UNSPECIFIED WHETHER COMPLICATED: ICD-10-CM

## 2019-07-03 PROCEDURE — G8427 DOCREV CUR MEDS BY ELIG CLIN: HCPCS | Performed by: NURSE PRACTITIONER

## 2019-07-03 PROCEDURE — G8420 CALC BMI NORM PARAMETERS: HCPCS | Performed by: NURSE PRACTITIONER

## 2019-07-03 PROCEDURE — 99214 OFFICE O/P EST MOD 30 MIN: CPT | Performed by: NURSE PRACTITIONER

## 2019-07-03 PROCEDURE — 4004F PT TOBACCO SCREEN RCVD TLK: CPT | Performed by: NURSE PRACTITIONER

## 2019-07-03 RX ORDER — TIZANIDINE 4 MG/1
4 TABLET ORAL EVERY 8 HOURS PRN
Qty: 30 TABLET | OUTPATIENT
Start: 2019-07-03

## 2019-07-03 RX ORDER — MONTELUKAST SODIUM 10 MG/1
10 TABLET ORAL NIGHTLY
Qty: 30 TABLET | Refills: 3 | Status: SHIPPED | OUTPATIENT
Start: 2019-07-03 | End: 2019-09-26 | Stop reason: SDUPTHER

## 2019-07-03 RX ORDER — PSEUDOEPHEDRINE HCL 60 MG/1
60 TABLET ORAL EVERY 6 HOURS PRN
Qty: 20 TABLET | Refills: 0 | Status: SHIPPED | OUTPATIENT
Start: 2019-07-03 | End: 2019-07-06 | Stop reason: SDUPTHER

## 2019-07-03 RX ORDER — FLUTICASONE PROPIONATE 110 UG/1
AEROSOL, METERED RESPIRATORY (INHALATION)
Qty: 12 G | Refills: 2 | Status: SHIPPED | OUTPATIENT
Start: 2019-07-03 | End: 2019-12-30

## 2019-07-03 RX ORDER — LEVOCETIRIZINE DIHYDROCHLORIDE 5 MG/1
5 TABLET, FILM COATED ORAL NIGHTLY
Qty: 30 TABLET | Refills: 3 | Status: SHIPPED | OUTPATIENT
Start: 2019-07-03

## 2019-07-03 RX ORDER — TIZANIDINE 4 MG/1
4 TABLET ORAL EVERY 8 HOURS PRN
Qty: 300 TABLET | Refills: 0 | Status: SHIPPED | OUTPATIENT
Start: 2019-07-03 | End: 2019-07-30 | Stop reason: SDUPTHER

## 2019-07-03 RX ORDER — ONDANSETRON 4 MG/1
TABLET, ORALLY DISINTEGRATING ORAL
Qty: 30 TABLET | Refills: 2 | Status: SHIPPED | OUTPATIENT
Start: 2019-07-03 | End: 2019-09-02 | Stop reason: SDUPTHER

## 2019-07-03 RX ORDER — FAMOTIDINE 20 MG/1
20 TABLET, FILM COATED ORAL 2 TIMES DAILY
Qty: 60 TABLET | Refills: 5 | Status: ON HOLD | OUTPATIENT
Start: 2019-07-03 | End: 2021-02-03

## 2019-07-03 ASSESSMENT — ENCOUNTER SYMPTOMS
SINUS PAIN: 1
SHORTNESS OF BREATH: 0
COUGH: 1

## 2019-07-03 NOTE — PROGRESS NOTES
(See Comments)     bleeding    Morphine     Nsaids     Other      All oral narcotics, can take. Causes severe constipation. Worse then normal     Xanax [Alprazolam]      \"left side of her body went numb\"    Zoloft [Sertraline Hcl] Other (See Comments)     Migraine trigger    Trintellix [Vortioxetine] Nausea And Vomiting       Review of Systems   Constitutional: Positive for fatigue. HENT: Positive for congestion, ear pain and sinus pain. Respiratory: Positive for cough. Negative for shortness of breath. Cardiovascular: Negative for chest pain. Objective  Vitals:    07/03/19 0903   BP: 102/60   Pulse: 99   Temp: 97.8 °F (36.6 °C)   SpO2: 99%   Weight: 127 lb 9.6 oz (57.9 kg)   Height: 5' 2\" (1.575 m)     Physical Exam   Constitutional: She is oriented to person, place, and time. She appears well-developed and well-nourished. HENT:   Head: Normocephalic. Right Ear: External ear normal. A middle ear effusion is present. Left Ear: External ear normal. A middle ear effusion is present. Nose: Right sinus exhibits maxillary sinus tenderness and frontal sinus tenderness. Left sinus exhibits maxillary sinus tenderness and frontal sinus tenderness. Mouth/Throat: Oropharynx is clear and moist.   Eyes: Pupils are equal, round, and reactive to light. Conjunctivae and EOM are normal.   Neck: Neck supple. No thyromegaly present. Cardiovascular: Normal rate, regular rhythm, normal heart sounds and intact distal pulses. Pulmonary/Chest: Effort normal and breath sounds normal.   Abdominal: Soft. There is tenderness (generalized in lower abdomen). Lymphadenopathy:     She has cervical adenopathy. Neurological: She is alert and oriented to person, place, and time. Skin: Skin is warm. Psychiatric: She has a normal mood and affect. Her behavior is normal. Judgment and thought content normal.   Nursing note and vitals reviewed. Assessment & Plan     Diagnosis Orders   1.  Sinus pressure MCG/ACT inhaler     Sig: INHALE 1 (ONE) puff BY MOUTH TWICE DAILY     Dispense:  12 g     Refill:  2    montelukast (SINGULAIR) 10 MG tablet     Sig: Take 1 tablet by mouth nightly     Dispense:  30 tablet     Refill:  3    famotidine (PEPCID) 20 MG tablet     Sig: Take 1 tablet by mouth 2 times daily     Dispense:  60 tablet     Refill:  5    levocetirizine (XYZAL) 5 MG tablet     Sig: Take 1 tablet by mouth nightly     Dispense:  30 tablet     Refill:  3     Discussed with pt that her symptoms are likely viral in nature and we will tx the symptoms at this point in time. Will start fatigue work up with sleep study and referral to neurology. Discussed not taking sudafed with caffeine. Fu with me in 4-6 mos or sooner prn. Side effects, adverse effects of the medication prescribed today, as well as treatment plan/ rationale and result expectations have been discussed with the patient who expresses understanding and desires to proceed. Close follow up to evaluate treatment results and for coordination of care. I have reviewed the patient's medical history in detail and updated the computerized patient record. As always, patient is advised that if symptoms worsen in any way they will proceed to the nearest emergency room.      Darcie Weldon, APRN - CNP

## 2019-07-06 DIAGNOSIS — J34.89 SINUS PRESSURE: ICD-10-CM

## 2019-07-07 RX ORDER — PSEUDOEPHEDRINE HCL 30 MG/1
TABLET, FILM COATED ORAL
Qty: 40 TABLET | Refills: 0 | Status: SHIPPED | OUTPATIENT
Start: 2019-07-07 | End: 2019-07-25 | Stop reason: SINTOL

## 2019-07-10 ENCOUNTER — HOSPITAL ENCOUNTER (OUTPATIENT)
Dept: SLEEP CENTER | Age: 44
Discharge: HOME | End: 2019-07-12
Payer: COMMERCIAL

## 2019-07-10 PROCEDURE — 95810 POLYSOM 6/> YRS 4/> PARAM: CPT

## 2019-07-10 PROCEDURE — 95810 POLYSOM 6/> YRS 4/> PARAM: CPT | Performed by: INTERNAL MEDICINE

## 2019-07-12 DIAGNOSIS — R53.82 CHRONIC FATIGUE: ICD-10-CM

## 2019-07-25 ENCOUNTER — OFFICE VISIT (OUTPATIENT)
Dept: FAMILY MEDICINE CLINIC | Age: 44
End: 2019-07-25
Payer: COMMERCIAL

## 2019-07-25 VITALS
DIASTOLIC BLOOD PRESSURE: 62 MMHG | BODY MASS INDEX: 23.92 KG/M2 | HEIGHT: 62 IN | WEIGHT: 130 LBS | HEART RATE: 52 BPM | TEMPERATURE: 98.3 F | OXYGEN SATURATION: 98 % | SYSTOLIC BLOOD PRESSURE: 110 MMHG

## 2019-07-25 DIAGNOSIS — K86.89 PANCREATIC MASS: ICD-10-CM

## 2019-07-25 DIAGNOSIS — G40.909 NONINTRACTABLE EPILEPSY WITHOUT STATUS EPILEPTICUS, UNSPECIFIED EPILEPSY TYPE (HCC): ICD-10-CM

## 2019-07-25 DIAGNOSIS — J32.4 CHRONIC PANSINUSITIS: ICD-10-CM

## 2019-07-25 DIAGNOSIS — J30.9 ALLERGIC RHINITIS, UNSPECIFIED SEASONALITY, UNSPECIFIED TRIGGER: Primary | ICD-10-CM

## 2019-07-25 DIAGNOSIS — J30.9 ALLERGIC RHINITIS, UNSPECIFIED SEASONALITY, UNSPECIFIED TRIGGER: ICD-10-CM

## 2019-07-25 PROCEDURE — G8427 DOCREV CUR MEDS BY ELIG CLIN: HCPCS | Performed by: NURSE PRACTITIONER

## 2019-07-25 PROCEDURE — 99213 OFFICE O/P EST LOW 20 MIN: CPT | Performed by: NURSE PRACTITIONER

## 2019-07-25 PROCEDURE — G8420 CALC BMI NORM PARAMETERS: HCPCS | Performed by: NURSE PRACTITIONER

## 2019-07-25 PROCEDURE — 4004F PT TOBACCO SCREEN RCVD TLK: CPT | Performed by: NURSE PRACTITIONER

## 2019-07-25 RX ORDER — DIPHENHYDRAMINE HCL 25 MG
25 TABLET ORAL DAILY PRN
Status: ON HOLD | COMMUNITY
End: 2021-02-03

## 2019-07-25 ASSESSMENT — ENCOUNTER SYMPTOMS
DIARRHEA: 0
SHORTNESS OF BREATH: 0
COUGH: 0
CONSTIPATION: 0

## 2019-07-25 NOTE — PROGRESS NOTES
Subjective  Chief Complaint   Patient presents with    Discuss Medications     pt states that her allergies are so bad that nothing is helping not even the medication she is taking daily.  Discuss Labs     had sleep study fone 7/10/19        HPI    Sleep study done which was normal.   Still struggling with sleep. Thinks that issues are allergy related. States that she has been on all of the otc medications. Notes that she constantly feels congested in her sinuses with no relief from medication  No new known allergen exposures.     Patient Active Problem List    Diagnosis Date Noted    Sleep apnea     Lumbar spondylosis 11/21/2018    Myofascial pain syndrome 11/19/2018    Cervical spondylosis without myelopathy 11/08/2018    Pain of both hip joints 11/08/2018    Gastroesophageal reflux disease without esophagitis 09/10/2018    Depression 09/10/2018    Moderate persistent asthma 09/10/2018    Nonintractable epilepsy without status epilepticus ([facility] [address] 06/13/2018    Anxiety 11/28/2017    Pancreatic mass, 7mm, Dr Sammy Diaz 11/28/2017    Fibromyalgia 11/28/2017    Greater trochanteric bursitis of right hip 10/28/2017    Right sided sciatica 10/28/2017    Nausea 10/02/2017    Fatty liver 06/01/2017    Tobacco abuse 06/01/2017    Internal hemorrhoids 06/01/2017    Erosive gastritis 06/01/2017    Irritable bowel syndrome 06/01/2017     Past Medical History:   Diagnosis Date    Anxiety     Asthma     Attempted suicide ([facility] [address]     Depression     Diverticulosis     Erosive gastritis 6/1/2017    Fatty liver     Fatty pancreas     Fibromyalgia     Fibromyalgia 11/28/2017    Gastritis     Internal hemorrhoids 6/1/2017    Irritable bowel syndrome     Osteoarthritis     Pancreatic lesion     Pancreatic mass, 7mm 6/1/2017    Pancreatic mass, 7mm, Dr Sammy Diaz 11/28/2017    Past heart attack     Tobacco abuse 6/1/2017     Past Surgical History:   Procedure Laterality Date    BREAST LUMPECTOMY per session: None    Stress: None   Relationships    Social connections:     Talks on phone: None     Gets together: None     Attends Mormonism service: None     Active member of club or organization: None     Attends meetings of clubs or organizations: None     Relationship status: None    Intimate partner violence:     Fear of current or ex partner: None     Emotionally abused: None     Physically abused: None     Forced sexual activity: None   Other Topics Concern    None   Social History Narrative    None     Current Outpatient Medications on File Prior to Visit   Medication Sig Dispense Refill    diphenhydrAMINE (BENADRYL ALLERGY) 25 MG tablet Take 25 mg by mouth daily as needed for Itching      ondansetron (ZOFRAN-ODT) 4 MG disintegrating tablet dissolve 1 tablet in mouth every 8 hours as needed for Nausea or Vomiting 30 tablet 2    tiZANidine (ZANAFLEX) 4 MG tablet Take 1 tablet by mouth every 8 hours as needed (muscle spasms) 300 tablet 0    fluticasone (FLOVENT HFA) 110 MCG/ACT inhaler INHALE 1 (ONE) puff BY MOUTH TWICE DAILY 12 g 2    montelukast (SINGULAIR) 10 MG tablet Take 1 tablet by mouth nightly 30 tablet 3    famotidine (PEPCID) 20 MG tablet Take 1 tablet by mouth 2 times daily 60 tablet 5    levocetirizine (XYZAL) 5 MG tablet Take 1 tablet by mouth nightly 30 tablet 3    Caffeine (VIVARIN) 200 MG TABS Take 200 mg by mouth 2 times daily      naphazoline-pheniramine (NAPHCON-A) 0.025-0.3 % ophthalmic solution 1 drop 4 times daily       No current facility-administered medications on file prior to visit. Allergies   Allergen Reactions    Asa [Aspirin]     Demerol Hcl [Meperidine]     Mobic [Meloxicam] Other (See Comments)     bleeding    Morphine     Nsaids     Other      All oral narcotics, can take. Causes severe constipation.  Worse then normal     Xanax [Alprazolam]      \"left side of her body went numb\"    Zoloft [Sertraline Hcl] Other (See Comments)     Migraine trigger

## 2019-07-26 ENCOUNTER — APPOINTMENT (OUTPATIENT)
Dept: CT IMAGING | Age: 44
End: 2019-07-26
Payer: COMMERCIAL

## 2019-07-26 ENCOUNTER — HOSPITAL ENCOUNTER (EMERGENCY)
Age: 44
Discharge: HOME | End: 2019-07-26
Attending: EMERGENCY MEDICINE
Payer: COMMERCIAL

## 2019-07-26 VITALS
DIASTOLIC BLOOD PRESSURE: 73 MMHG | RESPIRATION RATE: 16 BRPM | WEIGHT: 130 LBS | TEMPERATURE: 97.8 F | SYSTOLIC BLOOD PRESSURE: 111 MMHG | BODY MASS INDEX: 23.92 KG/M2 | HEIGHT: 62 IN | OXYGEN SATURATION: 99 % | HEART RATE: 73 BPM

## 2019-07-26 DIAGNOSIS — G43.009 MIGRAINE WITHOUT AURA AND WITHOUT STATUS MIGRAINOSUS, NOT INTRACTABLE: Primary | ICD-10-CM

## 2019-07-26 DIAGNOSIS — F12.10 MARIJUANA ABUSE: ICD-10-CM

## 2019-07-26 LAB
AMPHETAMINE SCREEN, URINE: ABNORMAL
BACTERIA: NEGATIVE /HPF
BARBITURATE SCREEN URINE: ABNORMAL
BENZODIAZEPINE SCREEN, URINE: ABNORMAL
BILIRUBIN URINE: NEGATIVE
BLOOD, URINE: ABNORMAL
CANNABINOID SCREEN URINE: POSITIVE
CLARITY: CLEAR
COCAINE METABOLITE SCREEN URINE: ABNORMAL
COLOR: YELLOW
EPITHELIAL CELLS, UA: NORMAL /HPF (ref 0–5)
GLUCOSE URINE: NEGATIVE MG/DL
HYALINE CASTS: NORMAL /HPF (ref 0–5)
KETONES, URINE: NEGATIVE MG/DL
LEUKOCYTE ESTERASE, URINE: NEGATIVE
Lab: ABNORMAL
NITRITE, URINE: NEGATIVE
OPIATE SCREEN URINE: ABNORMAL
PH UA: 6 (ref 5–9)
PHENCYCLIDINE SCREEN URINE: ABNORMAL
PROTEIN UA: NEGATIVE MG/DL
RBC UA: NORMAL /HPF (ref 0–5)
SPECIFIC GRAVITY UA: 1.01 (ref 1–1.03)
UROBILINOGEN, URINE: 0.2 E.U./DL
WBC UA: NORMAL /HPF (ref 0–5)

## 2019-07-26 PROCEDURE — 96374 THER/PROPH/DIAG INJ IV PUSH: CPT

## 2019-07-26 PROCEDURE — 80307 DRUG TEST PRSMV CHEM ANLYZR: CPT

## 2019-07-26 PROCEDURE — 96375 TX/PRO/DX INJ NEW DRUG ADDON: CPT

## 2019-07-26 PROCEDURE — 81001 URINALYSIS AUTO W/SCOPE: CPT

## 2019-07-26 PROCEDURE — 70450 CT HEAD/BRAIN W/O DYE: CPT

## 2019-07-26 PROCEDURE — 6360000002 HC RX W HCPCS: Performed by: EMERGENCY MEDICINE

## 2019-07-26 PROCEDURE — 99284 EMERGENCY DEPT VISIT MOD MDM: CPT

## 2019-07-26 PROCEDURE — 2580000003 HC RX 258: Performed by: EMERGENCY MEDICINE

## 2019-07-26 RX ORDER — ORPHENADRINE CITRATE 30 MG/ML
60 INJECTION INTRAMUSCULAR; INTRAVENOUS ONCE
Status: COMPLETED | OUTPATIENT
Start: 2019-07-26 | End: 2019-07-26

## 2019-07-26 RX ORDER — 0.9 % SODIUM CHLORIDE 0.9 %
1000 INTRAVENOUS SOLUTION INTRAVENOUS ONCE
Status: COMPLETED | OUTPATIENT
Start: 2019-07-26 | End: 2019-07-26

## 2019-07-26 RX ORDER — ORPHENADRINE CITRATE 100 MG/1
100 TABLET, EXTENDED RELEASE ORAL 2 TIMES DAILY
Qty: 10 TABLET | Refills: 0 | Status: SHIPPED | OUTPATIENT
Start: 2019-07-26 | End: 2019-10-30

## 2019-07-26 RX ORDER — DIPHENHYDRAMINE HYDROCHLORIDE 50 MG/ML
25 INJECTION INTRAMUSCULAR; INTRAVENOUS ONCE
Status: COMPLETED | OUTPATIENT
Start: 2019-07-26 | End: 2019-07-26

## 2019-07-26 RX ADMIN — ORPHENADRINE CITRATE 60 MG: 30 INJECTION INTRAMUSCULAR; INTRAVENOUS at 15:21

## 2019-07-26 RX ADMIN — DIPHENHYDRAMINE HYDROCHLORIDE 25 MG: 50 INJECTION, SOLUTION INTRAMUSCULAR; INTRAVENOUS at 15:21

## 2019-07-26 RX ADMIN — SODIUM CHLORIDE 1000 ML: 9 INJECTION, SOLUTION INTRAVENOUS at 15:21

## 2019-07-26 ASSESSMENT — ENCOUNTER SYMPTOMS
PHOTOPHOBIA: 0
ANAL BLEEDING: 0
RHINORRHEA: 0
ABDOMINAL DISTENTION: 0
CONSTIPATION: 0
EYE PAIN: 0
COUGH: 0
EYE DISCHARGE: 0
VOICE CHANGE: 0
BACK PAIN: 0
CHOKING: 0
FACIAL SWELLING: 0
CHEST TIGHTNESS: 0
STRIDOR: 0
TROUBLE SWALLOWING: 0
SHORTNESS OF BREATH: 0
NAUSEA: 0
SORE THROAT: 0
VOMITING: 0
EYE REDNESS: 0
WHEEZING: 0
DIARRHEA: 0
COLOR CHANGE: 0
EYE ITCHING: 0
SINUS PRESSURE: 0
ABDOMINAL PAIN: 0
BLOOD IN STOOL: 0

## 2019-07-26 ASSESSMENT — PAIN DESCRIPTION - ONSET: ONSET: ON-GOING

## 2019-07-26 ASSESSMENT — PAIN DESCRIPTION - PROGRESSION: CLINICAL_PROGRESSION: GRADUALLY WORSENING

## 2019-07-26 ASSESSMENT — PAIN SCALES - GENERAL
PAINLEVEL_OUTOF10: 4
PAINLEVEL_OUTOF10: 10

## 2019-07-26 ASSESSMENT — PAIN DESCRIPTION - PAIN TYPE: TYPE: ACUTE PAIN

## 2019-07-26 ASSESSMENT — PAIN DESCRIPTION - FREQUENCY: FREQUENCY: INTERMITTENT

## 2019-07-26 ASSESSMENT — PAIN DESCRIPTION - DESCRIPTORS: DESCRIPTORS: SHARP

## 2019-07-26 ASSESSMENT — PAIN DESCRIPTION - ORIENTATION: ORIENTATION: RIGHT;OUTER;POSTERIOR

## 2019-07-26 ASSESSMENT — PAIN DESCRIPTION - LOCATION: LOCATION: HEAD

## 2019-07-26 NOTE — ED PROVIDER NOTES
endometriosis )    HYSTERECTOMY, TOTAL ABDOMINAL      one and 1/2 ovaries are left    [place] ESOPHAGOGASTRODUODENOSCOPY TRANSORAL DIAGNOSTIC N/A 10/26/2017    EGD ESOPHAGOGASTRODUODENOSCOPY performed by Lj Brizuela MD at [facility] ENDOSCOPY      WISDOM TOOTH EXTRACTION           CURRENT MEDICATIONS       Previous Medications    CAFFEINE (VIVARIN) 200 MG TABS    Take 200 mg by mouth 2 times daily    DIPHENHYDRAMINE (BENADRYL ALLERGY) 25 MG TABLET    Take 25 mg by mouth daily as needed for Itching    FAMOTIDINE (PEPCID) 20 MG TABLET    Take 1 tablet by mouth 2 times daily    FLUTICASONE (FLOVENT HFA) 110 MCG/ACT INHALER    INHALE 1 (ONE) puff BY MOUTH TWICE DAILY    LEVOCETIRIZINE (XYZAL) 5 MG TABLET    Take 1 tablet by mouth nightly    MONTELUKAST (SINGULAIR) 10 MG TABLET    Take 1 tablet by mouth nightly    NAPHAZOLINE-PHENIRAMINE (NAPHCON-A) 0.025-0.3 % OPHTHALMIC SOLUTION    1 drop 4 times daily    ONDANSETRON (ZOFRAN-ODT) 4 MG DISINTEGRATING TABLET    dissolve 1 tablet in mouth every 8 hours as needed for Nausea or Vomiting    TIZANIDINE (ZANAFLEX) 4 MG TABLET    Take 1 tablet by mouth every 8 hours as needed (muscle spasms)       ALLERGIES     Asa [aspirin]; Demerol hcl [meperidine]; Mobic [meloxicam]; Morphine; Nsaids;  Other; Xanax [alprazolam]; Zoloft [sertraline hcl]; and Trintellix [vortioxetine]    FAMILY HISTORY       Family History   Problem Relation Age of Onset   Tirso Alva Cancer Mother         ovarian    Asthma Mother     Other Mother         copd,emphysema    High Cholesterol Mother     Cancer Maternal Grandmother         ovarian    Asthma Maternal Grandmother     Other Maternal Grandmother         copd,emphysema    High Blood Pressure Sister     Diabetes Paternal Grandfather     Breast Cancer Neg Hx     Colon Cancer Neg Hx     Heart Attack Neg Hx     Heart Disease Neg Hx     Prostate Cancer Neg Hx     Stroke Neg Hx           SOCIAL HISTORY preliminarily interpreted by the emergency physician with the below findings:    CT scan of the head was performed which was interpreted by the radiologist as normal.    Interpretation per the Radiologist below, if available at the time of this note:    CT Head WO Contrast   Final Result      NO ACUTE INTRACRANIAL PROCESS, OR SIGNIFICANT CHANGE FROM 7/9/2011 IDENTIFIED. ED BEDSIDE ULTRASOUND:   Performed by ED Physician - none    LABS:  Labs Reviewed   URINE DRUG SCREEN - Abnormal; Notable for the following components:       Result Value    Cannabinoid Scrn, Ur POSITIVE (*)     All other components within normal limits   URINALYSIS - Abnormal; Notable for the following components:    Blood, Urine TRACE (*)     All other components within normal limits   MICROSCOPIC URINALYSIS       All other labs were within normal range or not returned as of this dictation. EMERGENCY DEPARTMENT COURSE and DIFFERENTIAL DIAGNOSIS/MDM:   Vitals:    Vitals:    07/26/19 1455 07/26/19 1550   BP: 105/71 111/73   Pulse: 77 73   Resp: 16 16   Temp: 97.8 °F (36.6 °C)    TempSrc: Oral    SpO2: 99% 99%   Weight: 130 lb (59 kg)    Height: 5' 2\" (1.575 m)        Printed a copy of the patient's lab and CT and discussed them with her. I notified her her drug screen is positive for marijuana and this may have contributed to her headache today. She will be discharged with education for her headache which helped her here. MDM      CRITICAL CARE TIME     CONSULTS:  None    PROCEDURES:  Unless otherwise noted below, none     Procedures    FINAL IMPRESSION      1. Episodic cluster headache, not intractable    2. Migraine without aura and without status migrainosus, not intractable    3. Marijuana abuse          DISPOSITION/PLAN   DISPOSITION Decision To Discharge 07/26/2019 04:48:53 PM      PATIENT REFERRED TO:  No follow-up provider specified.     DISCHARGE MEDICATIONS:  New Prescriptions    ORPHENADRINE (NORFLEX) 100 MG EXTENDED RELEASE TABLET    Take 1 tablet by mouth 2 times daily          (Please note that portions of this note were completed with a voice recognition program.  Efforts were made to edit the dictations but occasionally words are mis-transcribed.)    Antoinette Sandoval MD (electronically signed)  Attending Emergency Physician          Antoinette Sandoval MD  07/26/19 9440

## 2019-07-29 LAB
ALLERGEN ASPERGILLUS ALTERNATA IGE: <0.1 KU/L
ALLERGEN ASPERGILLUS FUMIGATUS IGE: <0.1 KU/L
ALLERGEN BERMUDA GRASS IGE: <0.1 KU/L
ALLERGEN BIRCH IGE: <0.1 KU/L
ALLERGEN CAT DANDER IGE: <0.1 KU/L
ALLERGEN COMMON SHORT RAGWEED IGE: <0.1 KU/L
ALLERGEN COTTONWOOD: <0.1 KU/L
ALLERGEN COW MILK IGE: <0.1 KU/L
ALLERGEN DOG DANDER IGE: <0.1 KU/L
ALLERGEN ELM IGE: <0.1 KU/L
ALLERGEN FUNGI/MOLD M.RACEMOSUS IGE: <0.1 KU/L
ALLERGEN GERMAN COCKROACH IGE: <0.1 KU/L
ALLERGEN HORMODENDRUM HORDEI IGE: <0.1 KU/L
ALLERGEN MAPLE/BOX ELDER IGE: <0.1 KU/L
ALLERGEN MITE DUST FARINAE IGE: <0.1 KU/L
ALLERGEN MITE DUST PTERONYSSINUS IGE: <0.1 KU/L
ALLERGEN MOUNTAIN CEDAR: <0.1 KU/L
ALLERGEN MOUSE EPITHELIA IGE: <0.1 KU/L
ALLERGEN OAK TREE IGE: <0.1 KU/L
ALLERGEN PEANUT (F13) IGE: <0.1 KU/L
ALLERGEN PECAN TREE IGE: <0.1 KU/L
ALLERGEN PENICILLIUM NOTATUM: <0.1 KU/L
ALLERGEN ROUGH PIGWEED (W14) IGE: <0.1 KU/L
ALLERGEN RUSSIAN THISTLE IGE: <0.1 KU/L
ALLERGEN SEE NOTE: NORMAL
ALLERGEN SHEEP SORREL (W18) IGE: <0.1 KU/L
ALLERGEN TIMOTHY GRASS: <0.1 KU/L
ALLERGEN TREE SYCAMORE: <0.1 KU/L
ALLERGEN WALNUT TREE IGE: <0.1 KU/L
ALLERGEN WHITE MULBERRY TREE, IGE: <0.1 KU/L
ALLERGEN, TREE, WHITE ASH IGE: <0.1 KU/L
IGE: 10 KU/L

## 2019-07-30 DIAGNOSIS — M79.7 FIBROMYALGIA: ICD-10-CM

## 2019-07-30 DIAGNOSIS — M62.838 MUSCLE SPASMS OF NECK: ICD-10-CM

## 2019-07-30 RX ORDER — TIZANIDINE 4 MG/1
4 TABLET ORAL EVERY 8 HOURS PRN
Qty: 30 TABLET | Refills: 5 | Status: SHIPPED | OUTPATIENT
Start: 2019-07-30 | End: 2019-12-30

## 2019-07-31 ENCOUNTER — TELEPHONE (OUTPATIENT)
Dept: FAMILY MEDICINE CLINIC | Age: 44
End: 2019-07-31

## 2019-07-31 DIAGNOSIS — J45.40 MODERATE PERSISTENT ASTHMA, UNSPECIFIED WHETHER COMPLICATED: Primary | ICD-10-CM

## 2019-08-01 RX ORDER — ALBUTEROL SULFATE 90 UG/1
2 AEROSOL, METERED RESPIRATORY (INHALATION) 4 TIMES DAILY PRN
Qty: 3 INHALER | Refills: 1 | Status: SHIPPED | OUTPATIENT
Start: 2019-08-01

## 2019-08-14 ENCOUNTER — HOSPITAL ENCOUNTER (OUTPATIENT)
Dept: MRI IMAGING | Age: 44
Discharge: HOME | End: 2019-08-16
Payer: COMMERCIAL

## 2019-08-14 DIAGNOSIS — G43.909 MIGRAINE WITHOUT STATUS MIGRAINOSUS, NOT INTRACTABLE, UNSPECIFIED MIGRAINE TYPE: ICD-10-CM

## 2019-08-14 DIAGNOSIS — C80.1 MALIGNANCY (HCC): ICD-10-CM

## 2019-08-14 DIAGNOSIS — R56.9 SEIZURES (HCC): ICD-10-CM

## 2019-08-14 PROCEDURE — 70553 MRI BRAIN STEM W/O & W/DYE: CPT

## 2019-08-14 PROCEDURE — A9579 GAD-BASE MR CONTRAST NOS,1ML: HCPCS | Performed by: PSYCHIATRY & NEUROLOGY

## 2019-08-14 PROCEDURE — 6360000004 HC RX CONTRAST MEDICATION: Performed by: PSYCHIATRY & NEUROLOGY

## 2019-08-14 RX ORDER — SODIUM CHLORIDE 0.9 % (FLUSH) 0.9 %
10 SYRINGE (ML) INJECTION 2 TIMES DAILY
Status: DISCONTINUED | OUTPATIENT
Start: 2019-08-14 | End: 2019-08-17 | Stop reason: HOSPADM

## 2019-08-14 RX ADMIN — GADOTERIDOL 11 ML: 279.3 INJECTION, SOLUTION INTRAVENOUS at 18:32

## 2019-09-02 DIAGNOSIS — R11.0 NAUSEA: ICD-10-CM

## 2019-09-03 RX ORDER — ONDANSETRON 4 MG/1
TABLET, ORALLY DISINTEGRATING ORAL
Qty: 30 TABLET | Refills: 2 | Status: SHIPPED | OUTPATIENT
Start: 2019-09-03 | End: 2019-12-03 | Stop reason: SDUPTHER

## 2019-09-24 ENCOUNTER — OFFICE VISIT (OUTPATIENT)
Dept: FAMILY MEDICINE CLINIC | Age: 44
End: 2019-09-24
Payer: COMMERCIAL

## 2019-09-24 VITALS
SYSTOLIC BLOOD PRESSURE: 128 MMHG | HEIGHT: 62 IN | WEIGHT: 133.8 LBS | TEMPERATURE: 97.6 F | HEART RATE: 89 BPM | OXYGEN SATURATION: 98 % | DIASTOLIC BLOOD PRESSURE: 74 MMHG | BODY MASS INDEX: 24.62 KG/M2

## 2019-09-24 DIAGNOSIS — J01.00 ACUTE MAXILLARY SINUSITIS, RECURRENCE NOT SPECIFIED: ICD-10-CM

## 2019-09-24 DIAGNOSIS — J45.901 EXACERBATION OF ASTHMA, UNSPECIFIED ASTHMA SEVERITY, UNSPECIFIED WHETHER PERSISTENT: Primary | ICD-10-CM

## 2019-09-24 PROCEDURE — G8420 CALC BMI NORM PARAMETERS: HCPCS | Performed by: NURSE PRACTITIONER

## 2019-09-24 PROCEDURE — G8427 DOCREV CUR MEDS BY ELIG CLIN: HCPCS | Performed by: NURSE PRACTITIONER

## 2019-09-24 PROCEDURE — 99213 OFFICE O/P EST LOW 20 MIN: CPT | Performed by: NURSE PRACTITIONER

## 2019-09-24 PROCEDURE — 4004F PT TOBACCO SCREEN RCVD TLK: CPT | Performed by: NURSE PRACTITIONER

## 2019-09-24 RX ORDER — AMOXICILLIN AND CLAVULANATE POTASSIUM 875; 125 MG/1; MG/1
1 TABLET, FILM COATED ORAL 2 TIMES DAILY
Qty: 20 TABLET | Refills: 0 | Status: SHIPPED | OUTPATIENT
Start: 2019-09-24 | End: 2019-10-04

## 2019-09-24 RX ORDER — METHYLPREDNISOLONE 4 MG/1
TABLET ORAL
Qty: 1 KIT | Refills: 0 | Status: SHIPPED | OUTPATIENT
Start: 2019-09-24 | End: 2019-09-30

## 2019-09-24 RX ORDER — LAMOTRIGINE 25 MG/1
TABLET ORAL
Refills: 3 | COMMUNITY
Start: 2019-08-29

## 2019-09-24 ASSESSMENT — ENCOUNTER SYMPTOMS
SHORTNESS OF BREATH: 1
WHEEZING: 1
COUGH: 1
SINUS COMPLAINT: 1
RHINORRHEA: 1
SORE THROAT: 1
SINUS PRESSURE: 1
SINUS PAIN: 1
CHEST TIGHTNESS: 1

## 2019-09-24 NOTE — PROGRESS NOTES
frontal sinus tenderness. Left sinus exhibits maxillary sinus tenderness. Left sinus exhibits no frontal sinus tenderness. Mouth/Throat: Uvula is midline. Posterior oropharyngeal erythema present. No tonsillar exudate. Neck: Normal range of motion. Neck supple. Cardiovascular: Normal rate, regular rhythm and normal heart sounds. Pulmonary/Chest: Effort normal. No respiratory distress. She has decreased breath sounds (coarse breath sounds). Lymphadenopathy:     She has cervical adenopathy. Skin: Skin is warm and dry. She is not diaphoretic. Vitals reviewed. POC Testing Today:No results found for this visit on 09/24/19. Assessment & Plan    Diagnosis Orders   1. Exacerbation of asthma, unspecified asthma severity, unspecified whether persistent  methylPREDNISolone (MEDROL, CHARISSA,) 4 MG tablet   2. Acute maxillary sinusitis, recurrence not specified  amoxicillin-clavulanate (AUGMENTIN) 875-125 MG per tablet     Discussed with patient that it is still viral at this time. Patient given a written prescription . Will not fill for at 5 days if symptoms do not improve. No orders of the defined types were placed in this encounter. Patient Instructions   Oral Steroid Instructions: Take each dose with a small snack or meal to lessen potential GI upset. Follow dosing instructions provided with prescription. Common side effects include difficulty sleeping and irritability. Take full course as ordered. Antibiotic Instructions:   Complete the full course of antibiotics as ordered. To prevent antibiotic resistances please take medication as ordered and for the full duration even if you start to feel better. Take each dose with a small snack or meal to lessen potential GI upset. Consider intake of yogurt or probiotic during antibiotic use and for a few days after to help reduce the risk of developing a secondary infection.   Take the yogurt or probiotic at least 2 hours after taking the antibiotic. Avoid alcohol while taking antibiotics. If you are using contraception please use a back up method of contraception such as condoms during antibiotic use and for 7 days after completing treatment to prevent pregnancy. Return if symptoms worsen or fail to improve, for follow up with your PCP. Side effects and adverse effects of any medication prescribed today, as well as treatment plan/rationale, follow-up care, and result expectations have been discussed with the patient. Expresses understanding and desires to proceed with treatment plan. Discussed signs and symptoms which require immediate follow-up in ED/call to 911. Understanding verbalized. I have reviewed and updated the electronic medical record.     Padmini Guidry, APRN - CNP

## 2019-09-26 DIAGNOSIS — J30.9 ACUTE ALLERGIC RHINITIS: ICD-10-CM

## 2019-09-29 RX ORDER — MONTELUKAST SODIUM 10 MG/1
10 TABLET ORAL NIGHTLY
Qty: 30 TABLET | Refills: 3 | Status: ON HOLD | OUTPATIENT
Start: 2019-09-29 | End: 2021-02-03

## 2019-10-15 ENCOUNTER — OFFICE VISIT (OUTPATIENT)
Dept: FAMILY MEDICINE CLINIC | Age: 44
End: 2019-10-15
Payer: COMMERCIAL

## 2019-10-15 VITALS
SYSTOLIC BLOOD PRESSURE: 122 MMHG | HEIGHT: 62 IN | DIASTOLIC BLOOD PRESSURE: 64 MMHG | HEART RATE: 90 BPM | OXYGEN SATURATION: 99 % | TEMPERATURE: 97.5 F | WEIGHT: 135.6 LBS | BODY MASS INDEX: 24.95 KG/M2

## 2019-10-15 DIAGNOSIS — J40 BRONCHITIS: Primary | ICD-10-CM

## 2019-10-15 DIAGNOSIS — F43.21 SITUATIONAL DEPRESSION: ICD-10-CM

## 2019-10-15 PROCEDURE — G8484 FLU IMMUNIZE NO ADMIN: HCPCS | Performed by: NURSE PRACTITIONER

## 2019-10-15 PROCEDURE — G8420 CALC BMI NORM PARAMETERS: HCPCS | Performed by: NURSE PRACTITIONER

## 2019-10-15 PROCEDURE — G8427 DOCREV CUR MEDS BY ELIG CLIN: HCPCS | Performed by: NURSE PRACTITIONER

## 2019-10-15 PROCEDURE — 4004F PT TOBACCO SCREEN RCVD TLK: CPT | Performed by: NURSE PRACTITIONER

## 2019-10-15 PROCEDURE — 99214 OFFICE O/P EST MOD 30 MIN: CPT | Performed by: NURSE PRACTITIONER

## 2019-10-15 RX ORDER — METHYLPREDNISOLONE 4 MG/1
TABLET ORAL
Qty: 21 TABLET | Refills: 0 | Status: SHIPPED | OUTPATIENT
Start: 2019-10-15 | End: 2019-10-21

## 2019-10-15 RX ORDER — AZITHROMYCIN 250 MG/1
250 TABLET, FILM COATED ORAL SEE ADMIN INSTRUCTIONS
Qty: 6 TABLET | Refills: 0 | Status: SHIPPED | OUTPATIENT
Start: 2019-10-15 | End: 2019-10-20

## 2019-10-15 RX ORDER — GABAPENTIN 300 MG/1
300 CAPSULE ORAL 2 TIMES DAILY
Refills: 0 | Status: ON HOLD | COMMUNITY
Start: 2019-09-23 | End: 2021-02-03

## 2019-10-15 ASSESSMENT — ENCOUNTER SYMPTOMS
COUGH: 1
WHEEZING: 1
SORE THROAT: 0
SHORTNESS OF BREATH: 1

## 2019-10-30 ENCOUNTER — OFFICE VISIT (OUTPATIENT)
Dept: FAMILY MEDICINE CLINIC | Age: 44
End: 2019-10-30
Payer: COMMERCIAL

## 2019-10-30 VITALS
WEIGHT: 134.2 LBS | TEMPERATURE: 100.2 F | HEIGHT: 62 IN | SYSTOLIC BLOOD PRESSURE: 124 MMHG | HEART RATE: 93 BPM | BODY MASS INDEX: 24.69 KG/M2 | DIASTOLIC BLOOD PRESSURE: 78 MMHG | OXYGEN SATURATION: 98 %

## 2019-10-30 DIAGNOSIS — J01.11 ACUTE RECURRENT FRONTAL SINUSITIS: Primary | ICD-10-CM

## 2019-10-30 PROCEDURE — G8484 FLU IMMUNIZE NO ADMIN: HCPCS | Performed by: NURSE PRACTITIONER

## 2019-10-30 PROCEDURE — G8420 CALC BMI NORM PARAMETERS: HCPCS | Performed by: NURSE PRACTITIONER

## 2019-10-30 PROCEDURE — 99213 OFFICE O/P EST LOW 20 MIN: CPT | Performed by: NURSE PRACTITIONER

## 2019-10-30 PROCEDURE — 4004F PT TOBACCO SCREEN RCVD TLK: CPT | Performed by: NURSE PRACTITIONER

## 2019-10-30 PROCEDURE — G8427 DOCREV CUR MEDS BY ELIG CLIN: HCPCS | Performed by: NURSE PRACTITIONER

## 2019-10-30 RX ORDER — BROMPHENIRAMINE MALEATE, PSEUDOEPHEDRINE HYDROCHLORIDE, AND DEXTROMETHORPHAN HYDROBROMIDE 2; 30; 10 MG/5ML; MG/5ML; MG/5ML
10 SYRUP ORAL 4 TIMES DAILY PRN
Qty: 240 ML | Refills: 0 | Status: ON HOLD | OUTPATIENT
Start: 2019-10-30 | End: 2021-02-03

## 2019-10-30 RX ORDER — FLUTICASONE PROPIONATE 50 MCG
2 SPRAY, SUSPENSION (ML) NASAL DAILY
Qty: 1 BOTTLE | Refills: 0 | Status: ON HOLD | OUTPATIENT
Start: 2019-10-30 | End: 2021-02-03

## 2019-10-30 ASSESSMENT — ENCOUNTER SYMPTOMS
RHINORRHEA: 1
WHEEZING: 0
EYES NEGATIVE: 1
BACK PAIN: 0
ALLERGIC/IMMUNOLOGIC NEGATIVE: 1
ABDOMINAL PAIN: 0
NAUSEA: 0
SINUS COMPLAINT: 1
VOMITING: 0
SHORTNESS OF BREATH: 0
SINUS PRESSURE: 1
SORE THROAT: 0
COUGH: 1

## 2019-11-07 ENCOUNTER — CARE COORDINATION (OUTPATIENT)
Dept: CARE COORDINATION | Age: 44
End: 2019-11-07

## 2019-11-14 ENCOUNTER — CARE COORDINATION (OUTPATIENT)
Dept: CARE COORDINATION | Age: 44
End: 2019-11-14

## 2019-11-29 ENCOUNTER — CARE COORDINATION (OUTPATIENT)
Dept: CARE COORDINATION | Age: 44
End: 2019-11-29

## 2019-12-03 ENCOUNTER — OFFICE VISIT (OUTPATIENT)
Dept: FAMILY MEDICINE CLINIC | Age: 44
End: 2019-12-03
Payer: COMMERCIAL

## 2019-12-03 VITALS
OXYGEN SATURATION: 97 % | BODY MASS INDEX: 25.32 KG/M2 | WEIGHT: 137.6 LBS | DIASTOLIC BLOOD PRESSURE: 76 MMHG | SYSTOLIC BLOOD PRESSURE: 122 MMHG | RESPIRATION RATE: 12 BRPM | HEART RATE: 117 BPM | HEIGHT: 62 IN | TEMPERATURE: 98.7 F

## 2019-12-03 DIAGNOSIS — L98.9 SKIN LESION: Primary | ICD-10-CM

## 2019-12-03 DIAGNOSIS — R11.0 NAUSEA: ICD-10-CM

## 2019-12-03 PROCEDURE — G8427 DOCREV CUR MEDS BY ELIG CLIN: HCPCS | Performed by: NURSE PRACTITIONER

## 2019-12-03 PROCEDURE — G8419 CALC BMI OUT NRM PARAM NOF/U: HCPCS | Performed by: NURSE PRACTITIONER

## 2019-12-03 PROCEDURE — 4004F PT TOBACCO SCREEN RCVD TLK: CPT | Performed by: NURSE PRACTITIONER

## 2019-12-03 PROCEDURE — G8484 FLU IMMUNIZE NO ADMIN: HCPCS | Performed by: NURSE PRACTITIONER

## 2019-12-03 PROCEDURE — 99213 OFFICE O/P EST LOW 20 MIN: CPT | Performed by: NURSE PRACTITIONER

## 2019-12-03 RX ORDER — ONDANSETRON 4 MG/1
TABLET, ORALLY DISINTEGRATING ORAL
Qty: 30 TABLET | Refills: 2 | Status: SHIPPED | OUTPATIENT
Start: 2019-12-03

## 2019-12-03 ASSESSMENT — ENCOUNTER SYMPTOMS: COLOR CHANGE: 1

## 2019-12-29 DIAGNOSIS — M79.7 FIBROMYALGIA: ICD-10-CM

## 2019-12-29 DIAGNOSIS — J45.40 MODERATE PERSISTENT ASTHMA, UNSPECIFIED WHETHER COMPLICATED: ICD-10-CM

## 2019-12-29 DIAGNOSIS — M62.838 MUSCLE SPASMS OF NECK: ICD-10-CM

## 2019-12-30 RX ORDER — TIZANIDINE 4 MG/1
TABLET ORAL
Qty: 30 TABLET | Refills: 5 | Status: SHIPPED | OUTPATIENT
Start: 2019-12-30

## 2019-12-30 RX ORDER — FLUTICASONE PROPIONATE 110 UG/1
AEROSOL, METERED RESPIRATORY (INHALATION)
Qty: 12 G | Refills: 2 | Status: SHIPPED | OUTPATIENT
Start: 2019-12-30 | End: 2020-05-18

## 2020-05-18 RX ORDER — FLUTICASONE PROPIONATE 110 UG/1
AEROSOL, METERED RESPIRATORY (INHALATION)
Qty: 12 G | Refills: 2 | Status: SHIPPED | OUTPATIENT
Start: 2020-05-18

## 2020-07-23 RX ORDER — FLUTICASONE PROPIONATE 110 UG/1
AEROSOL, METERED RESPIRATORY (INHALATION)
Qty: 12 G | Refills: 2 | OUTPATIENT
Start: 2020-07-23

## 2021-02-03 ENCOUNTER — ANESTHESIA EVENT (OUTPATIENT)
Dept: OPERATING ROOM | Age: 46
End: 2021-02-03
Payer: COMMERCIAL

## 2021-02-03 ENCOUNTER — ANESTHESIA (OUTPATIENT)
Dept: OPERATING ROOM | Age: 46
End: 2021-02-03
Payer: COMMERCIAL

## 2021-02-03 ENCOUNTER — HOSPITAL ENCOUNTER (OUTPATIENT)
Age: 46
Setting detail: OUTPATIENT SURGERY
Discharge: HOME | End: 2021-02-03
Attending: ORTHOPAEDIC SURGERY | Admitting: ORTHOPAEDIC SURGERY
Payer: COMMERCIAL

## 2021-02-03 VITALS
OXYGEN SATURATION: 99 % | DIASTOLIC BLOOD PRESSURE: 60 MMHG | RESPIRATION RATE: 18 BRPM | SYSTOLIC BLOOD PRESSURE: 99 MMHG | TEMPERATURE: 97.9 F | HEART RATE: 70 BPM

## 2021-02-03 VITALS — OXYGEN SATURATION: 100 % | DIASTOLIC BLOOD PRESSURE: 55 MMHG | SYSTOLIC BLOOD PRESSURE: 93 MMHG

## 2021-02-03 PROCEDURE — 3600000012 HC SURGERY LEVEL 2 ADDTL 15MIN: Performed by: ORTHOPAEDIC SURGERY

## 2021-02-03 PROCEDURE — 6360000002 HC RX W HCPCS: Performed by: NURSE ANESTHETIST, CERTIFIED REGISTERED

## 2021-02-03 PROCEDURE — 2580000003 HC RX 258: Performed by: NURSE ANESTHETIST, CERTIFIED REGISTERED

## 2021-02-03 PROCEDURE — 3600000002 HC SURGERY LEVEL 2 BASE: Performed by: ORTHOPAEDIC SURGERY

## 2021-02-03 PROCEDURE — 3700000000 HC ANESTHESIA ATTENDED CARE: Performed by: ORTHOPAEDIC SURGERY

## 2021-02-03 PROCEDURE — 2580000003 HC RX 258: Performed by: ORTHOPAEDIC SURGERY

## 2021-02-03 PROCEDURE — 2709999900 HC NON-CHARGEABLE SUPPLY: Performed by: ORTHOPAEDIC SURGERY

## 2021-02-03 PROCEDURE — 3700000001 HC ADD 15 MINUTES (ANESTHESIA): Performed by: ORTHOPAEDIC SURGERY

## 2021-02-03 RX ORDER — MAGNESIUM HYDROXIDE 1200 MG/15ML
LIQUID ORAL CONTINUOUS PRN
Status: COMPLETED | OUTPATIENT
Start: 2021-02-03 | End: 2021-02-03

## 2021-02-03 RX ORDER — ONDANSETRON 2 MG/ML
4 INJECTION INTRAMUSCULAR; INTRAVENOUS
Status: DISCONTINUED | OUTPATIENT
Start: 2021-02-03 | End: 2021-02-03 | Stop reason: HOSPADM

## 2021-02-03 RX ORDER — SODIUM CHLORIDE, SODIUM LACTATE, POTASSIUM CHLORIDE, CALCIUM CHLORIDE 600; 310; 30; 20 MG/100ML; MG/100ML; MG/100ML; MG/100ML
INJECTION, SOLUTION INTRAVENOUS CONTINUOUS
Status: DISCONTINUED | OUTPATIENT
Start: 2021-02-03 | End: 2021-02-03 | Stop reason: HOSPADM

## 2021-02-03 RX ORDER — METOCLOPRAMIDE HYDROCHLORIDE 5 MG/ML
10 INJECTION INTRAMUSCULAR; INTRAVENOUS
Status: DISCONTINUED | OUTPATIENT
Start: 2021-02-03 | End: 2021-02-03 | Stop reason: HOSPADM

## 2021-02-03 RX ORDER — DIPHENHYDRAMINE HYDROCHLORIDE 50 MG/ML
12.5 INJECTION INTRAMUSCULAR; INTRAVENOUS
Status: DISCONTINUED | OUTPATIENT
Start: 2021-02-03 | End: 2021-02-03 | Stop reason: HOSPADM

## 2021-02-03 RX ORDER — CEFAZOLIN SODIUM 1 G/50ML
INJECTION, SOLUTION INTRAVENOUS PRN
Status: DISCONTINUED | OUTPATIENT
Start: 2021-02-03 | End: 2021-02-03 | Stop reason: SDUPTHER

## 2021-02-03 RX ORDER — CEFAZOLIN SODIUM 2 G/50ML
2000 SOLUTION INTRAVENOUS ONCE
Status: DISCONTINUED | OUTPATIENT
Start: 2021-02-03 | End: 2021-02-03 | Stop reason: HOSPADM

## 2021-02-03 RX ORDER — SODIUM CHLORIDE, SODIUM LACTATE, POTASSIUM CHLORIDE, CALCIUM CHLORIDE 600; 310; 30; 20 MG/100ML; MG/100ML; MG/100ML; MG/100ML
INJECTION, SOLUTION INTRAVENOUS CONTINUOUS PRN
Status: DISCONTINUED | OUTPATIENT
Start: 2021-02-03 | End: 2021-02-03 | Stop reason: SDUPTHER

## 2021-02-03 RX ADMIN — SODIUM CHLORIDE, POTASSIUM CHLORIDE, SODIUM LACTATE AND CALCIUM CHLORIDE: 600; 310; 30; 20 INJECTION, SOLUTION INTRAVENOUS at 08:15

## 2021-02-03 RX ADMIN — CEFAZOLIN SODIUM 2 G: 1 INJECTION, SOLUTION INTRAVENOUS at 08:46

## 2021-02-03 ASSESSMENT — PULMONARY FUNCTION TESTS
PIF_VALUE: 0

## 2021-02-03 ASSESSMENT — LIFESTYLE VARIABLES: SMOKING_STATUS: 1

## 2021-02-03 NOTE — PROGRESS NOTES
Patient ID:  Sheryle Norse  [id number]  39 y.o.  1975  TAKEN TO PACU,   ATTACHED TO MONITOR AND REPORT GIVEN TO RN.   VSS DRSG DRY AND INTACT  CELLULAR PHONE IN LABELED BAG ON PATIENT CART        Electronically signed by Apolonia Bolaños RN on 2/3/2021

## 2021-02-03 NOTE — ANESTHESIA POSTPROCEDURE EVALUATION
Department of Anesthesiology  Postprocedure Note    Patient: Tevin Delgadillo  MRN: [id number]  YOB: 1975  Date of evaluation: 2/3/2021  Time:  9:10 AM     Procedure Summary     Date: 02/03/21 Room / Location: [address]    Anesthesia Start: 8290 Anesthesia Stop:     Procedure: LEFT CARPAL TUNNEL RELEASE (Left Wrist) Diagnosis: (RIGHT CARPAL TUNNEL SYNDROM)    Surgeons: Keri Rocha DO Responsible Provider: Siria Jimenez MD    Anesthesia Type: MAC ASA Status: 3          Anesthesia Type: MAC    Sadia Phase I:      Sadia Phase II:      Last vitals: Reviewed and per EMR flowsheets.        Anesthesia Post Evaluation    Patient location during evaluation: bedside  Patient participation: complete - patient participated  Level of consciousness: awake and alert  Pain score: 0  Airway patency: patent  Nausea & Vomiting: no vomiting and no nausea  Complications: no  Cardiovascular status: hemodynamically stable  Respiratory status: acceptable and room air  Hydration status: stable

## 2021-02-03 NOTE — PROGRESS NOTES
Dc instructions given to pt, verbalized understanding, denies needs, ice to left hand, pt getting dressed, gave pt coffee

## 2021-02-03 NOTE — ANESTHESIA PRE PROCEDURE
diphenhydrAMINE (BENADRYL ALLERGY) 25 MG tablet Take 25 mg by mouth daily as needed for Itching    Historical Provider, MD   famotidine (PEPCID) 20 MG tablet Take 1 tablet by mouth 2 times daily 7/3/19   VINNIE Henriquez CNP   levocetirizine (XYZAL) 5 MG tablet Take 1 tablet by mouth nightly 7/3/19   VINNIE Henriquez CNP       Current medications:    Current Facility-Administered Medications   Medication Dose Route Frequency Provider Last Rate Last Admin    ceFAZolin (ANCEF) 2000 mg in dextrose 3 % 50 mL IVPB (duplex)  2,000 mg Intravenous Once Marcus Brand,         lactated ringers infusion   Intravenous Continuous Marcus Brand DO         Facility-Administered Medications Ordered in Other Encounters   Medication Dose Route Frequency Provider Last Rate Last Admin    lactated ringers infusion    Continuous PRN VINNIE Trevino CRNA   New Bag at 02/03/21 0815       Allergies: Allergies   Allergen Reactions    Asa [Aspirin]     Demerol Hcl [Meperidine]     Mobic [Meloxicam] Other (See Comments)     bleeding    Morphine     Nsaids     Other      All oral narcotics, can take. Causes severe constipation.  Worse then normal     Xanax [Alprazolam]      \"left side of her body went numb\"    Zoloft [Sertraline Hcl] Other (See Comments)     Migraine trigger    Trintellix [Vortioxetine] Nausea And Vomiting       Problem List:    Patient Active Problem List   Diagnosis Code    Fatty liver K76.0    Tobacco abuse Z72.0    Internal hemorrhoids K64.8    Erosive gastritis K29.60    Irritable bowel syndrome K58.9    Nausea R11.0    Greater trochanteric bursitis of right hip M70.61    Right sided sciatica M54.31    Anxiety F41.9    Pancreatic mass, 7mm, Dr Bushra Chang K86.89    Fibromyalgia M79.7    Nonintractable epilepsy without status epilepticus ([facility] [address] G40.909    Gastroesophageal reflux disease without esophagitis K21.9    Depression F32.9    Moderate persistent asthma J45.40  Cervical spondylosis without myelopathy M47.812    Pain of both hip joints M25.551, M25.552    Myofascial pain syndrome M79.18    Lumbar spondylosis M47.816    Sleep apnea G47.30       Past Medical History:        Diagnosis Date    Anxiety     Asthma     Attempted [facility] [address]     Depression     Diverticulosis     Erosive gastritis 6/1/2017    Fatty liver     Fatty pancreas     Fibromyalgia     Fibromyalgia 11/28/2017    Gastritis     Internal hemorrhoids 6/1/2017    Irritable bowel syndrome     Osteoarthritis     Pancreatic lesion     Pancreatic mass, 7mm 6/1/2017    Pancreatic mass, 7mm, Dr Geovanna Sapp 11/28/2017    Past heart attack     Tobacco abuse 6/1/2017       Past Surgical History:        Procedure Laterality Date    BREAST LUMPECTOMY Bilateral     CHOLECYSTECTOMY      COLONOSCOPY      ENDOSCOPIC ULTRASOUND (LOWER) N/A 10/26/2017    EUS performed by Reinaldo Garcia MD at [facility] ( adhesiolysis; fibroids, endometriosis )    HYSTERECTOMY, TOTAL ABDOMINAL      one and 1/2 ovaries are left    [place] ESOPHAGOGASTRODUODENOSCOPY TRANSORAL DIAGNOSTIC N/A 10/26/2017    EGD ESOPHAGOGASTRODUODENOSCOPY performed by Reinaldo Garcia MD at [facility] [address] [facility] ENDOSCOPY      WISDOM TOOTH EXTRACTION         Social History:    Social History     Tobacco Use    Smoking status: Current Every Day Smoker     Packs/day: 0.50     Years: 30.00     Pack years: 15.00     Types: Cigarettes    Smokeless tobacco: Never Used    Tobacco comment: pt cutting back, 1  pck lasting 3 days    Substance Use Topics    Alcohol use:  Yes     Alcohol/week: 2.0 standard drinks     Types: 2 Cans of beer per week                                Ready to quit: Not Answered  Counseling given: Not Answered  Comment: pt cutting back, 1  pck lasting 3 days       Vital Signs (Current):   Vitals:    02/03/21 0815   BP: 99/60   Pulse: 70   Resp: 18   Temp: 97.9 °F (36.6 °C) TempSrc: Temporal   SpO2: 99%                                              BP Readings from Last 3 Encounters:   02/03/21 99/60   12/03/19 122/76   10/30/19 124/78       NPO Status:                                                                                 BMI:   Wt Readings from Last 3 Encounters:   12/03/19 137 lb 9.6 oz (62.4 kg)   10/30/19 134 lb 3.2 oz (60.9 kg)   10/15/19 135 lb 9.6 oz (61.5 kg)     There is no height or weight on file to calculate BMI.    CBC:   Lab Results   Component Value Date    WBC 14.6 11/26/2019    WBC 6.6 06/04/2019    RBC 4.56 11/26/2019    HGB 13.3 11/26/2019    HCT 41.6 11/26/2019    MCV 91 11/26/2019    RDW 14.5 06/04/2019     11/26/2019       CMP:   Lab Results   Component Value Date     11/26/2019    K 4.3 11/26/2019     11/26/2019    CO2 26 06/04/2019    BUN 8 06/04/2019    CREATININE 0.75 11/26/2019    GFRAA >60 11/26/2019    LABGLOM >60 11/26/2019    GLUCOSE 84 11/26/2019    PROT 6.4 06/04/2019    CALCIUM 9.4 11/26/2019    BILITOT 0.3 06/04/2019    ALKPHOS 41 06/04/2019    AST 15 06/04/2019    ALT 8 06/04/2019       POC Tests: No results for input(s): POCGLU, POCNA, POCK, POCCL, POCBUN, POCHEMO, POCHCT in the last 72 hours.     Coags:   Lab Results   Component Value Date    PROTIME 8.9 11/26/2019    INR 0.8 11/26/2019    APTT 28 11/26/2019       HCG (If Applicable): No results found for: PREGTESTUR, PREGSERUM, HCG, HCGQUANT     ABGs: No results found for: PHART, PO2ART, DBK8IRO, PAX0FBQ, BEART, H1QXZHPC     Type & Screen (If Applicable):  No results found for: LABABO, LABRH    Drug/Infectious Status (If Applicable):  No results found for: HIV, HEPCAB    COVID-19 Screening (If Applicable): No results found for: COVID19      Anesthesia Evaluation  Patient summary reviewed and Nursing notes reviewed no history of anesthetic complications:   Airway: Mallampati: II  TM distance: >3 FB   Neck ROM: full  Mouth opening: > = 3 FB Dental: normal exam Pulmonary:   (+) sleep apnea:  asthma: current smoker                           Cardiovascular:          ECG reviewed               Beta Blocker:  Not on Beta Blocker         Neuro/Psych:   (+) neuromuscular disease:, psychiatric history:            GI/Hepatic/Renal:   (+) GERD:, PUD, liver disease:,           Endo/Other: Negative Endo/Other ROS                    Abdominal:           Vascular: negative vascular ROS. Anesthesia Plan      MAC     ASA 3       Induction: intravenous. MIPS: Prophylactic antiemetics administered. Anesthetic plan and risks discussed with patient. Plan discussed with CRNA.     Attending anesthesiologist reviewed and agrees with Maeve Krause MD   2/3/2021

## 2021-02-03 NOTE — OP NOTE
antibiotics. The patient was then prepped and draped in the normal sterile fashion. After prepping and draping a longitudinal incision was marked out directly overlying the transverse carpal ligament in line with the ring finger ray. Forceps were used to gently grasp and pinch the skin in the zone of intended surgical dissection to check for adequate anesthesia. After confirming adequate anesthesia the 15 blade was used to incise skin and dissect down to the subcutaneous plane. The palmar aponeurosis was divided in line with the incision to expose the transverse carpal ligament. The transverse carpal ligament was then meticulously divided under direct visualization, working from distal to proximal, taking care to avoid iatrogenic injury to the underlying contents of the carpal tunnel. The tenotomy scissors were used to release the most proximal fibers of the transverse carpal ligament along with the most distal fibers of the antebrachial fascia. This was done under direct visualization. The distal release of the transverse carpal ligament was carried to the level of the fat change. Direct inspection and digital palpation confirmed complete release of the carpal tunnel. Copious irrigation was performed. Interrupted nylon stitches were used to close the skin. A bulky nonadherent dressing was placed. The patient was then allowed to head to recovery in stable condition. Overall the patient tolerated the procedure well.      Disposition: Stable to PACU       Electronically signed by Shawn Paige DO on 2/3/2021 at 9:08 AM

## 2024-02-27 ENCOUNTER — APPOINTMENT (OUTPATIENT)
Dept: RADIOLOGY | Facility: HOSPITAL | Age: 49
End: 2024-02-27
Payer: MEDICAID

## 2024-02-27 ENCOUNTER — HOSPITAL ENCOUNTER (EMERGENCY)
Facility: HOSPITAL | Age: 49
Discharge: HOME | End: 2024-02-27
Attending: EMERGENCY MEDICINE
Payer: MEDICAID

## 2024-02-27 VITALS
WEIGHT: 135 LBS | SYSTOLIC BLOOD PRESSURE: 117 MMHG | DIASTOLIC BLOOD PRESSURE: 70 MMHG | BODY MASS INDEX: 24.84 KG/M2 | HEART RATE: 107 BPM | HEIGHT: 62 IN | RESPIRATION RATE: 18 BRPM | TEMPERATURE: 97.6 F | OXYGEN SATURATION: 93 %

## 2024-02-27 DIAGNOSIS — R10.2 PELVIC PAIN: Primary | ICD-10-CM

## 2024-02-27 LAB
ALBUMIN SERPL BCP-MCNC: 4.4 G/DL (ref 3.4–5)
ALP SERPL-CCNC: 63 U/L (ref 33–110)
ALT SERPL W P-5'-P-CCNC: 8 U/L (ref 7–45)
ANION GAP SERPL CALC-SCNC: 9 MMOL/L (ref 10–20)
APPEARANCE UR: CLEAR
AST SERPL W P-5'-P-CCNC: 15 U/L (ref 9–39)
BASOPHILS # BLD AUTO: 0.05 X10*3/UL (ref 0–0.1)
BASOPHILS NFR BLD AUTO: 0.5 %
BILIRUB SERPL-MCNC: 0.4 MG/DL (ref 0–1.2)
BILIRUB UR STRIP.AUTO-MCNC: NEGATIVE MG/DL
BUN SERPL-MCNC: 11 MG/DL (ref 6–23)
CALCIUM SERPL-MCNC: 8.7 MG/DL (ref 8.6–10.3)
CHLORIDE SERPL-SCNC: 107 MMOL/L (ref 98–107)
CO2 SERPL-SCNC: 25 MMOL/L (ref 21–32)
COLOR UR: ABNORMAL
CREAT SERPL-MCNC: 0.76 MG/DL (ref 0.5–1.05)
EGFRCR SERPLBLD CKD-EPI 2021: >90 ML/MIN/1.73M*2
EOSINOPHIL # BLD AUTO: 0.13 X10*3/UL (ref 0–0.7)
EOSINOPHIL NFR BLD AUTO: 1.4 %
ERYTHROCYTE [DISTWIDTH] IN BLOOD BY AUTOMATED COUNT: 14.5 % (ref 11.5–14.5)
GLUCOSE SERPL-MCNC: 54 MG/DL (ref 74–99)
GLUCOSE UR STRIP.AUTO-MCNC: NEGATIVE MG/DL
HCT VFR BLD AUTO: 42.3 % (ref 36–46)
HGB BLD-MCNC: 13.6 G/DL (ref 12–16)
HOLD SPECIMEN: 293
IMM GRANULOCYTES # BLD AUTO: 0.03 X10*3/UL (ref 0–0.7)
IMM GRANULOCYTES NFR BLD AUTO: 0.3 % (ref 0–0.9)
KETONES UR STRIP.AUTO-MCNC: NEGATIVE MG/DL
LEUKOCYTE ESTERASE UR QL STRIP.AUTO: NEGATIVE
LYMPHOCYTES # BLD AUTO: 3.07 X10*3/UL (ref 1.2–4.8)
LYMPHOCYTES NFR BLD AUTO: 32.1 %
MCH RBC QN AUTO: 28.8 PG (ref 26–34)
MCHC RBC AUTO-ENTMCNC: 32.2 G/DL (ref 32–36)
MCV RBC AUTO: 90 FL (ref 80–100)
MONOCYTES # BLD AUTO: 0.68 X10*3/UL (ref 0.1–1)
MONOCYTES NFR BLD AUTO: 7.1 %
NEUTROPHILS # BLD AUTO: 5.61 X10*3/UL (ref 1.2–7.7)
NEUTROPHILS NFR BLD AUTO: 58.6 %
NITRITE UR QL STRIP.AUTO: NEGATIVE
NRBC BLD-RTO: 0 /100 WBCS (ref 0–0)
PH UR STRIP.AUTO: 5 [PH]
PLATELET # BLD AUTO: 237 X10*3/UL (ref 150–450)
POTASSIUM SERPL-SCNC: 3.8 MMOL/L (ref 3.5–5.3)
PROT SERPL-MCNC: 7 G/DL (ref 6.4–8.2)
PROT UR STRIP.AUTO-MCNC: NEGATIVE MG/DL
RBC # BLD AUTO: 4.72 X10*6/UL (ref 4–5.2)
RBC # UR STRIP.AUTO: ABNORMAL /UL
RBC #/AREA URNS AUTO: NORMAL /HPF
SODIUM SERPL-SCNC: 137 MMOL/L (ref 136–145)
SP GR UR STRIP.AUTO: 1.01
UROBILINOGEN UR STRIP.AUTO-MCNC: <2 MG/DL
WBC # BLD AUTO: 9.6 X10*3/UL (ref 4.4–11.3)
WBC #/AREA URNS AUTO: NORMAL /HPF

## 2024-02-27 PROCEDURE — 85025 COMPLETE CBC W/AUTO DIFF WBC: CPT | Performed by: EMERGENCY MEDICINE

## 2024-02-27 PROCEDURE — 99284 EMERGENCY DEPT VISIT MOD MDM: CPT | Mod: 25

## 2024-02-27 PROCEDURE — 2550000001 HC RX 255 CONTRASTS: Performed by: EMERGENCY MEDICINE

## 2024-02-27 PROCEDURE — 36415 COLL VENOUS BLD VENIPUNCTURE: CPT | Performed by: EMERGENCY MEDICINE

## 2024-02-27 PROCEDURE — 96375 TX/PRO/DX INJ NEW DRUG ADDON: CPT

## 2024-02-27 PROCEDURE — 74177 CT ABD & PELVIS W/CONTRAST: CPT

## 2024-02-27 PROCEDURE — 96374 THER/PROPH/DIAG INJ IV PUSH: CPT | Mod: 59

## 2024-02-27 PROCEDURE — 74177 CT ABD & PELVIS W/CONTRAST: CPT | Mod: FOREIGN READ | Performed by: RADIOLOGY

## 2024-02-27 PROCEDURE — 2500000004 HC RX 250 GENERAL PHARMACY W/ HCPCS (ALT 636 FOR OP/ED): Performed by: EMERGENCY MEDICINE

## 2024-02-27 PROCEDURE — 81001 URINALYSIS AUTO W/SCOPE: CPT | Performed by: EMERGENCY MEDICINE

## 2024-02-27 PROCEDURE — 80053 COMPREHEN METABOLIC PANEL: CPT | Performed by: EMERGENCY MEDICINE

## 2024-02-27 RX ORDER — ONDANSETRON HYDROCHLORIDE 2 MG/ML
4 INJECTION, SOLUTION INTRAVENOUS ONCE
Status: COMPLETED | OUTPATIENT
Start: 2024-02-27 | End: 2024-02-27

## 2024-02-27 RX ORDER — MORPHINE SULFATE 4 MG/ML
4 INJECTION, SOLUTION INTRAMUSCULAR; INTRAVENOUS ONCE
Status: DISCONTINUED | OUTPATIENT
Start: 2024-02-27 | End: 2024-02-27

## 2024-02-27 RX ADMIN — HYDROMORPHONE HYDROCHLORIDE 0.5 MG: 0.5 INJECTION, SOLUTION INTRAMUSCULAR; INTRAVENOUS; SUBCUTANEOUS at 19:05

## 2024-02-27 RX ADMIN — IOHEXOL 75 ML: 350 INJECTION, SOLUTION INTRAVENOUS at 19:37

## 2024-02-27 RX ADMIN — ONDANSETRON 4 MG: 2 INJECTION INTRAMUSCULAR; INTRAVENOUS at 17:40

## 2024-02-27 ASSESSMENT — PAIN DESCRIPTION - DESCRIPTORS: DESCRIPTORS: ACHING

## 2024-02-27 ASSESSMENT — PAIN DESCRIPTION - LOCATION: LOCATION: ABDOMEN

## 2024-02-27 ASSESSMENT — PAIN - FUNCTIONAL ASSESSMENT: PAIN_FUNCTIONAL_ASSESSMENT: 0-10

## 2024-02-27 ASSESSMENT — PAIN DESCRIPTION - PAIN TYPE: TYPE: ACUTE PAIN

## 2024-02-27 NOTE — ED PROVIDER NOTES
HPI   Chief Complaint   Patient presents with    pelvic pain with no bleeding or discharge       Patient presents to the emergency department secondary to pelvic pain.  This has been present for the past couple days.  She initially thought it may be related to her chronic constipation.  She tried multiple interventions and had no improvement.  She did have a bowel movement today.  She denies pain with urination or urinary frequency.  She denies any vaginal discharge.  She has a history of a hysterectomy.  She still has 1-1/2 ovaries left.  She has chronic nausea and vomiting which is unchanged.  No fever.  She has never had symptoms like this before.  She feels like there is swelling over the mons pubis and that there is a mass there.                          [place] Coma Scale Score: 15                  Patient History   No past medical history on file.  No past surgical history on file.  No family history on file.  Social History     Tobacco Use    Smoking status: Not on file    Smokeless tobacco: Not on file   Substance Use Topics    Alcohol use: Not on file    Drug use: Not on file       Physical Exam   ED Triage Vitals [02/27/24 1615]   Temperature Heart Rate Respirations BP   36.4 °C (97.6 °F) (!) 107 18 117/70      Pulse Ox Temp Source Heart Rate Source Patient Position   (!) 93 % Temporal Monitor Sitting      BP Location FiO2 (%)     Left arm --       Physical Exam  Vitals and nursing note reviewed.   Constitutional:       Appearance: Normal appearance.   HENT:      Head: Normocephalic and atraumatic.      Nose: Nose normal.      Mouth/Throat:      Mouth: Mucous membranes are moist.   Eyes:      Extraocular Movements: Extraocular movements intact.      Pupils: Pupils are equal, round, and reactive to light.   Cardiovascular:      Rate and Rhythm: Normal rate and regular rhythm.      Pulses: Normal pulses.      Heart sounds: Normal heart sounds.   Pulmonary:      Effort: Pulmonary effort is normal.       Breath sounds: Normal breath sounds.   Abdominal:      General: Abdomen is flat. Bowel sounds are normal.      Palpations: Abdomen is soft.      Tenderness: There is abdominal tenderness.      Comments: Patient has anterior tenderness to palpation over the mons pubis.  No skin changes.  No erythema or induration.  No fluctuance.  There does seem to be a slight fullness in the area but it is deep tissue.  No evidence of superficial skin infection or abscess.   Musculoskeletal:         General: Normal range of motion.      Cervical back: Normal range of motion.   Skin:     General: Skin is warm and dry.      Capillary Refill: Capillary refill takes less than 2 seconds.   Neurological:      General: No focal deficit present.      Mental Status: She is alert and oriented to person, place, and time.   Psychiatric:         Mood and Affect: Mood normal.         Behavior: Behavior normal.       Labs Reviewed - No data to display  Pain Management Panel           No data to display              No orders to display     ED Course & MDM   Diagnoses as of 02/27/24 2043   Pelvic pain       Medical Decision Making  Patient is here for pelvic pain.  Differential diagnosis for this patient is nonspecific abdominal pain, diverticulitis, atypical hernia or other acute cause.  Patient is evaluated in the emergency department CT abdomen pelvis and laboratory workup.  Urinalysis is obtained.  Patient is given 4 mg of IV morphine and 4 mg of IV Zofran for pain and nausea.  Patient stated that she was allergic to morphine after it was ordered.  She was given 0.5 mg of Dilaudid IV for pain control.  Laboratory workup at this time is unremarkable without acute abnormality.  CT abdomen pelvis shows no evidence of acute intra-abdominal or soft tissue process.  Patient is reassured.  She is to follow-up with her primary care physician or return here for worsening symptoms.  She is stable for discharge at this  time.        Procedure  Procedures     Ann Marie Briggs MD  02/27/24 2048

## 2024-05-10 ENCOUNTER — OFFICE VISIT (OUTPATIENT)
Dept: NEUROSURGERY | Facility: CLINIC | Age: 49
End: 2024-05-10
Payer: MEDICAID

## 2024-05-10 DIAGNOSIS — G95.9 CERVICAL MYELOPATHY (MULTI): ICD-10-CM

## 2024-05-10 DIAGNOSIS — M54.12 CERVICAL RADICULOPATHY: Primary | ICD-10-CM

## 2024-05-10 PROCEDURE — 99204 OFFICE O/P NEW MOD 45 MIN: CPT | Performed by: PHYSICIAN ASSISTANT

## 2024-05-10 NOTE — PROGRESS NOTES
[facility] [place]  Department of Neurological Surgery  New Patient Visit    History of Present Illness:  Cira Roberson is a 48 y.o. year old female who presents to the spine clinic with significant bilateral neck and arm pain radiating into her hands with numbness and weakness also associated.  Patient has prior history of C5-6 ACDF Dr. Obi Cunningham in November 2019.  She has had many significant physical experiences in her life in interim all of which have increased her symptoms.  Currently rates her pain at 8-10/10 with minimal improvement.  Numbness and weakness present in both hands and she drops items often.  Also endorses degree of balance disturbance to where she will use a cane for assistance occasionally.  She is unable to utilize NSAIDs secondary to prior kidney sepsis and acetaminophen unable to due to prior overdose and liver impact.  Continues on gabapentin 3 times daily and has regular visits with pain management physician.    Prior Spine Surgeries: C5-6 ACDF    Physical Therapy: prior  Diabetic: no   Osteoporosis: no  Patient's BMI is There is no height or weight on file to calculate BMI.    14/14 systems reviewed and negative other than what is listed in the history of present illness    There is no problem list on file for this patient.    Past Medical History:   Diagnosis Date    Other acute postprocedural pain 02/03/2021    Post-op pain    Personal history of other diseases of the female genital tract 03/30/2021    History of ovarian cyst    Personal history of other diseases of the female genital tract     History of endometriosis    Unspecified injury of head, initial encounter 10/28/2020    Closed head injury, initial encounter     Past Surgical History:   Procedure Laterality Date    OTHER SURGICAL HISTORY  07/29/2020    Colonoscopy    OTHER SURGICAL HISTORY  07/29/2020    Endoscopy    OTHER SURGICAL HISTORY  07/29/2020    Oral surgery    OTHER SURGICAL HISTORY   07/29/2020    Lumpectomy    OTHER SURGICAL HISTORY  07/29/2020    Cholecystectomy laparoscopic    OTHER SURGICAL HISTORY  03/30/2021    Hysterectomy    US ASPIRATION INJECTION INTERMEDIATE JOINT  11/18/2019    US ASPIRATION INJECTION INTERMEDIATE JOINT 11/18/2019 [facility]     Social History     Tobacco Use    Smoking status: Not on file    Smokeless tobacco: Not on file   Substance Use Topics    Alcohol use: Not on file     family history is not on file.  No current outpatient medications on file.  Allergies   Allergen Reactions    Morphine Hallucinations       Physical Examination    General: Well developed, awake/alert/oriented x3, no distress, alert and cooperative  Skin: Warm and dry, no lesions, no rashes  ENMT: Mucous membranes moist, no apparent injury, no lesions seen  Head/Neck: Neck Supple, no apparent injury  Respiratory/Thorax: Normal breath sounds with good chest expansion, thorax symmetric  Cardiovascular: No pitting edema, no JVD    Motor Strength: 3/5 right biceps, 4/5 bilateral triceps, wrist, . 5/5 Throughout all extremities    Muscle Bulk: Normal and symmetric in all extremities    Posture:   -- Cervical: Normal  -- Thoracic: Normal  -- Lumbar : Normal  Paraspinal muscle spasm/tenderness present cervical continuing to upper trapezius  Midline tenderness present cervical    Sensation: Significant deficit to light touch right posterior triceps and left anterior bicep continuing the hand in dermatomal fashion        Assessment and Plan:  Cira Roberson is a 48 y.o. year old female who presents to the spine clinic with significant bilateral neck and arm pain radiating into her hands with numbness and weakness also associated.  Patient has prior history of C5-6 ACDF Dr. Obi Cunningham in November 2019.  She has had many significant physical experiences in her life in interim all of which have increased her symptoms.  Currently rates her pain at 8-10/10 with minimal improvement.   Numbness and weakness present in both hands and she drops items often.  Also endorses degree of balance disturbance to where she will use a cane for assistance occasionally.  She is unable to utilize NSAIDs secondary to prior kidney sepsis and acetaminophen unable to due to prior overdose and liver impact.  Continues on gabapentin 3 times daily and has regular visits with pain management physician.    Updated imaging will be the plan though will initially begin with x-rays with flexion-extension.  Referral also provided to physical therapy for initial visit home exercise program.  As symptoms continue follow-up with MRI will be ordered.      The above clinical summary has been dictated with voice recognition software. It has not been proofread for grammatical errors, typographical mistakes, or other semantic inconsistencies.    Thank you for visiting our office today. It was our pleasure to take part in your healthcare.     Do not hesitate to call with any questions regarding your plan of care after leaving at [phone] M-F 8am-4pm.     To clinicians, thank you very much for this kind referral. It is a privilege to partner with you in the care of your patients. My office would be delighted to assist you with any further consultations or with questions regarding the plan of care outlined. Do not hesitate to call the office or contact me directly.       Sincerely,      ANAIS Ruvalcaba, PA-C  Associate Physician Assistant, Neurosurgery  Clinical   [facility]    [place] [address] [address] [place], [place] [address]    Phone: [phone]  Fax: [phone]

## 2024-06-10 ENCOUNTER — DOCUMENTATION (OUTPATIENT)
Dept: PHYSICAL THERAPY | Facility: HOSPITAL | Age: 49
End: 2024-06-10
Payer: MEDICAID

## 2024-06-10 NOTE — PROGRESS NOTES
Physical Therapy                 Therapy Communication Note    Patient Name: Cira Roberson  MRN: [id number]  Today's Date: 6/10/2024   : 1975      Discipline: Physical Therapy    Missed Visit Reason:              Comment: Pt. Did not show for Eval

## 2024-07-02 PROBLEM — R10.32 ABDOMINAL PAIN, ACUTE, LEFT LOWER QUADRANT: Status: ACTIVE | Noted: 2024-07-02

## 2024-07-02 PROBLEM — Z98.1 STATUS POST CERVICAL SPINAL FUSION: Status: ACTIVE | Noted: 2024-07-02

## 2024-07-02 PROBLEM — F41.9 ANXIETY AND DEPRESSION: Status: ACTIVE | Noted: 2024-07-02

## 2024-07-02 PROBLEM — R56.9 SEIZURES (MULTI): Status: ACTIVE | Noted: 2024-07-02

## 2024-07-02 PROBLEM — M25.511 PAIN IN JOINT OF RIGHT SHOULDER: Status: ACTIVE | Noted: 2024-07-02

## 2024-07-02 PROBLEM — S09.90XA CLOSED HEAD INJURY: Status: ACTIVE | Noted: 2024-07-02

## 2024-07-02 PROBLEM — N95.1 PERIMENOPAUSAL: Status: ACTIVE | Noted: 2024-07-02

## 2024-07-02 PROBLEM — H90.3 BILATERAL SENSORINEURAL HEARING LOSS: Status: ACTIVE | Noted: 2024-07-02

## 2024-07-02 PROBLEM — J34.3 HYPERTROPHY OF INFERIOR NASAL TURBINATE: Status: ACTIVE | Noted: 2024-07-02

## 2024-07-02 PROBLEM — H81.09 MENIERE DISEASE: Status: ACTIVE | Noted: 2024-07-02

## 2024-07-02 PROBLEM — K21.9 GERD (GASTROESOPHAGEAL REFLUX DISEASE): Status: ACTIVE | Noted: 2024-07-02

## 2024-07-02 PROBLEM — J45.909 ASTHMA (HHS-HCC): Status: ACTIVE | Noted: 2024-07-02

## 2024-07-02 PROBLEM — R10.2 PELVIC PAIN IN FEMALE: Status: ACTIVE | Noted: 2024-07-02

## 2024-07-02 PROBLEM — M54.12 CERVICAL MYELOPATHY WITH CERVICAL RADICULOPATHY (MULTI): Status: ACTIVE | Noted: 2024-07-02

## 2024-07-02 PROBLEM — H92.22 BLOOD IN LEFT EAR CANAL: Status: ACTIVE | Noted: 2024-07-02

## 2024-07-02 PROBLEM — J34.2 DEVIATED SEPTUM: Status: ACTIVE | Noted: 2024-07-02

## 2024-07-02 PROBLEM — S46.911A RIGHT SHOULDER STRAIN: Status: ACTIVE | Noted: 2024-07-02

## 2024-07-02 PROBLEM — K29.70 GASTRITIS: Status: ACTIVE | Noted: 2024-07-02

## 2024-07-02 PROBLEM — R11.2 NAUSEA AND/OR VOMITING: Status: ACTIVE | Noted: 2024-07-02

## 2024-07-02 PROBLEM — K59.09 CHRONIC CONSTIPATION: Status: ACTIVE | Noted: 2024-07-02

## 2024-07-02 PROBLEM — R35.0 URINARY FREQUENCY: Status: ACTIVE | Noted: 2024-07-02

## 2024-07-02 PROBLEM — R20.0 NUMBNESS AND TINGLING IN BOTH HANDS: Status: ACTIVE | Noted: 2024-07-02

## 2024-07-02 PROBLEM — R23.3 BRUISES EASILY: Status: ACTIVE | Noted: 2024-07-02

## 2024-07-02 PROBLEM — M62.838 MUSCLE SPASM: Status: ACTIVE | Noted: 2024-07-02

## 2024-07-02 PROBLEM — G56.03 CARPAL TUNNEL SYNDROME ON BOTH SIDES: Status: ACTIVE | Noted: 2024-07-02

## 2024-07-02 PROBLEM — R63.4 WEIGHT LOSS: Status: ACTIVE | Noted: 2024-07-02

## 2024-07-02 PROBLEM — F43.10 PTSD (POST-TRAUMATIC STRESS DISORDER): Status: ACTIVE | Noted: 2024-07-02

## 2024-07-02 PROBLEM — N73.6 PELVIC ADHESIONS: Status: ACTIVE | Noted: 2024-07-02

## 2024-07-02 PROBLEM — K58.1 IRRITABLE BOWEL SYNDROME WITH CONSTIPATION: Status: ACTIVE | Noted: 2024-07-02

## 2024-07-02 PROBLEM — F32.A ANXIETY AND DEPRESSION: Status: ACTIVE | Noted: 2024-07-02

## 2024-07-02 PROBLEM — G95.9 CERVICAL MYELOPATHY WITH CERVICAL RADICULOPATHY (MULTI): Status: ACTIVE | Noted: 2024-07-02

## 2024-07-02 PROBLEM — R20.2 NUMBNESS AND TINGLING IN BOTH HANDS: Status: ACTIVE | Noted: 2024-07-02

## 2024-07-02 RX ORDER — ONDANSETRON 8 MG/1
TABLET, ORALLY DISINTEGRATING ORAL
COMMUNITY
Start: 2024-04-09 | End: 2024-07-09 | Stop reason: SDUPTHER

## 2024-07-02 RX ORDER — PANTOPRAZOLE SODIUM 20 MG/1
20 TABLET, DELAYED RELEASE ORAL DAILY
COMMUNITY
Start: 2024-05-06 | End: 2024-07-09 | Stop reason: SDUPTHER

## 2024-07-02 RX ORDER — LAMOTRIGINE 200 MG/1
1 TABLET ORAL NIGHTLY
COMMUNITY
Start: 2024-05-20

## 2024-07-02 RX ORDER — FAMOTIDINE 20 MG/1
1 TABLET, FILM COATED ORAL DAILY
COMMUNITY

## 2024-07-02 RX ORDER — FLUOXETINE HYDROCHLORIDE 40 MG/1
40 CAPSULE ORAL DAILY
COMMUNITY
Start: 2024-05-16

## 2024-07-02 RX ORDER — FLUOXETINE HYDROCHLORIDE 20 MG/1
CAPSULE ORAL
COMMUNITY
Start: 2024-05-20

## 2024-07-02 RX ORDER — GABAPENTIN 300 MG/1
300 CAPSULE ORAL 4 TIMES DAILY
COMMUNITY
Start: 2024-04-07

## 2024-07-02 RX ORDER — PRAZOSIN HYDROCHLORIDE 1 MG/1
1 CAPSULE ORAL
COMMUNITY
Start: 2023-11-18

## 2024-07-02 RX ORDER — AMOXICILLIN AND CLAVULANATE POTASSIUM 875; 125 MG/1; MG/1
1 TABLET, FILM COATED ORAL 2 TIMES DAILY
COMMUNITY
Start: 2024-01-04

## 2024-07-02 RX ORDER — HYDROXYZINE HYDROCHLORIDE 10 MG/1
10 TABLET, FILM COATED ORAL 3 TIMES DAILY PRN
COMMUNITY
Start: 2024-03-26

## 2024-07-02 RX ORDER — TIZANIDINE 4 MG/1
TABLET ORAL
COMMUNITY
Start: 2024-03-13

## 2024-07-02 RX ORDER — LAMOTRIGINE 25 MG/1
TABLET ORAL
COMMUNITY
Start: 2023-11-18

## 2024-07-09 ENCOUNTER — APPOINTMENT (OUTPATIENT)
Dept: PRIMARY CARE | Facility: CLINIC | Age: 49
End: 2024-07-09
Payer: MEDICAID

## 2024-07-09 ENCOUNTER — LAB (OUTPATIENT)
Dept: LAB | Facility: LAB | Age: 49
End: 2024-07-09
Payer: MEDICAID

## 2024-07-09 VITALS
TEMPERATURE: 97.1 F | RESPIRATION RATE: 18 BRPM | HEIGHT: 62 IN | OXYGEN SATURATION: 98 % | WEIGHT: 125 LBS | DIASTOLIC BLOOD PRESSURE: 54 MMHG | BODY MASS INDEX: 23 KG/M2 | HEART RATE: 68 BPM | SYSTOLIC BLOOD PRESSURE: 92 MMHG

## 2024-07-09 DIAGNOSIS — K21.9 GASTROESOPHAGEAL REFLUX DISEASE WITHOUT ESOPHAGITIS: ICD-10-CM

## 2024-07-09 DIAGNOSIS — Z13.220 SCREENING, LIPID: ICD-10-CM

## 2024-07-09 DIAGNOSIS — Z11.4 SCREENING FOR HIV (HUMAN IMMUNODEFICIENCY VIRUS): ICD-10-CM

## 2024-07-09 DIAGNOSIS — J45.20 MILD INTERMITTENT ASTHMA WITHOUT COMPLICATION (HHS-HCC): ICD-10-CM

## 2024-07-09 DIAGNOSIS — R56.9 SEIZURES (MULTI): ICD-10-CM

## 2024-07-09 DIAGNOSIS — Z12.31 ENCOUNTER FOR SCREENING MAMMOGRAM FOR MALIGNANT NEOPLASM OF BREAST: ICD-10-CM

## 2024-07-09 DIAGNOSIS — K59.09 CHRONIC CONSTIPATION: ICD-10-CM

## 2024-07-09 DIAGNOSIS — R30.0 DYSURIA: ICD-10-CM

## 2024-07-09 DIAGNOSIS — Z12.31 BREAST CANCER SCREENING BY MAMMOGRAM: ICD-10-CM

## 2024-07-09 DIAGNOSIS — Z11.59 NEED FOR HEPATITIS C SCREENING TEST: ICD-10-CM

## 2024-07-09 DIAGNOSIS — Z76.89 ENCOUNTER TO ESTABLISH CARE WITH NEW DOCTOR: Primary | ICD-10-CM

## 2024-07-09 LAB
APPEARANCE UR: CLEAR
BILIRUB UR STRIP.AUTO-MCNC: NEGATIVE MG/DL
COLOR UR: ABNORMAL
GLUCOSE UR STRIP.AUTO-MCNC: NORMAL MG/DL
KETONES UR STRIP.AUTO-MCNC: NEGATIVE MG/DL
LEUKOCYTE ESTERASE UR QL STRIP.AUTO: NEGATIVE
NITRITE UR QL STRIP.AUTO: NEGATIVE
PH UR STRIP.AUTO: 6.5 [PH]
PROT UR STRIP.AUTO-MCNC: NEGATIVE MG/DL
RBC # UR STRIP.AUTO: ABNORMAL /UL
RBC #/AREA URNS AUTO: NORMAL /HPF
SP GR UR STRIP.AUTO: 1.01
SQUAMOUS #/AREA URNS AUTO: NORMAL /HPF
UROBILINOGEN UR STRIP.AUTO-MCNC: NORMAL MG/DL
WBC #/AREA URNS AUTO: NORMAL /HPF

## 2024-07-09 PROCEDURE — 99204 OFFICE O/P NEW MOD 45 MIN: CPT | Performed by: FAMILY MEDICINE

## 2024-07-09 PROCEDURE — 80061 LIPID PANEL: CPT

## 2024-07-09 PROCEDURE — 80053 COMPREHEN METABOLIC PANEL: CPT

## 2024-07-09 PROCEDURE — 87389 HIV-1 AG W/HIV-1&-2 AB AG IA: CPT

## 2024-07-09 PROCEDURE — 81001 URINALYSIS AUTO W/SCOPE: CPT

## 2024-07-09 PROCEDURE — 1036F TOBACCO NON-USER: CPT | Performed by: FAMILY MEDICINE

## 2024-07-09 PROCEDURE — 86803 HEPATITIS C AB TEST: CPT

## 2024-07-09 PROCEDURE — 84443 ASSAY THYROID STIM HORMONE: CPT

## 2024-07-09 PROCEDURE — 36415 COLL VENOUS BLD VENIPUNCTURE: CPT

## 2024-07-09 RX ORDER — ONDANSETRON 8 MG/1
TABLET, ORALLY DISINTEGRATING ORAL
Qty: 20 TABLET | Refills: 2 | Status: SHIPPED | OUTPATIENT
Start: 2024-07-09

## 2024-07-09 RX ORDER — FLUTICASONE PROPIONATE 44 UG/1
1 AEROSOL, METERED RESPIRATORY (INHALATION)
COMMUNITY
End: 2024-07-09 | Stop reason: SDUPTHER

## 2024-07-09 RX ORDER — FLUTICASONE PROPIONATE 44 UG/1
1 AEROSOL, METERED RESPIRATORY (INHALATION)
Qty: 10.6 G | Refills: 2 | Status: SHIPPED | OUTPATIENT
Start: 2024-07-09

## 2024-07-09 RX ORDER — ALBUTEROL SULFATE 90 UG/1
2 AEROSOL, METERED RESPIRATORY (INHALATION) EVERY 4 HOURS PRN
Qty: 18 G | Refills: 3 | Status: SHIPPED | OUTPATIENT
Start: 2024-07-09

## 2024-07-09 RX ORDER — PANTOPRAZOLE SODIUM 40 MG/1
40 TABLET, DELAYED RELEASE ORAL DAILY
Qty: 90 TABLET | Refills: 3 | Status: SHIPPED | OUTPATIENT
Start: 2024-07-09

## 2024-07-09 RX ORDER — ALBUTEROL SULFATE 90 UG/1
2 AEROSOL, METERED RESPIRATORY (INHALATION) AS NEEDED
COMMUNITY
End: 2024-07-09 | Stop reason: SDUPTHER

## 2024-07-09 ASSESSMENT — PATIENT HEALTH QUESTIONNAIRE - PHQ9
1. LITTLE INTEREST OR PLEASURE IN DOING THINGS: NEARLY EVERY DAY
4. FEELING TIRED OR HAVING LITTLE ENERGY: NEARLY EVERY DAY
8. MOVING OR SPEAKING SO SLOWLY THAT OTHER PEOPLE COULD HAVE NOTICED. OR THE OPPOSITE, BEING SO FIGETY OR RESTLESS THAT YOU HAVE BEEN MOVING AROUND A LOT MORE THAN USUAL: NEARLY EVERY DAY
9. THOUGHTS THAT YOU WOULD BE BETTER OFF DEAD, OR OF HURTING YOURSELF: NOT AT ALL
10. IF YOU CHECKED OFF ANY PROBLEMS, HOW DIFFICULT HAVE THESE PROBLEMS MADE IT FOR YOU TO DO YOUR WORK, TAKE CARE OF THINGS AT HOME, OR GET ALONG WITH OTHER PEOPLE: EXTREMELY DIFFICULT
SUM OF ALL RESPONSES TO PHQ9 QUESTIONS 1 & 2: 6
SUM OF ALL RESPONSES TO PHQ QUESTIONS 1-9: 24
7. TROUBLE CONCENTRATING ON THINGS, SUCH AS READING THE NEWSPAPER OR WATCHING TELEVISION: NEARLY EVERY DAY
2. FEELING DOWN, DEPRESSED OR HOPELESS: NEARLY EVERY DAY
3. TROUBLE FALLING OR STAYING ASLEEP: NEARLY EVERY DAY
5. POOR APPETITE OR OVEREATING: NEARLY EVERY DAY
6. FEELING BAD ABOUT YOURSELF - OR THAT YOU ARE A FAILURE OR HAVE LET YOURSELF OR YOUR FAMILY DOWN: NEARLY EVERY DAY

## 2024-07-09 NOTE — PROGRESS NOTES
"Subjective   Patient ID: Cira Roberson is a 48 y.o. female who presents for Establish Care (/).    HPI     Patient was last seen about 3 years ago.   Patient is also seeing Dr Guo.     Allergies and Medications reviewed and updated in chart.     Patient has/no significant fam hx   Patient has/no surgical hx     Patient states burning micturition and urgency with incontinence and that she would like a UA done due to history of kidney infection.    Pt states that she has every symptom of TESSIE- Fatigue, Weakness, Aches, muscle cramps, sharp pain, Headaches, Light sensitivity, red eyes, blurred vision, tearing, sinus problems, cough, SOB, Joint pain, /morning stiffness, Memory issues, difficulty focusing, word finding difficulties, decreased retention of new knowledge, confusion, disorientation, skin sensitivity, mood swings, sweats, can not regulate her temp, excessive thirst, static shock, numbness, tingling, vertigo, metallic taste, abdominal pain, diarrhea, tremors, unusual pain, migraine, appetite swings, increased urination. States that it has worsened over the last 3 years. Pt would like testing done to confirm that she has this. Patient has a list of labs which she would like to have obtained.    Patient mentions having chronic constipation on and off.    Patient mentions that her Ex- is a chronic drug user and is Hepatitis positive. So she would like to get checked for her hepatitis.    Review of Systems    Objective   BP 92/54 (BP Location: Right arm, Patient Position: Sitting, BP Cuff Size: Adult long)   Pulse 68   Temp 36.2 °C (97.1 °F) (Temporal)   Resp 18   Ht 1.575 m (5' 2\")   Wt 56.7 kg (125 lb)   SpO2 98%   BMI 22.86 kg/m²     Physical Exam    92/56 on recheck of BP in the right arm.     Gen. Appearance - well-developed, well-nourished, 48 y.o., White female in no acute distress.     Skin - warm, pink and dry without rash or concerning lesions.    Mental Status - alert and oriented " times 3. Normal mood and affect appropriate to mood.     Ears - TMs shiny and move well with insufflation. Ear canals are clear bilaterally.     Neck - supple without lymphadenopathy. Carotid pulses are normal without bruits. Thyroid is normal in midline without nodules.    Chest - lungs are clear to auscultation without rales, rhonchi or wheezes.    Heart - regular, rate, and rhythm without murmurs, rubs or gallops.     Abdomen - soft, flat, nontender, nondistended. No masses, hepatomegaly or splenomegaly is noted. No rebound, rigidity or guarding is noted. Bowel sounds are normoactive.     Extremities - no cyanosis, clubbing or edema. Pedal pulses are 2+ normal at the dorsalis pedis and posterior tibial pulses bilaterally.     Neurological - cranial nerves II through XII are grossly intact. Motor strength 5/5 at all fours.     Assessment/Plan   1. Encounter to establish care with new doctor        2. Mild intermittent asthma without complication ([facility])  fluticasone (Flovent HFA) 44 mcg/actuation inhaler    albuterol 90 mcg/actuation inhaler      3. Gastroesophageal reflux disease without esophagitis  pantoprazole (ProtoNix) 40 mg EC tablet    ondansetron ODT (Zofran-ODT) 8 mg disintegrating tablet    CBC and Auto Differential      4. Dysuria  Urinalysis with Reflex Microscopic      5. Seizures (Multi)  Comprehensive Metabolic Panel      6. Chronic constipation  Referral to Gastroenterology    TSH with reflex to Free T4 if abnormal      7. Encounter for screening mammogram for malignant neoplasm of breast  CANCELED: BI mammo bilateral screening tomosynthesis      8. Screening for HIV (human immunodeficiency virus)  HIV 1/2 Antigen/Antibody Screen with Reflex to Confirmation      9. Need for hepatitis C screening test  Hepatitis C Antibody      10. Breast cancer screening by mammogram  BI mammo bilateral screening tomosynthesis      11. Screening, lipid  Lipid Panel        Patient to continue current medications  (with any exceptions as noted) and diet. Follow-up in 1 month(s), otherwise as needed.      Ordered Urinalysis with Reflex Microscopic. Will call patient with results when available.      Patient was given refill(s) on:   Ondansetron 8 mg disintegrating tablet, DISSOLVE 1 TAB IN MOUTH EVERY DAY AS NEEDED FOR NAUSEA   Pantoprazole Sodium 40 mg EC tablet, TAKE 1 TAB BY MOUTH DAILY   Fluticasone Propionate 44 mcg/act,  INHALE 1 PUFF 2 TIMES A DAY. RINSE MOUTH WITH WATER AFTER USE TO REDUCE AFTERTASTE AND INCIDENCE OF CANDIDIASIS. DO NOT SWALLOW.   Albuterol Sulfate HFA 90 mcg/act, INHALE 2 PUFFS IF NEEDED FOR WHEEZING.     Rx(s) sent to pharmacy.     Mammogram ordered for yearly breast cancer screening. Will call patient with results when available.     Ordered CBC, CMP, TSH with reflex to free T4, lipid panel, Hepatitis C antibody and HIV antibody for Hepatitis C and HIV screening. Will call patient with results when available.      A new referral to Dr. Stroud (Gastro) has been sent for constipation and colorectal cancer screening. Consider adding further labs in order to rule out chronic reactive inflammatory syndrome.    Scribe Attestation  By signing my name below, IClaudia Scribe   attest that this documentation has been prepared under the direction and in the presence of Oz Varela MD.

## 2024-07-10 LAB
ALBUMIN SERPL BCP-MCNC: 4.7 G/DL (ref 3.4–5)
ALP SERPL-CCNC: 70 U/L (ref 33–110)
ALT SERPL W P-5'-P-CCNC: 8 U/L (ref 7–45)
ANION GAP SERPL CALC-SCNC: 16 MMOL/L (ref 10–20)
AST SERPL W P-5'-P-CCNC: 19 U/L (ref 9–39)
BILIRUB SERPL-MCNC: 0.5 MG/DL (ref 0–1.2)
BUN SERPL-MCNC: 11 MG/DL (ref 6–23)
CALCIUM SERPL-MCNC: 9.5 MG/DL (ref 8.6–10.6)
CHLORIDE SERPL-SCNC: 101 MMOL/L (ref 98–107)
CHOLEST SERPL-MCNC: 214 MG/DL (ref 0–199)
CHOLESTEROL/HDL RATIO: 3.9
CO2 SERPL-SCNC: 25 MMOL/L (ref 21–32)
CREAT SERPL-MCNC: 0.86 MG/DL (ref 0.5–1.05)
EGFRCR SERPLBLD CKD-EPI 2021: 83 ML/MIN/1.73M*2
GLUCOSE SERPL-MCNC: 80 MG/DL (ref 74–99)
HCV AB SER QL: NONREACTIVE
HDLC SERPL-MCNC: 54.7 MG/DL
HIV 1+2 AB+HIV1 P24 AG SERPL QL IA: NONREACTIVE
LDLC SERPL CALC-MCNC: 140 MG/DL
NON HDL CHOLESTEROL: 159 MG/DL (ref 0–149)
POTASSIUM SERPL-SCNC: 4.1 MMOL/L (ref 3.5–5.3)
PROT SERPL-MCNC: 7.4 G/DL (ref 6.4–8.2)
SODIUM SERPL-SCNC: 138 MMOL/L (ref 136–145)
TRIGL SERPL-MCNC: 95 MG/DL (ref 0–149)
TSH SERPL-ACNC: 1.88 MIU/L (ref 0.44–3.98)
VLDL: 19 MG/DL (ref 0–40)

## 2024-07-10 NOTE — ASSESSMENT & PLAN NOTE
Seizures are stable.  Patient to continue with current medications and treatment plan.  Follow-up at least annually.  Patient is to follow-up with her neurologist as scheduled.

## 2024-07-15 ENCOUNTER — HOSPITAL ENCOUNTER (OUTPATIENT)
Dept: RADIOLOGY | Facility: HOSPITAL | Age: 49
Discharge: HOME | End: 2024-07-15
Payer: MEDICAID

## 2024-07-15 VITALS — HEIGHT: 62 IN | BODY MASS INDEX: 22.71 KG/M2

## 2024-07-15 DIAGNOSIS — Z12.31 BREAST CANCER SCREENING BY MAMMOGRAM: ICD-10-CM

## 2024-07-15 PROCEDURE — 77067 SCR MAMMO BI INCL CAD: CPT | Performed by: RADIOLOGY

## 2024-07-15 PROCEDURE — 77063 BREAST TOMOSYNTHESIS BI: CPT

## 2024-07-15 PROCEDURE — 77063 BREAST TOMOSYNTHESIS BI: CPT | Performed by: RADIOLOGY

## 2024-07-17 ENCOUNTER — APPOINTMENT (OUTPATIENT)
Dept: PRIMARY CARE | Facility: CLINIC | Age: 49
End: 2024-07-17
Payer: MEDICAID

## 2024-08-01 ENCOUNTER — PATIENT MESSAGE (OUTPATIENT)
Dept: PRIMARY CARE | Facility: CLINIC | Age: 49
End: 2024-08-01
Payer: MEDICAID

## 2024-08-01 DIAGNOSIS — J45.20 MILD INTERMITTENT ASTHMA WITHOUT COMPLICATION (HHS-HCC): ICD-10-CM

## 2024-08-01 DIAGNOSIS — F32.A ANXIETY AND DEPRESSION: ICD-10-CM

## 2024-08-01 DIAGNOSIS — G95.9 CERVICAL MYELOPATHY WITH CERVICAL RADICULOPATHY (MULTI): ICD-10-CM

## 2024-08-01 DIAGNOSIS — F41.9 ANXIETY AND DEPRESSION: ICD-10-CM

## 2024-08-01 DIAGNOSIS — K59.09 CHRONIC CONSTIPATION: ICD-10-CM

## 2024-08-01 DIAGNOSIS — K21.9 GASTROESOPHAGEAL REFLUX DISEASE WITHOUT ESOPHAGITIS: ICD-10-CM

## 2024-08-01 DIAGNOSIS — R11.2 NAUSEA AND VOMITING, UNSPECIFIED VOMITING TYPE: ICD-10-CM

## 2024-08-01 DIAGNOSIS — M54.12 CERVICAL MYELOPATHY WITH CERVICAL RADICULOPATHY (MULTI): ICD-10-CM

## 2024-08-02 ENCOUNTER — DOCUMENTATION (OUTPATIENT)
Dept: OCCUPATIONAL THERAPY | Facility: HOSPITAL | Age: 49
End: 2024-08-02
Payer: MEDICAID

## 2024-08-02 NOTE — PROGRESS NOTES
Occupational Therapy                 Therapy Communication Note    Patient Name: Cira Roberson  MRN: [id number]  Today's Date: 8/2/2024     Discipline: Occupational Therapy    Missed Time: No Show

## 2024-08-05 RX ORDER — GABAPENTIN 300 MG/1
300 CAPSULE ORAL 4 TIMES DAILY
Qty: 180 CAPSULE | Refills: 0 | Status: SHIPPED | OUTPATIENT
Start: 2024-08-05

## 2024-08-05 RX ORDER — FAMOTIDINE 20 MG/1
20 TABLET, FILM COATED ORAL DAILY
Qty: 30 TABLET | Refills: 0 | Status: SHIPPED | OUTPATIENT
Start: 2024-08-05

## 2024-08-05 RX ORDER — FLUTICASONE PROPIONATE 44 UG/1
1 AEROSOL, METERED RESPIRATORY (INHALATION)
Qty: 10.6 G | Refills: 0 | Status: SHIPPED | OUTPATIENT
Start: 2024-08-05

## 2024-08-05 RX ORDER — TIZANIDINE 4 MG/1
4 TABLET ORAL EVERY 6 HOURS PRN
Qty: 30 TABLET | Refills: 0 | Status: SHIPPED | OUTPATIENT
Start: 2024-08-05

## 2024-08-05 RX ORDER — ONDANSETRON 8 MG/1
TABLET, ORALLY DISINTEGRATING ORAL
Qty: 20 TABLET | Refills: 0 | Status: SHIPPED | OUTPATIENT
Start: 2024-08-05

## 2024-08-05 RX ORDER — PANTOPRAZOLE SODIUM 40 MG/1
40 TABLET, DELAYED RELEASE ORAL DAILY
Qty: 30 TABLET | Refills: 0 | Status: SHIPPED | OUTPATIENT
Start: 2024-08-05

## 2024-08-05 RX ORDER — LAMOTRIGINE 200 MG/1
200 TABLET ORAL NIGHTLY
Qty: 30 TABLET | Refills: 0 | Status: SHIPPED | OUTPATIENT
Start: 2024-08-05

## 2024-08-05 RX ORDER — ALBUTEROL SULFATE 90 UG/1
2 INHALANT RESPIRATORY (INHALATION) EVERY 4 HOURS PRN
Qty: 18 G | Refills: 0 | Status: SHIPPED | OUTPATIENT
Start: 2024-08-05

## 2024-08-05 RX ORDER — HYDROXYZINE HYDROCHLORIDE 10 MG/1
10 TABLET, FILM COATED ORAL 3 TIMES DAILY PRN
Qty: 90 TABLET | Refills: 0 | Status: SHIPPED | OUTPATIENT
Start: 2024-08-05

## 2024-08-12 ENCOUNTER — TELEPHONE (OUTPATIENT)
Dept: PRIMARY CARE | Facility: CLINIC | Age: 49
End: 2024-08-12
Payer: MEDICAID

## 2024-08-12 DIAGNOSIS — G95.9 CERVICAL MYELOPATHY WITH CERVICAL RADICULOPATHY (MULTI): Primary | ICD-10-CM

## 2024-08-12 DIAGNOSIS — Z98.1 STATUS POST CERVICAL SPINAL FUSION: ICD-10-CM

## 2024-08-12 DIAGNOSIS — M54.12 CERVICAL MYELOPATHY WITH CERVICAL RADICULOPATHY (MULTI): Primary | ICD-10-CM

## 2024-08-12 RX ORDER — ACETAMINOPHEN AND CODEINE PHOSPHATE 300; 30 MG/1; MG/1
1 TABLET ORAL EVERY 6 HOURS PRN
Qty: 5 TABLET | Refills: 0 | Status: SHIPPED | OUTPATIENT
Start: 2024-08-12 | End: 2024-08-15 | Stop reason: SDUPTHER

## 2024-08-12 NOTE — TELEPHONE ENCOUNTER
Please advise     Name: Cira Roberson  :  1975     Date of last appointment:  2024   Date of next appointment:  2024   Best number to reach patient:  [phone]

## 2024-08-12 NOTE — TELEPHONE ENCOUNTER
PATIENT CALLS REQUESTING TYLENOL #3, FOR BACK, LEG AND ARM PAIN  [facility] KNOWS HER PAIN HISTORY, ONLY NEEDS 5 PILLS  PLEASE ADVISE  LOV 7/9  NEW PATIENT  NEXT APPT 8/26

## 2024-08-14 ENCOUNTER — PATIENT MESSAGE (OUTPATIENT)
Dept: PRIMARY CARE | Facility: CLINIC | Age: 49
End: 2024-08-14
Payer: MEDICAID

## 2024-08-14 DIAGNOSIS — G95.9 CERVICAL MYELOPATHY WITH CERVICAL RADICULOPATHY (MULTI): ICD-10-CM

## 2024-08-14 DIAGNOSIS — Z98.1 STATUS POST CERVICAL SPINAL FUSION: ICD-10-CM

## 2024-08-14 DIAGNOSIS — M54.12 CERVICAL MYELOPATHY WITH CERVICAL RADICULOPATHY (MULTI): ICD-10-CM

## 2024-08-15 DIAGNOSIS — K21.9 GASTROESOPHAGEAL REFLUX DISEASE WITHOUT ESOPHAGITIS: ICD-10-CM

## 2024-08-15 RX ORDER — ACETAMINOPHEN AND CODEINE PHOSPHATE 300; 30 MG/1; MG/1
1 TABLET ORAL EVERY 6 HOURS PRN
Qty: 5 TABLET | Refills: 0 | Status: SHIPPED | OUTPATIENT
Start: 2024-08-15 | End: 2024-08-18

## 2024-08-22 ENCOUNTER — PATIENT MESSAGE (OUTPATIENT)
Dept: PRIMARY CARE | Facility: CLINIC | Age: 49
End: 2024-08-22
Payer: MEDICAID

## 2024-08-22 DIAGNOSIS — F43.10 PTSD (POST-TRAUMATIC STRESS DISORDER): Primary | ICD-10-CM

## 2024-08-23 RX ORDER — FLUOXETINE HYDROCHLORIDE 40 MG/1
40 CAPSULE ORAL DAILY
Qty: 90 CAPSULE | Refills: 1 | Status: SHIPPED | OUTPATIENT
Start: 2024-08-23

## 2024-08-23 RX ORDER — FLUOXETINE HYDROCHLORIDE 20 MG/1
20 CAPSULE ORAL DAILY
Qty: 90 CAPSULE | Refills: 1 | Status: SHIPPED | OUTPATIENT
Start: 2024-08-23

## 2024-08-26 ENCOUNTER — APPOINTMENT (OUTPATIENT)
Dept: PRIMARY CARE | Facility: CLINIC | Age: 49
End: 2024-08-26
Payer: MEDICAID

## 2024-08-26 VITALS
TEMPERATURE: 97.1 F | RESPIRATION RATE: 16 BRPM | BODY MASS INDEX: 22.26 KG/M2 | DIASTOLIC BLOOD PRESSURE: 60 MMHG | WEIGHT: 121 LBS | OXYGEN SATURATION: 96 % | HEIGHT: 62 IN | SYSTOLIC BLOOD PRESSURE: 102 MMHG | HEART RATE: 44 BPM

## 2024-08-26 DIAGNOSIS — E44.0 MODERATE PROTEIN-CALORIE MALNUTRITION (MULTI): Primary | ICD-10-CM

## 2024-08-26 DIAGNOSIS — R11.2 NAUSEA AND VOMITING, UNSPECIFIED VOMITING TYPE: ICD-10-CM

## 2024-08-26 DIAGNOSIS — K21.9 GASTROESOPHAGEAL REFLUX DISEASE WITHOUT ESOPHAGITIS: ICD-10-CM

## 2024-08-26 DIAGNOSIS — H81.09 MENIERE'S DISEASE, UNSPECIFIED LATERALITY: ICD-10-CM

## 2024-08-26 DIAGNOSIS — R65.10 SYSTEMIC INFLAMMATORY RESPONSE SYNDROME (MULTI): ICD-10-CM

## 2024-08-26 DIAGNOSIS — F43.10 PTSD (POST-TRAUMATIC STRESS DISORDER): ICD-10-CM

## 2024-08-26 DIAGNOSIS — J45.20 MILD INTERMITTENT ASTHMA WITHOUT COMPLICATION (HHS-HCC): ICD-10-CM

## 2024-08-26 PROCEDURE — 3008F BODY MASS INDEX DOCD: CPT | Performed by: FAMILY MEDICINE

## 2024-08-26 PROCEDURE — 99214 OFFICE O/P EST MOD 30 MIN: CPT | Performed by: FAMILY MEDICINE

## 2024-08-26 PROCEDURE — 1036F TOBACCO NON-USER: CPT | Performed by: FAMILY MEDICINE

## 2024-08-26 RX ORDER — NUT.SUP,SPEC,LAC-FREE,IRON/FOS 0.08G-2/ML
1 LIQUID (ML) ORAL 3 TIMES DAILY
Qty: 26640 ML | Refills: 2 | Status: SHIPPED | OUTPATIENT
Start: 2024-08-26 | End: 2024-09-25

## 2024-08-26 ASSESSMENT — PATIENT HEALTH QUESTIONNAIRE - PHQ9
2. FEELING DOWN, DEPRESSED OR HOPELESS: NOT AT ALL
SUM OF ALL RESPONSES TO PHQ9 QUESTIONS 1 AND 2: 0
1. LITTLE INTEREST OR PLEASURE IN DOING THINGS: NOT AT ALL

## 2024-08-26 NOTE — PROGRESS NOTES
Subjective   Patient ID: Cira Roberson is a 48 y.o. female who presents for Follow-up (/Mild intermittent asthma without complication ([facility]-H[facility])//Gastroesophageal reflux disease without esophagitis/). I last saw patient 7/9/2024    HPI       Patient is here for a F/U    Past medical, surgical, and family history reviewed.  Reviewed and documented all medications   Pt eating well, exercising as tolerated and taking medications as directed    Has no medical concerns for rooming MA    Patient request referrals to Psychiatrist and psychologist for PTSD in [facility].    Patient has requested a replacement for her food supplements. Her appetite is poor and is difficultly tolerating any foods.      Review of Systems    Except positives as noted in the CC & HPI      Constitutional: Denies fevers, chills, night sweats, fatigue, weight changes, change in appetite    Eyes: Denies blurry vision, double vision    ENT: Denies otalgia, trouble hearing, tinnitus, vertigo, nasal congestion, rhinorrhea, sore throat    Neck: Denies swelling, masses    Cardiovascular: Denies chest pain, palpitations, edema, orthopnea, syncope    Respiratory: Denies dyspnea, cough, wheezing, postural nocturnal dyspnea    Gastrointestinal: Denies abdominal pain, nausea, vomiting, diarrhea, constipation, melena, hematochezia    Genitourinary: Denies dysuria, hematuria, frequency, urgency    Musculoskeletal: Denies back pain, neck pain, arthralgias, myalgias    Integumentary: Denies skin lesions, rashes, masses    Neurological: Denies dizziness, headaches, confusion, limb weakness, paresthesias, syncope, convulsions    Psychiatric: Denies depression, anxiety, homicidal ideations, suicidal ideations, sleep disturbances    Endocrine: Denies polyphagia, polydipsia, polyuria, weakness, hair thinning, heat intolerance, cold intolerance, weight changes    Heme/Lymph: Denies easy bruising, easy bleeding, swollen glands     Objective   /60 (BP  "Location: Right arm, Patient Position: Sitting, BP Cuff Size: Adult long)   Pulse (!) 44   Temp 36.2 °C (97.1 °F)   Resp 16   Ht 1.58 m (5' 2.2\")   Wt 54.9 kg (121 lb)   SpO2 96%   BMI 21.99 kg/m²     Physical Exam    102/60 on recheck of BP in the right arm.     Gen. Appearance - well-developed, well-nourished, 48 y.o., White female in no acute distress.     Skin - warm, pink and dry without rash or concerning lesions.    Mental Status - alert and oriented times 3. Normal mood and affect appropriate to mood.     Neck - supple without lymphadenopathy. Carotid pulses are normal without bruits. Thyroid is normal in midline without nodules.    Chest - lungs are clear to auscultation without rales, rhonchi or wheezes.    Heart - regular, rate, and rhythm without murmurs, rubs or gallops.     Extremities - no cyanosis, clubbing or edema. Pedal pulses are 2+ normal at the dorsalis pedis and posterior tibial pulses bilaterally.     Neurological - cranial nerves II through XII are grossly intact. Motor strength 5/5 at all fours.     Assessment/Plan   1. Moderate protein-calorie malnutrition (Multi)  food supplemt, lactose-reduced (Ensure Complete) 0.1 gram- 1.18 kcal/mL liquid      2. Systemic inflammatory response syndrome (Multi)  ACTH    Arginine Vasopressin Hormone    Leptin    Vascular Endothelial Growth Factor    vasoactive intestinal peptide; [facility]; CPT 15737 - Miscellaneous Test    Cytokine Panel, Serum    Matrix Metalloproteinase-9 (MMP-9); [employer]; [id number] - Miscellaneous Test    Melanocyte-Stimulating Hormone,Alpha    Celiac Panel    C4 complement      3. PTSD (post-traumatic stress disorder)  Referral to Psychiatry    Referral to Psychology      4. Meniere's disease, unspecified laterality        5. Gastroesophageal reflux disease without esophagitis        6. Nausea and vomiting, unspecified vomiting type        7. Mild intermittent asthma without complication ([facility]-HCC)            Patient to continue " current medications (with any exceptions as noted) and diet. Follow-up in 3 month(s) otherwise as needed.      Will obtain CBC with differentials prior to patient's next appointment. Will call patient with results when available.     Recommended food supplement intake for her malnutrition.    Patient was referred to Dr. Simms (Psychiatry) and psychologist for PTSD.    Labs were placed for further evaluation of possibel Systemic Inflammatory Response Syndrome.    Scribe Attestation  By signing my name below, IClaudia Scribe   attest that this documentation has been prepared under the direction and in the presence of Oz Varela MD.

## 2024-08-27 ENCOUNTER — PATIENT MESSAGE (OUTPATIENT)
Dept: PRIMARY CARE | Facility: CLINIC | Age: 49
End: 2024-08-27
Payer: MEDICAID

## 2024-08-27 DIAGNOSIS — R63.4 WEIGHT LOSS: ICD-10-CM

## 2024-09-18 DIAGNOSIS — G95.9 CERVICAL MYELOPATHY WITH CERVICAL RADICULOPATHY: ICD-10-CM

## 2024-09-18 DIAGNOSIS — M54.12 CERVICAL MYELOPATHY WITH CERVICAL RADICULOPATHY: ICD-10-CM

## 2024-09-18 DIAGNOSIS — R63.4 WEIGHT LOSS: Primary | ICD-10-CM

## 2024-09-20 RX ORDER — FEEDING PUMP
EACH MISCELLANEOUS
Qty: 26640 ML | Refills: 2 | Status: SHIPPED | OUTPATIENT
Start: 2024-09-20

## 2024-09-25 RX ORDER — TIZANIDINE 4 MG/1
8 TABLET ORAL EVERY 8 HOURS PRN
Qty: 90 TABLET | Refills: 1 | Status: SHIPPED | OUTPATIENT
Start: 2024-09-25

## 2024-09-28 RX ORDER — MEDICAL SUPPLY, MISCELLANEOUS
296 EACH MISCELLANEOUS 3 TIMES DAILY
Qty: 26640 ML | Refills: 1 | Status: SHIPPED | OUTPATIENT
Start: 2024-09-28

## 2024-10-03 ENCOUNTER — LAB (OUTPATIENT)
Dept: LAB | Facility: LAB | Age: 49
End: 2024-10-03
Payer: MEDICAID

## 2024-10-03 DIAGNOSIS — R65.10 SYSTEMIC INFLAMMATORY RESPONSE SYNDROME (MULTI): ICD-10-CM

## 2024-10-03 DIAGNOSIS — K21.9 GASTROESOPHAGEAL REFLUX DISEASE WITHOUT ESOPHAGITIS: ICD-10-CM

## 2024-10-03 LAB
BASOPHILS # BLD AUTO: 0.05 X10*3/UL (ref 0–0.1)
BASOPHILS NFR BLD AUTO: 0.6 %
EOSINOPHIL # BLD AUTO: 0.06 X10*3/UL (ref 0–0.7)
EOSINOPHIL NFR BLD AUTO: 0.7 %
ERYTHROCYTE [DISTWIDTH] IN BLOOD BY AUTOMATED COUNT: 13.7 % (ref 11.5–14.5)
HCT VFR BLD AUTO: 37.8 % (ref 36–46)
HGB BLD-MCNC: 11.7 G/DL (ref 12–16)
IMM GRANULOCYTES # BLD AUTO: 0.02 X10*3/UL (ref 0–0.7)
IMM GRANULOCYTES NFR BLD AUTO: 0.2 % (ref 0–0.9)
LYMPHOCYTES # BLD AUTO: 2.27 X10*3/UL (ref 1.2–4.8)
LYMPHOCYTES NFR BLD AUTO: 27.2 %
MCH RBC QN AUTO: 28.4 PG (ref 26–34)
MCHC RBC AUTO-ENTMCNC: 31 G/DL (ref 32–36)
MCV RBC AUTO: 92 FL (ref 80–100)
MONOCYTES # BLD AUTO: 0.58 X10*3/UL (ref 0.1–1)
MONOCYTES NFR BLD AUTO: 6.9 %
NEUTROPHILS # BLD AUTO: 5.38 X10*3/UL (ref 1.2–7.7)
NEUTROPHILS NFR BLD AUTO: 64.4 %
NRBC BLD-RTO: 0 /100 WBCS (ref 0–0)
PLATELET # BLD AUTO: 296 X10*3/UL (ref 150–450)
RBC # BLD AUTO: 4.12 X10*6/UL (ref 4–5.2)
WBC # BLD AUTO: 8.4 X10*3/UL (ref 4.4–11.3)

## 2024-10-03 PROCEDURE — 83519 RIA NONANTIBODY: CPT

## 2024-10-03 PROCEDURE — 83520 IMMUNOASSAY QUANT NOS NONAB: CPT

## 2024-10-03 PROCEDURE — 82024 ASSAY OF ACTH: CPT

## 2024-10-03 PROCEDURE — 85025 COMPLETE CBC W/AUTO DIFF WBC: CPT

## 2024-10-03 PROCEDURE — 36415 COLL VENOUS BLD VENIPUNCTURE: CPT

## 2024-10-03 PROCEDURE — 84588 ASSAY OF VASOPRESSIN: CPT

## 2024-10-04 LAB
C4 SERPL-MCNC: 58 MG/DL (ref 10–50)
GLIADIN PEPTIDE IGA SER IA-ACNC: 3.5 U/ML
TTG IGA SER IA-ACNC: <1 U/ML

## 2024-10-05 LAB
ACTH PLAS-MCNC: 3.9 PG/ML (ref 7.2–63.3)
GLIADIN PEPTIDE IGG SER IA-ACNC: <0.56 FLU (ref 0–4.99)
LEPTIN SERPL-MCNC: 3.7 NG/ML (ref 0.5–15.2)
TTG IGG SER IA-ACNC: <0.82 FLU (ref 0–4.99)

## 2024-10-06 DIAGNOSIS — R79.89 LOW SERUM ADRENOCORTICOTROPHIC HORMONE (ACTH): Primary | ICD-10-CM

## 2024-10-06 LAB
A-TUMOR NECROSIS FACT SERPL-MCNC: <1.7 PG/ML
IGNF SERPL-MCNC: <4.2 PG/ML
IL10 SERPL-MCNC: <2.8 PG/ML
IL12 SERPL-MCNC: <1.9 PG/ML
IL13 SERPL-MCNC: <1.7 PG/ML
IL17A SERPL-MCNC: <1.4 PG/ML
IL2 SERPL-MCNC: <2.1 PG/ML
IL4 SERPL-MCNC: <2.2 PG/ML
IL5 SERPL-MCNC: <2.1 PG/ML
IL6 SERPL-MCNC: <2 PG/ML
IL8 SERPL-MCNC: <3 PG/ML
INTERLEUKIN 1 BETA: <6.5 PG/ML
SOL IL2 RECEP SERPL-MCNC: 253.1 PG/ML (ref 175.3–858.2)

## 2024-10-07 DIAGNOSIS — F32.A ANXIETY AND DEPRESSION: ICD-10-CM

## 2024-10-07 DIAGNOSIS — K59.09 CHRONIC CONSTIPATION: ICD-10-CM

## 2024-10-07 DIAGNOSIS — R13.10 DYSPHAGIA, UNSPECIFIED TYPE: ICD-10-CM

## 2024-10-07 DIAGNOSIS — J45.20 MILD INTERMITTENT ASTHMA WITHOUT COMPLICATION (HHS-HCC): ICD-10-CM

## 2024-10-07 DIAGNOSIS — R65.10 SYSTEMIC INFLAMMATORY RESPONSE SYNDROME (MULTI): ICD-10-CM

## 2024-10-07 DIAGNOSIS — G95.9 CERVICAL MYELOPATHY WITH CERVICAL RADICULOPATHY: ICD-10-CM

## 2024-10-07 DIAGNOSIS — M54.12 CERVICAL MYELOPATHY WITH CERVICAL RADICULOPATHY: ICD-10-CM

## 2024-10-07 DIAGNOSIS — K21.9 GASTROESOPHAGEAL REFLUX DISEASE WITHOUT ESOPHAGITIS: ICD-10-CM

## 2024-10-07 DIAGNOSIS — R63.4 WEIGHT LOSS: ICD-10-CM

## 2024-10-07 DIAGNOSIS — F41.9 ANXIETY AND DEPRESSION: ICD-10-CM

## 2024-10-07 RX ORDER — LACTOSE-REDUCED FOOD
237 LIQUID (ML) ORAL 3 TIMES DAILY
Qty: 26640 ML | Refills: 3 | Status: CANCELLED | OUTPATIENT
Start: 2024-10-07 | End: 2025-03-06

## 2024-10-07 NOTE — PROGRESS NOTES
Pt states her ins will only cover one certain type of Ensure. New rx pended. Please accept or decline.

## 2024-10-08 ENCOUNTER — TELEPHONE (OUTPATIENT)
Dept: PRIMARY CARE | Facility: CLINIC | Age: 49
End: 2024-10-08

## 2024-10-08 LAB — VEGF SERPL-MCNC: 35 PG/ML (ref 9–86)

## 2024-10-08 RX ORDER — HYDROXYZINE HYDROCHLORIDE 10 MG/1
10 TABLET, FILM COATED ORAL 3 TIMES DAILY PRN
Qty: 90 TABLET | Refills: 5 | Status: SHIPPED | OUTPATIENT
Start: 2024-10-08

## 2024-10-08 RX ORDER — FAMOTIDINE 20 MG/1
20 TABLET, FILM COATED ORAL DAILY
Qty: 30 TABLET | Refills: 5 | Status: SHIPPED | OUTPATIENT
Start: 2024-10-08

## 2024-10-08 RX ORDER — LAMOTRIGINE 200 MG/1
200 TABLET ORAL NIGHTLY
Qty: 30 TABLET | Refills: 5 | Status: SHIPPED | OUTPATIENT
Start: 2024-10-08

## 2024-10-08 RX ORDER — ALBUTEROL SULFATE 90 UG/1
2 INHALANT RESPIRATORY (INHALATION) EVERY 4 HOURS PRN
Qty: 18 G | Refills: 3 | Status: SHIPPED | OUTPATIENT
Start: 2024-10-08

## 2024-10-08 RX ORDER — FLUTICASONE PROPIONATE 44 UG/1
1 AEROSOL, METERED RESPIRATORY (INHALATION)
Qty: 10.6 G | Refills: 5 | Status: SHIPPED | OUTPATIENT
Start: 2024-10-08

## 2024-10-08 RX ORDER — GABAPENTIN 300 MG/1
300 CAPSULE ORAL 4 TIMES DAILY
Qty: 360 CAPSULE | Refills: 0 | Status: SHIPPED | OUTPATIENT
Start: 2024-10-08

## 2024-10-08 RX ORDER — PANTOPRAZOLE SODIUM 40 MG/1
40 TABLET, DELAYED RELEASE ORAL DAILY
Qty: 30 TABLET | Refills: 5 | Status: SHIPPED | OUTPATIENT
Start: 2024-10-08

## 2024-10-11 LAB — SCAN RESULT: NORMAL

## 2024-10-14 DIAGNOSIS — R79.89 ACTH ELEVATION: ICD-10-CM

## 2024-10-22 ENCOUNTER — LAB (OUTPATIENT)
Dept: LAB | Facility: LAB | Age: 49
End: 2024-10-22
Payer: MEDICAID

## 2024-10-22 DIAGNOSIS — R79.89 LOW SERUM ADRENOCORTICOTROPHIC HORMONE (ACTH): ICD-10-CM

## 2024-10-22 PROCEDURE — 36415 COLL VENOUS BLD VENIPUNCTURE: CPT

## 2024-10-22 PROCEDURE — 82533 TOTAL CORTISOL: CPT

## 2024-10-23 LAB — CORTIS SERPL-MCNC: 11.7 UG/DL (ref 2.5–20)

## 2024-10-25 LAB — A-MSH SERPL-MCNC: NORMAL PG/ML

## 2024-10-28 PROBLEM — R10.32 LEFT LOWER QUADRANT ABDOMINAL PAIN: Status: ACTIVE | Noted: 2024-10-28

## 2024-10-28 PROBLEM — R76.8 POSITIVE ANA (ANTINUCLEAR ANTIBODY): Status: ACTIVE | Noted: 2022-01-12

## 2024-10-28 PROBLEM — M79.7 FIBROMYALGIA: Status: ACTIVE | Noted: 2024-10-28

## 2024-10-28 PROBLEM — G43.909 MIGRAINES: Status: ACTIVE | Noted: 2024-10-28

## 2024-10-28 PROBLEM — I73.00 RAYNAUD PHENOMENON: Status: ACTIVE | Noted: 2022-01-12

## 2024-10-28 PROBLEM — G56.00 CARPAL TUNNEL SYNDROME: Status: ACTIVE | Noted: 2024-10-28

## 2024-10-28 PROBLEM — M54.6 PAIN IN THORACIC SPINE: Status: ACTIVE | Noted: 2018-03-17

## 2024-11-01 DIAGNOSIS — M54.12 CERVICAL MYELOPATHY WITH CERVICAL RADICULOPATHY: ICD-10-CM

## 2024-11-01 DIAGNOSIS — G95.9 CERVICAL MYELOPATHY WITH CERVICAL RADICULOPATHY: ICD-10-CM

## 2024-11-05 RX ORDER — TIZANIDINE 4 MG/1
8 TABLET ORAL EVERY 8 HOURS PRN
Qty: 180 TABLET | Refills: 0 | Status: SHIPPED | OUTPATIENT
Start: 2024-11-05
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
## 2024-11-12 ENCOUNTER — APPOINTMENT (OUTPATIENT)
Dept: GASTROENTEROLOGY | Facility: CLINIC | Age: 49
End: 2024-11-12
Payer: MEDICAID

## 2024-11-12 VITALS
RESPIRATION RATE: 18 BRPM | WEIGHT: 131 LBS | DIASTOLIC BLOOD PRESSURE: 62 MMHG | TEMPERATURE: 97.1 F | BODY MASS INDEX: 24.11 KG/M2 | SYSTOLIC BLOOD PRESSURE: 113 MMHG | HEART RATE: 83 BPM | HEIGHT: 62 IN

## 2024-11-12 DIAGNOSIS — K59.09 CHRONIC CONSTIPATION: Primary | ICD-10-CM

## 2024-11-12 DIAGNOSIS — K21.9 GASTROESOPHAGEAL REFLUX DISEASE WITHOUT ESOPHAGITIS: ICD-10-CM

## 2024-11-12 DIAGNOSIS — R11.14 BILIOUS VOMITING WITH NAUSEA: ICD-10-CM

## 2024-11-12 DIAGNOSIS — R10.32 ABDOMINAL PAIN, ACUTE, LEFT LOWER QUADRANT: ICD-10-CM

## 2024-11-12 DIAGNOSIS — K58.1 IRRITABLE BOWEL SYNDROME WITH CONSTIPATION: ICD-10-CM

## 2024-11-12 PROCEDURE — 99204 OFFICE O/P NEW MOD 45 MIN: CPT | Performed by: INTERNAL MEDICINE

## 2024-11-12 PROCEDURE — 3008F BODY MASS INDEX DOCD: CPT | Performed by: INTERNAL MEDICINE

## 2024-11-12 ASSESSMENT — ENCOUNTER SYMPTOMS
SHORTNESS OF BREATH: 0
WEIGHT LOSS: 0
BLOATING: 1
NAUSEA: 1
HEMATOCHEZIA: 0
VOMITING: 1
HEARTBURN: 1
WEIGHT GAIN: 1
COUGH: 0
CONSTIPATION: 1
ABDOMINAL PAIN: 1

## 2024-11-26 ENCOUNTER — APPOINTMENT (OUTPATIENT)
Dept: PRIMARY CARE | Facility: CLINIC | Age: 49
End: 2024-11-26
Payer: MEDICAID

## 2025-01-20 DIAGNOSIS — R11.2 NAUSEA AND VOMITING, UNSPECIFIED VOMITING TYPE: ICD-10-CM

## 2025-01-20 DIAGNOSIS — K21.9 GASTROESOPHAGEAL REFLUX DISEASE WITHOUT ESOPHAGITIS: ICD-10-CM

## 2025-01-20 RX ORDER — ONDANSETRON 8 MG/1
TABLET, ORALLY DISINTEGRATING ORAL
Qty: 20 TABLET | Refills: 0 | Status: SHIPPED | OUTPATIENT
Start: 2025-01-20

## 2025-01-20 NOTE — TELEPHONE ENCOUNTER
Rx Refill Request     Name: Cira ROE Roberson  :  1975   Medication Name:  ondansetron ODT (Zofran-ODT) 8 mg disintegrating tablet   Specific Pharmacy location:  [facility] [address] [place] [address]   Date of last appointment:  2024   Date of next appointment:  Visit date not found   Best number to reach patient:  [phone]

## 2025-01-30 ENCOUNTER — TELEPHONE (OUTPATIENT)
Dept: PRIMARY CARE | Facility: CLINIC | Age: 50
End: 2025-01-30
Payer: MEDICAID

## 2025-01-30 DIAGNOSIS — M71.20 SYNOVIAL CYST OF KNEE, UNSPECIFIED LATERALITY: ICD-10-CM

## 2025-01-30 DIAGNOSIS — G95.9 CERVICAL MYELOPATHY WITH CERVICAL RADICULOPATHY: Primary | ICD-10-CM

## 2025-01-30 DIAGNOSIS — M54.12 CERVICAL MYELOPATHY WITH CERVICAL RADICULOPATHY: Primary | ICD-10-CM

## 2025-01-30 RX ORDER — ACETAMINOPHEN AND CODEINE PHOSPHATE 300; 30 MG/1; MG/1
1 TABLET ORAL EVERY 6 HOURS PRN
Qty: 5 TABLET | Refills: 0 | Status: SHIPPED | OUTPATIENT
Start: 2025-01-30 | End: 2025-02-02

## 2025-01-30 NOTE — TELEPHONE ENCOUNTER
Rx Refill Request Telephone Encounter    Name:  Cira Roberson  :  [id number]  Medication Name:  acetaminophen-codeine (Tylenol w/ Codeine #3) 300-30 mg  Specific Pharmacy location:  [facility]  Date of last appointment:  2024  Date of next appointment:  2025  Best number to reach patient:  [phone]           Patient has a cyst in the back of her knee. She is needing this medication to help while she waits to get an US done

## 2025-01-31 NOTE — TELEPHONE ENCOUNTER
Got a return phone call from someone who is not this patient however the number put as the call back belongs to her 16yr old son and they live in [place]. Not patients at [facility]

## 2025-02-07 ENCOUNTER — TELEPHONE (OUTPATIENT)
Dept: GASTROENTEROLOGY | Facility: CLINIC | Age: 50
End: 2025-02-07
Payer: MEDICAID

## 2025-02-07 NOTE — TELEPHONE ENCOUNTER
Called patient to reschedule her appointment for 2/12/25 with Destini; provider will not be in the office that day due to family emergency. When the voicemail picked up it said that it was for Rubén. I left my name and said I was calling from [facility] and that I was trying to reach Cira. I left my contact information and asked them to return call if this was in fact a good contact number for her.

## 2025-02-12 ENCOUNTER — APPOINTMENT (OUTPATIENT)
Dept: GASTROENTEROLOGY | Facility: CLINIC | Age: 50
End: 2025-02-12
Payer: MEDICAID

## 2025-02-20 ENCOUNTER — APPOINTMENT (OUTPATIENT)
Dept: PRIMARY CARE | Facility: CLINIC | Age: 50
End: 2025-02-20
Payer: MEDICAID

## 2025-02-20 DIAGNOSIS — R13.12 OROPHARYNGEAL DYSPHAGIA: ICD-10-CM

## 2025-02-20 DIAGNOSIS — R65.10 SYSTEMIC INFLAMMATORY RESPONSE SYNDROME (MULTI): ICD-10-CM

## 2025-02-20 DIAGNOSIS — G95.9 CERVICAL MYELOPATHY WITH CERVICAL RADICULOPATHY: ICD-10-CM

## 2025-02-20 DIAGNOSIS — Z09 ENCOUNTER FOR FOLLOW-UP: Primary | ICD-10-CM

## 2025-02-20 DIAGNOSIS — E44.0 MODERATE PROTEIN-CALORIE MALNUTRITION (MULTI): ICD-10-CM

## 2025-02-20 DIAGNOSIS — Z12.11 COLON CANCER SCREENING: ICD-10-CM

## 2025-02-20 DIAGNOSIS — R79.89 LOW SERUM ADRENOCORTICOTROPHIC HORMONE (ACTH): ICD-10-CM

## 2025-02-20 DIAGNOSIS — G43.009 MIGRAINE WITHOUT AURA AND WITHOUT STATUS MIGRAINOSUS, NOT INTRACTABLE: ICD-10-CM

## 2025-02-20 DIAGNOSIS — Z13.220 SCREENING, LIPID: ICD-10-CM

## 2025-02-20 DIAGNOSIS — M54.12 CERVICAL MYELOPATHY WITH CERVICAL RADICULOPATHY: ICD-10-CM

## 2025-02-20 PROCEDURE — 99214 OFFICE O/P EST MOD 30 MIN: CPT | Performed by: FAMILY MEDICINE

## 2025-02-20 ASSESSMENT — ANXIETY QUESTIONNAIRES
GAD7 TOTAL SCORE: 21
2. NOT BEING ABLE TO STOP OR CONTROL WORRYING: NEARLY EVERY DAY
4. TROUBLE RELAXING: NEARLY EVERY DAY
7. FEELING AFRAID AS IF SOMETHING AWFUL MIGHT HAPPEN: NEARLY EVERY DAY
1. FEELING NERVOUS, ANXIOUS, OR ON EDGE: NEARLY EVERY DAY
5. BEING SO RESTLESS THAT IT IS HARD TO SIT STILL: NEARLY EVERY DAY
6. BECOMING EASILY ANNOYED OR IRRITABLE: NEARLY EVERY DAY
IF YOU CHECKED OFF ANY PROBLEMS ON THIS QUESTIONNAIRE, HOW DIFFICULT HAVE THESE PROBLEMS MADE IT FOR YOU TO DO YOUR WORK, TAKE CARE OF THINGS AT HOME, OR GET ALONG WITH OTHER PEOPLE: EXTREMELY DIFFICULT
3. WORRYING TOO MUCH ABOUT DIFFERENT THINGS: NEARLY EVERY DAY

## 2025-02-20 ASSESSMENT — ENCOUNTER SYMPTOMS
LOSS OF SENSATION IN FEET: 0
DEPRESSION: 0
OCCASIONAL FEELINGS OF UNSTEADINESS: 0

## 2025-02-20 ASSESSMENT — PATIENT HEALTH QUESTIONNAIRE - PHQ9
SUM OF ALL RESPONSES TO PHQ QUESTIONS 1-9: 25
9. THOUGHTS THAT YOU WOULD BE BETTER OFF DEAD, OR OF HURTING YOURSELF: SEVERAL DAYS
6. FEELING BAD ABOUT YOURSELF - OR THAT YOU ARE A FAILURE OR HAVE LET YOURSELF OR YOUR FAMILY DOWN: NEARLY EVERY DAY
10. IF YOU CHECKED OFF ANY PROBLEMS, HOW DIFFICULT HAVE THESE PROBLEMS MADE IT FOR YOU TO DO YOUR WORK, TAKE CARE OF THINGS AT HOME, OR GET ALONG WITH OTHER PEOPLE: EXTREMELY DIFFICULT
2. FEELING DOWN, DEPRESSED OR HOPELESS: NEARLY EVERY DAY
8. MOVING OR SPEAKING SO SLOWLY THAT OTHER PEOPLE COULD HAVE NOTICED. OR THE OPPOSITE, BEING SO FIGETY OR RESTLESS THAT YOU HAVE BEEN MOVING AROUND A LOT MORE THAN USUAL: NEARLY EVERY DAY
1. LITTLE INTEREST OR PLEASURE IN DOING THINGS: NEARLY EVERY DAY
4. FEELING TIRED OR HAVING LITTLE ENERGY: NEARLY EVERY DAY
3. TROUBLE FALLING OR STAYING ASLEEP: NEARLY EVERY DAY
SUM OF ALL RESPONSES TO PHQ9 QUESTIONS 1 & 2: 6
7. TROUBLE CONCENTRATING ON THINGS, SUCH AS READING THE NEWSPAPER OR WATCHING TELEVISION: NEARLY EVERY DAY
5. POOR APPETITE OR OVEREATING: NEARLY EVERY DAY

## 2025-02-20 NOTE — PROGRESS NOTES
Subjective   Patient ID: Cira Roberson is a 49 y.o. female who presents for Neck Pain. I last saw patient 8/26/2024    HPI   Patient would like to discuss her lab work today. She has some concerns about her blood work from October. 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 Since then she has been having problems with swallowing. She mentions that she will drink water or liquids and her throat feels like it closes up and prevents her from swallowing.     She would also like to discuss her neck pain.     Past medical, surgical, and family history reviewed.  Reviewed and documented all medications   Pt eating well, exercising as tolerated and taking medications as directed    The patient mentions that because of the holidays she was not able to make her appointment in November due to depression.    The patient mentions that she believes her neck pain is causing her migraines. She would like temporary treatment in the form of medication to help with it until she can come get treatment for her neck. She had previously taken medication on a temporary basis but has not used it since. She gets migraines a couple of times a month. In the past she also received injections in her back which stopped them.     The patient denies any changes in vision, hearing or dental.     The patient maintains they do not have any chest pain, chest tightness or shortness of breath.    They do not experience nausea, emesis, changes in bowel movements or dyspepsia.    The patient's vaccinations are up to date.      Review of Systems    Except positives as noted in the CC & HPI      Constitutional: Denies fevers, chills, night sweats, fatigue, weight changes, change in appetite    Eyes: Denies blurry vision, double vision    ENT: Denies otalgia, trouble hearing, tinnitus, vertigo, nasal congestion, rhinorrhea, sore throat    Neck: Denies swelling, masses     Cardiovascular: Denies chest pain, palpitations, edema, orthopnea, syncope    Respiratory: Denies dyspnea, cough, wheezing, postural nocturnal dyspnea    Gastrointestinal: Denies abdominal pain, nausea, vomiting, diarrhea, constipation, melena, hematochezia    Genitourinary: Denies dysuria, hematuria, frequency, urgency    Musculoskeletal: Denies back pain, neck pain, arthralgias, myalgias    Integumentary: Denies skin lesions, rashes, masses    Neurological: Denies dizziness, headaches, confusion, limb weakness, paresthesias, syncope, convulsions    Psychiatric: Denies depression, anxiety, homicidal ideations, suicidal ideations, sleep disturbances    Endocrine: Denies polyphagia, polydipsia, polyuria, weakness, hair thinning, heat intolerance, cold intolerance, weight changes    Heme/Lymph: Denies easy bruising, easy bleeding, swollen glands     Objective   There were no vitals taken for this visit.    Physical Exam    No physical exam done as it was a telephone visit.     Assessment/Plan   1. Encounter for follow-up        2. Migraine without aura and without status migrainosus, not intractable  rimegepant (Nurtec ODT) 75 mg tablet,disintegrating      3. Cervical myelopathy with cervical radiculopathy        4. Oropharyngeal dysphagia  FL modified barium swallow study    Referral to Gastroenterology      5. Low serum adrenocorticotrophic hormone (ACTH)  ACTH    Cortisol AM    ACTH    Cortisol AM      6. Moderate protein-calorie malnutrition (Multi)  Comprehensive metabolic panel    Comprehensive metabolic panel      7. Systemic inflammatory response syndrome (Multi)  CBC and Auto Differential    CBC and Auto Differential      8. Screening, lipid  Hemoglobin A1c    Hemoglobin A1c      9. Colon cancer screening  Referral to Gastroenterology      10. BMI 23.0-23.9, adult          For weight management I recommend exercising and remaining physically active. Keep walking,  exercising being active 30 minutes a day is  ideal. Try to maintain a heathy diet that includes green leafy vegetables, fruits and proteins. Try to limit alcohol consumption due to excess of sugars and calories which could hinder weight loss. Also try to limit nighttime snacking. Continue eating a heart healthy diet a good goal 5-7 servings of fresh fruit and vegetable every day along with lean protein avoid simple sugars avoid fast foods. You are encouraged to stay well hydrated.     Patient was advised to limit their salt intake and to not add any extra salt to their food. Patient is to avoid pretzels, chips, lunch meats, canned soups, soda pop, ham, almazan, hot dogs, etc.    Patient will continue on a diabetic, low-cholesterol diet and weight reduction. Exercise as tolerated.     I have ordered a colonoscopy for colon cancer screening to be done. Please have it done at your earliest convenience and I have ordered a modified barium swallow. Please have it done at your earliest convenience. In the interim I have placed a referral for gastrointestinal.     Will obtain cortisol, ACTH, CBC with Differential, Comprehensive Metabolic, and Hemoglobin A1c prior to patient's next appointment. Will call patient with results when available.    Patient was started on:   Rimegepent 75mg tablet. Dissolve 1 tablet (75 mg) in the mouth once daily as needed (Migraine).     Rx(s) sent to pharmacy.    Patient to continue current medications (with any exceptions as noted) and diet. Follow-up in 3 month(s) otherwise as needed.     Scribe Attestation  By signing my name below, IJulia, Ilir   attest that this documentation has been prepared under the direction and in the presence of Oz Varela MD.    This note has been transcribed using a medical scribe and there is a possibility of unintentional typing misprints.

## 2025-02-21 ENCOUNTER — TELEPHONE (OUTPATIENT)
Dept: PRIMARY CARE | Facility: CLINIC | Age: 50
End: 2025-02-21
Payer: MEDICAID

## 2025-02-21 NOTE — TELEPHONE ENCOUNTER
We received a request for prior authorization on the patient's rimegepant (Nurtec ODT) 75 mg tablet,disintegrating  from their pharmacy. Prior authorization was submitted to insurance today. We will await their determination.

## 2025-02-25 DIAGNOSIS — G43.009 MIGRAINE WITHOUT AURA AND WITHOUT STATUS MIGRAINOSUS, NOT INTRACTABLE: Primary | ICD-10-CM

## 2025-02-25 RX ORDER — RIZATRIPTAN BENZOATE 10 MG/1
10 TABLET, ORALLY DISINTEGRATING ORAL ONCE AS NEEDED
Qty: 9 TABLET | Refills: 1 | Status: SHIPPED | OUTPATIENT
Start: 2025-02-25 | End: 2025-03-27

## 2025-02-25 NOTE — TELEPHONE ENCOUNTER
Prior Authorization  has been  DENIED due to failure to try the step method.    Step method includes trial of:  Imitrex Nasal Spray, naratriptan tablets, rizatriptan (oral disintegrating tablets and tablets), and sumatriptan (injection, nasal spray, and tablets).     Please provide the patient with a covered alternative.    Pharmacy: [facility]    Denial letter scanned into media on 02.25.2025

## 2025-03-08 DIAGNOSIS — R11.2 NAUSEA AND VOMITING, UNSPECIFIED VOMITING TYPE: ICD-10-CM

## 2025-03-08 DIAGNOSIS — K21.9 GASTROESOPHAGEAL REFLUX DISEASE WITHOUT ESOPHAGITIS: Primary | ICD-10-CM

## 2025-03-10 RX ORDER — ONDANSETRON 8 MG/1
TABLET, ORALLY DISINTEGRATING ORAL
Qty: 20 TABLET | Refills: 0 | Status: SHIPPED | OUTPATIENT
Start: 2025-03-10

## 2025-03-27 DIAGNOSIS — R11.2 NAUSEA AND VOMITING, UNSPECIFIED VOMITING TYPE: ICD-10-CM

## 2025-03-27 DIAGNOSIS — G95.9 CERVICAL MYELOPATHY WITH CERVICAL RADICULOPATHY: ICD-10-CM

## 2025-03-27 DIAGNOSIS — K21.9 GASTROESOPHAGEAL REFLUX DISEASE WITHOUT ESOPHAGITIS: ICD-10-CM

## 2025-03-27 DIAGNOSIS — M54.12 CERVICAL MYELOPATHY WITH CERVICAL RADICULOPATHY: ICD-10-CM

## 2025-03-27 DIAGNOSIS — M71.20 SYNOVIAL CYST OF KNEE, UNSPECIFIED LATERALITY: ICD-10-CM

## 2025-03-27 NOTE — TELEPHONE ENCOUNTER
Rx Refill Request     Name: Cira Roberson  :  1975   Medication Name:    ondansetron ODT (Zofran-ODT) 8 mg disintegrating tablet    Last fill: 2025 20 day supply Q 20 R 0  2. acetaminophen-codeine (Tylenol w/ Codeine #3) 300-30 mg tablet    Last fill: 2025 Q 5 R 0  CSA: N/A   UDS: N/A   3. TiZANidine (Zanaflex) 4 mg tablet    Last fill: 2025 15 day supply Q 90 R 0  Specific Pharmacy location:  [facility]  Date of last appointment:  2024   Date of next appointment:  Visit date not found   Best number to reach patient:  [phone]       RX PENDED to [facility] as directed by Electronic Correspondence.

## 2025-03-28 RX ORDER — ONDANSETRON 8 MG/1
TABLET, ORALLY DISINTEGRATING ORAL
Qty: 20 TABLET | Refills: 0 | Status: SHIPPED | OUTPATIENT
Start: 2025-03-28

## 2025-03-28 RX ORDER — ACETAMINOPHEN AND CODEINE PHOSPHATE 300; 30 MG/1; MG/1
1 TABLET ORAL EVERY 6 HOURS PRN
Qty: 5 TABLET | Refills: 0 | Status: SHIPPED | OUTPATIENT
Start: 2025-03-28 | End: 2025-03-31

## 2025-03-28 RX ORDER — TIZANIDINE 4 MG/1
TABLET ORAL
Qty: 90 TABLET | Refills: 0 | Status: SHIPPED | OUTPATIENT
Start: 2025-03-28 | End: 2025-04-12

## 2025-04-04 DIAGNOSIS — M71.20 SYNOVIAL CYST OF KNEE, UNSPECIFIED LATERALITY: ICD-10-CM

## 2025-04-04 DIAGNOSIS — M54.12 CERVICAL MYELOPATHY WITH CERVICAL RADICULOPATHY: ICD-10-CM

## 2025-04-04 DIAGNOSIS — G95.9 CERVICAL MYELOPATHY WITH CERVICAL RADICULOPATHY: ICD-10-CM

## 2025-04-04 RX ORDER — ACETAMINOPHEN AND CODEINE PHOSPHATE 300; 30 MG/1; MG/1
1 TABLET ORAL EVERY 6 HOURS PRN
Qty: 5 TABLET | Refills: 0 | Status: SHIPPED | OUTPATIENT
Start: 2025-04-04 | End: 2025-04-07

## 2025-04-04 NOTE — TELEPHONE ENCOUNTER
Rx Refill Request     Name: Cira Roberson  :  1975   Medication Name:  TYLENOL #3  Specific Pharmacy location:  [facility]  Date of last appointment:  2024   Date of next appointment:  25   Best number to reach patient:  [phone]

## 2025-04-15 ENCOUNTER — APPOINTMENT (OUTPATIENT)
Dept: PRIMARY CARE | Facility: CLINIC | Age: 50
End: 2025-04-15
Payer: MEDICAID

## 2025-04-23 ENCOUNTER — APPOINTMENT (OUTPATIENT)
Dept: PRIMARY CARE | Facility: CLINIC | Age: 50
End: 2025-04-23
Payer: MEDICAID

## 2025-05-05 DIAGNOSIS — R11.2 NAUSEA AND VOMITING, UNSPECIFIED VOMITING TYPE: ICD-10-CM

## 2025-05-05 DIAGNOSIS — K04.7 DENTAL INFECTION: Primary | ICD-10-CM

## 2025-05-05 DIAGNOSIS — K21.9 GASTROESOPHAGEAL REFLUX DISEASE WITHOUT ESOPHAGITIS: ICD-10-CM

## 2025-05-05 RX ORDER — ACETAMINOPHEN AND CODEINE PHOSPHATE 300; 30 MG/1; MG/1
1 TABLET ORAL EVERY 6 HOURS PRN
Qty: 6 TABLET | Refills: 0 | Status: SHIPPED | OUTPATIENT
Start: 2025-05-05 | End: 2025-05-08

## 2025-05-05 RX ORDER — ONDANSETRON 8 MG/1
TABLET, ORALLY DISINTEGRATING ORAL
Qty: 20 TABLET | Refills: 0 | Status: SHIPPED | OUTPATIENT
Start: 2025-05-05
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
## 2025-05-08 NOTE — PROGRESS NOTES
Subjective   Patient ID: Cira Roberson is a 49 y.o. female who presents for Follow Up of Migraine, Cervical Myelopathy and Oropharyngeal Dysphagia . I last saw the patient on 2/20/2025  .     HPI  Patient has active labs in chart to be completed.     Past medical, surgical, and family history reviewed.  Reviewed and documented all medications   Pt eating well, exercising as tolerated and taking medications as directed.      Review of Systems  Except positives as noted in the CC & HPI      Constitutional: Denies fevers, chills, night sweats, fatigue, weight changes, change in appetite    Eyes: Denies blurry vision, double vision    ENT: Denies otalgia, trouble hearing, tinnitus, vertigo, nasal congestion, rhinorrhea, sore throat    Neck: Denies swelling, masses    Cardiovascular: Denies chest pain, palpitations, edema, orthopnea, syncope    Respiratory: Denies dyspnea, cough, wheezing, postural nocturnal dyspnea    Gastrointestinal: Denies abdominal pain, nausea, vomiting, diarrhea, constipation, melena, hematochezia    Genitourinary: Denies dysuria, hematuria  Musculoskeletal: Denies back pain, neck pain, arthralgias, myalgias    Integumentary: Denies skin lesions, rashes, masses    Neurological: Denies dizziness, headaches, confusion, limb weakness, paresthesias, syncope, convulsions    Psychiatric: Denies depression, anxiety, homicidal ideations, suicidal ideations, sleep disturbances    Endocrine: Denies polyphagia, polydipsia, polyuria, weakness, hair thinning, heat intolerance, cold intolerance, weight changes    Heme/Lymph: Denies easy bruising, easy bleeding, swollen glands    Objective   Visit Vitals  OB Status Hysterectomy   Smoking Status Some Days       Physical Exam    Gen. Appearance - well-developed, well-nourished in no acute distress.     Skin - warm and dry without rash or concerning lesions.     Mental Status - alert and oriented times 3. Normal mood and affect appropriate to mood.     Neck -  supple without lymphadenopathy. Carotid pulses are normal without bruits. Thyroid is normal in midline without nodules.    Chest - lungs are clear to auscultation without rales, rhonchi or wheezes.     Heart - regular, rate, and rhythm without murmurs, rubs or gallops.     Abdomen - soft, flat, nondistended, nontender . No masses, hepatomegaly or splenomegaly is noted. No rebound, rigidity or guarding is noted. Bowel sounds are normoactive.     Extremities - no cyanosis, clubbing. Pedal pulses are 2+ normal at the dorsalis pedis and posterior tibial pulses bilaterally.     Neurological - cranial nerves II through XII are grossly intact. Motor strength 5/5 at all fours.     Assessment   No diagnosis found.    Patient to continue current medications (with any exceptions as noted) and diet. Follow-up in *** month(s) otherwise as needed.            Scribe Attestation  By signing my name below, I, ***, Scribe   attest that this documentation has been prepared under the direction and in the presence of Oz Varela MD.     ODT (Zofran-ODT) 8 mg disintegrating tablet      13. Screening for colon cancer  Cologuard® colon cancer screening    Cologuard® colon cancer screening          Patient to continue current medications (with any exceptions as noted) and diet. Follow-up in 3 month(s) otherwise as needed.      Ordered MR brain w and wo IV contrast. Will call patient with results when available.      Patient was given refill(s) on:   Albuterol Sulfate HFA 90 mcg/act, INHALE 2 PUFFS EVERY 4-6 HOURS AS NEEDED  Fluoxetine HCl 20 mg, TAKE 1 CAPSULE BY MOUTH DAILY   Fluoxetine HCl 40 mg, TAKE 1 CAPSULE BY MOUTH DAILY   Famotidine 20 mg, TAKE 1 TAB BY MOUTH DAILY   Fluticasone Propionate 44 mcg/act, INSTILL 2 SPRAYS IN EACH NOSTRIL ONE HOUR PRIOR TO BEDTIME   Gabapentin 100 mg, TAKE 1 CAPSULE THREE TIMES DAILY  Hydroxyzine HCl 10 mg, TAKE 1 TAB BY MOUTH THREE TIMES DAILY AS NEEDED   Ondansetron 8 mg, DISSOLVE 1 TAB IN MOUTH EVERY DAY AS NEEDED  Pantoprazole Sodium 40 mg, TAKE 1 TAB BY MOUTH DAILY   Tizanidine HCl 4 mg, TAKE 1 TAB EVERY 8 HOURS AS NEEDED   lamoTRIgine (LaMICtal) 200 mg tablet, TAKE 1 TABLET (200 MG) BY MOUTH ONCE DAILY AT BEDTIME.     Rx(s) sent to pharmacy.      Patient was ordered a Cologuard test for colorectal cancer screening. Patient will be contacted by [employer] to verify home address, insurance, etc. Once the kit is received and completed, patient is to ship the kit back via [employer] on a Monday or Tuesday to ensure it is received by [email or web address] for processing in the appropriate time frame. Patient may drop off the completed kit at any UPS store or schedule a pick-up at their home, whichever is more convenient. Failure to ship on a Monday or Tuesday could cause their sample to become invalid for testing due to the delay.     Patient is to follow up with Dr. Machado (GI) and her psychiatrist as scheduled.     Scribe Attestation  By signing my name below, Claudia DUKE , Ilir   attest that this  documentation has been prepared under the direction and in the presence of Bradly Varela MD.      This note has been transcribed using a medical scribe and there is a possibility of unintentional typing misprints.     All medical record entries made by the scribe were at my direction and personally dictated by me. I have reviewed the chart and agree that the record accurately reflects my personal performance of the history, physical exam, discussion, and plan.     BRADLY VARELA M.D.

## 2025-05-12 ENCOUNTER — APPOINTMENT (OUTPATIENT)
Dept: PRIMARY CARE | Facility: CLINIC | Age: 50
End: 2025-05-12
Payer: MEDICAID

## 2025-05-12 DIAGNOSIS — M54.12 CERVICAL MYELOPATHY WITH CERVICAL RADICULOPATHY: ICD-10-CM

## 2025-05-12 DIAGNOSIS — R56.9 SEIZURES (MULTI): ICD-10-CM

## 2025-05-12 DIAGNOSIS — Z12.11 SCREENING FOR COLON CANCER: Primary | ICD-10-CM

## 2025-05-12 DIAGNOSIS — F43.10 PTSD (POST-TRAUMATIC STRESS DISORDER): ICD-10-CM

## 2025-05-12 DIAGNOSIS — R51.9 WORSENING HEADACHES: Primary | ICD-10-CM

## 2025-05-12 DIAGNOSIS — J45.20 MILD INTERMITTENT ASTHMA WITHOUT COMPLICATION (HHS-HCC): ICD-10-CM

## 2025-05-12 DIAGNOSIS — G95.9 CERVICAL MYELOPATHY WITH CERVICAL RADICULOPATHY: ICD-10-CM

## 2025-05-12 DIAGNOSIS — G43.009 MIGRAINE WITHOUT AURA AND WITHOUT STATUS MIGRAINOSUS, NOT INTRACTABLE: ICD-10-CM

## 2025-05-12 DIAGNOSIS — F32.A ANXIETY AND DEPRESSION: ICD-10-CM

## 2025-05-12 DIAGNOSIS — R11.2 NAUSEA AND VOMITING, UNSPECIFIED VOMITING TYPE: ICD-10-CM

## 2025-05-12 DIAGNOSIS — F41.9 ANXIETY AND DEPRESSION: ICD-10-CM

## 2025-05-12 DIAGNOSIS — R20.2 PARESTHESIA OF RIGHT UPPER AND LOWER EXTREMITY: ICD-10-CM

## 2025-05-12 DIAGNOSIS — R13.12 OROPHARYNGEAL DYSPHAGIA: ICD-10-CM

## 2025-05-12 DIAGNOSIS — K21.9 GASTROESOPHAGEAL REFLUX DISEASE WITHOUT ESOPHAGITIS: ICD-10-CM

## 2025-05-12 DIAGNOSIS — K59.09 CHRONIC CONSTIPATION: ICD-10-CM

## 2025-05-12 DIAGNOSIS — Z12.11 SCREENING FOR COLON CANCER: ICD-10-CM

## 2025-05-12 PROCEDURE — 1036F TOBACCO NON-USER: CPT | Performed by: FAMILY MEDICINE

## 2025-05-12 PROCEDURE — 99214 OFFICE O/P EST MOD 30 MIN: CPT | Performed by: FAMILY MEDICINE

## 2025-05-12 RX ORDER — FLUOXETINE HYDROCHLORIDE 20 MG/1
20 CAPSULE ORAL DAILY
Qty: 90 CAPSULE | Refills: 1 | Status: SHIPPED | OUTPATIENT
Start: 2025-05-12

## 2025-05-12 RX ORDER — FLUOXETINE HYDROCHLORIDE 40 MG/1
40 CAPSULE ORAL DAILY
Qty: 90 CAPSULE | Refills: 1 | Status: SHIPPED | OUTPATIENT
Start: 2025-05-12

## 2025-05-12 RX ORDER — PANTOPRAZOLE SODIUM 40 MG/1
40 TABLET, DELAYED RELEASE ORAL DAILY
Qty: 30 TABLET | Refills: 5 | Status: SHIPPED | OUTPATIENT
Start: 2025-05-12

## 2025-05-12 RX ORDER — FLUTICASONE PROPIONATE 44 UG/1
1 AEROSOL, METERED RESPIRATORY (INHALATION)
Qty: 10.6 G | Refills: 5 | Status: SHIPPED | OUTPATIENT
Start: 2025-05-12

## 2025-05-12 RX ORDER — HYDROXYZINE HYDROCHLORIDE 10 MG/1
10 TABLET, FILM COATED ORAL 3 TIMES DAILY PRN
Qty: 90 TABLET | Refills: 5 | Status: SHIPPED | OUTPATIENT
Start: 2025-05-12

## 2025-05-12 RX ORDER — FAMOTIDINE 20 MG/1
20 TABLET, FILM COATED ORAL DAILY
Qty: 30 TABLET | Refills: 5 | Status: SHIPPED | OUTPATIENT
Start: 2025-05-12

## 2025-05-12 RX ORDER — ALBUTEROL SULFATE 90 UG/1
2 INHALANT RESPIRATORY (INHALATION) EVERY 4 HOURS PRN
Qty: 18 G | Refills: 3 | Status: SHIPPED | OUTPATIENT
Start: 2025-05-12

## 2025-05-12 RX ORDER — LAMOTRIGINE 200 MG/1
200 TABLET ORAL NIGHTLY
Qty: 90 TABLET | Refills: 1 | Status: SHIPPED | OUTPATIENT
Start: 2025-05-12 | End: 2026-05-12

## 2025-05-12 RX ORDER — GABAPENTIN 300 MG/1
300 CAPSULE ORAL 4 TIMES DAILY
Qty: 360 CAPSULE | Refills: 0 | Status: SHIPPED | OUTPATIENT
Start: 2025-05-12

## 2025-05-12 RX ORDER — TIZANIDINE 4 MG/1
TABLET ORAL
Qty: 90 TABLET | Refills: 0 | Status: SHIPPED | OUTPATIENT
Start: 2025-05-12

## 2025-05-12 RX ORDER — ONDANSETRON 8 MG/1
TABLET, ORALLY DISINTEGRATING ORAL
Qty: 20 TABLET | Refills: 0 | Status: SHIPPED | OUTPATIENT
Start: 2025-05-12

## 2025-05-12 ASSESSMENT — PATIENT HEALTH QUESTIONNAIRE - PHQ9
SUM OF ALL RESPONSES TO PHQ QUESTIONS 1-9: 24
6. FEELING BAD ABOUT YOURSELF - OR THAT YOU ARE A FAILURE OR HAVE LET YOURSELF OR YOUR FAMILY DOWN: NEARLY EVERY DAY
10. IF YOU CHECKED OFF ANY PROBLEMS, HOW DIFFICULT HAVE THESE PROBLEMS MADE IT FOR YOU TO DO YOUR WORK, TAKE CARE OF THINGS AT HOME, OR GET ALONG WITH OTHER PEOPLE: EXTREMELY DIFFICULT
3. TROUBLE FALLING OR STAYING ASLEEP: NEARLY EVERY DAY
5. POOR APPETITE OR OVEREATING: NEARLY EVERY DAY
8. MOVING OR SPEAKING SO SLOWLY THAT OTHER PEOPLE COULD HAVE NOTICED. OR THE OPPOSITE, BEING SO FIGETY OR RESTLESS THAT YOU HAVE BEEN MOVING AROUND A LOT MORE THAN USUAL: NEARLY EVERY DAY
1. LITTLE INTEREST OR PLEASURE IN DOING THINGS: NEARLY EVERY DAY
9. THOUGHTS THAT YOU WOULD BE BETTER OFF DEAD, OR OF HURTING YOURSELF: NOT AT ALL
7. TROUBLE CONCENTRATING ON THINGS, SUCH AS READING THE NEWSPAPER OR WATCHING TELEVISION: NEARLY EVERY DAY
2. FEELING DOWN, DEPRESSED OR HOPELESS: NEARLY EVERY DAY
4. FEELING TIRED OR HAVING LITTLE ENERGY: NEARLY EVERY DAY
SUM OF ALL RESPONSES TO PHQ9 QUESTIONS 1 & 2: 6

## 2025-05-12 ASSESSMENT — ANXIETY QUESTIONNAIRES
5. BEING SO RESTLESS THAT IT IS HARD TO SIT STILL: NEARLY EVERY DAY
2. NOT BEING ABLE TO STOP OR CONTROL WORRYING: NEARLY EVERY DAY
IF YOU CHECKED OFF ANY PROBLEMS ON THIS QUESTIONNAIRE, HOW DIFFICULT HAVE THESE PROBLEMS MADE IT FOR YOU TO DO YOUR WORK, TAKE CARE OF THINGS AT HOME, OR GET ALONG WITH OTHER PEOPLE: EXTREMELY DIFFICULT
GAD7 TOTAL SCORE: 21
7. FEELING AFRAID AS IF SOMETHING AWFUL MIGHT HAPPEN: NEARLY EVERY DAY
6. BECOMING EASILY ANNOYED OR IRRITABLE: NEARLY EVERY DAY
4. TROUBLE RELAXING: NEARLY EVERY DAY
1. FEELING NERVOUS, ANXIOUS, OR ON EDGE: NEARLY EVERY DAY
3. WORRYING TOO MUCH ABOUT DIFFERENT THINGS: NEARLY EVERY DAY

## 2025-05-12 ASSESSMENT — ENCOUNTER SYMPTOMS
DEPRESSION: 0
OCCASIONAL FEELINGS OF UNSTEADINESS: 1
LOSS OF SENSATION IN FEET: 0

## 2025-05-13 NOTE — ASSESSMENT & PLAN NOTE
Seizures are stable.  Patient to continue with current medications and treatment plan.  Follow-up at least annually.

## 2025-05-19 NOTE — PATIENT INSTRUCTIONS
Patient Education        Back Stretches: Exercises  Your Care Instructions  Here are some examples of exercises for stretching your back. Start each exercise slowly. Ease off the exercise if you start to have pain. Your doctor or physical therapist will tell you when you can start these exercises and which ones will work best for you. How to do the exercises  Overhead stretch    1. Stand comfortably with your feet shoulder-width apart. 2. Looking straight ahead, raise both arms over your head and reach toward the ceiling. Do not allow your head to tilt back. 3. Hold for 15 to 30 seconds, then lower your arms to your sides. 4. Repeat 2 to 4 times. Side stretch    1. Stand comfortably with your feet shoulder-width apart. 2. Raise one arm over your head, and then lean to the other side. 3. Slide your hand down your leg as you let the weight of your arm gently stretch your side muscles. Hold for 15 to 30 seconds. 4. Repeat 2 to 4 times on each side. Press-up    1. Lie on your stomach, supporting your body with your forearms. 2. Press your elbows down into the floor to raise your upper back. As you do this, relax your stomach muscles and allow your back to arch without using your back muscles. As your press up, do not let your hips or pelvis come off the floor. 3. Hold for 15 to 30 seconds, then relax. 4. Repeat 2 to 4 times. Relax and rest    1. Lie on your back with a rolled towel under your neck and a pillow under your knees. Extend your arms comfortably to your sides. 2. Relax and breathe normally. 3. Remain in this position for about 10 minutes. 4. If you can, do this 2 or 3 times each day. Follow-up care is a key part of your treatment and safety. Be sure to make and go to all appointments, and call your doctor if you are having problems. It's also a good idea to know your test results and keep a list of the medicines you take. Where can you learn more?   Go to Quality 226: Preventive Care And Screening: Tobacco Use: Screening And Cessation Intervention: Patient screened for tobacco use and is an ex/non-smoker Detail Level: Detailed https://chpepiceweb.kaufDA. org and sign in to your [employer] account. Enter R898 in the [employer] box to learn more about \"Back Stretches: Exercises. \"     If you do not have an account, please click on the \"Sign Up Now\" link. Current as of: March 21, 2017  Content Version: 11.3  © 0972-7800 [employer]. Care instructions adapted under license by [facility] ([facility]). If you have questions about a medical condition or this instruction, always ask your healthcare professional. [address] any warranty or liability for your use of this information. Patient Education        Hip Bursitis: Care Instructions  Your Care Instructions    Bursitis is inflammation of the bursa. A bursa is a small sac of fluid that cushions a joint and helps it move easily. A bursa sits between a bone in the hip and the muscles and tendons in the thigh and buttock. Injury or overuse of the hip can cause bursitis. Activities that can lead to bursitis include twisting and rapid joint movement. Bursitis can cause hip pain. Bursitis usually gets better if you avoid the activity that caused it. If pain lasts or gets worse despite home treatment, your doctor may draw fluid from the bursa through a needle. This may relieve your pain and help your doctor know if you have an infection. If so, your doctor will prescribe antibiotics. If you have inflammation only, you may get a corticosteroid shot to reduce swelling and pain. Sometimes surgery is needed to drain or remove the bursa. Follow-up care is a key part of your treatment and safety. Be sure to make and go to all appointments, and call your doctor if you are having problems. It's also a good idea to know your test results and keep a list of the medicines you take. How can you care for yourself at home? · Put ice or a cold pack on your hip for 10 to 20 minutes at a time. Put a thin cloth between the ice and your skin.   · After 3 days of using ice, you may use heat on your hip. You can use a hot water bottle, a heating pad set on low, or a warm, moist towel. · Rest your hip. Stop any activities that cause pain. Switch to activities that do not stress your hip. · Take your medicines exactly as prescribed. Call your doctor if you think you are having a problem with your medicine. · Ask your doctor if you can take an over-the-counter pain medicine, such as acetaminophen (Tylenol), ibuprofen (Advil, Motrin), or naproxen (Aleve). Be safe with medicines. Read and follow all instructions on the label. · To prevent stiffness, gently move the hip joint as much as you can without pain every day. As the pain gets better, keep doing range-of-motion exercises. Ask your doctor for exercises that will make the muscles around the hip joint stronger. Do these as directed. · You can slowly return to the activity that caused the pain, but do it with less effort until you can do it without pain or swelling. Be sure to warm up before and stretch after you do the activity. When should you call for help? Call your doctor now or seek immediate medical care if:  · You have a fever. · You have increased swelling or redness in your hip. · You cannot use your hip, or the pain in your hip gets worse. Watch closely for changes in your health, and be sure to contact your doctor if:  · You have pain for 2 weeks or longer despite home treatment. Where can you learn more? Go to https://Uzabasemanda.[employer]. org and sign in to your [employer] account. Enter N979 in the [facility] box to learn more about \"Hip Bursitis: Care Instructions. \"     If you do not have an account, please click on the \"Sign Up Now\" link. Current as of: March 21, 2017  Content Version: 11.3  © 5467-3546 [employer]. Care instructions adapted under license by [facility] ([facility]).  If you have questions about a medical condition or this instruction, always ask your healthcare professional. [address] any warranty or liability for your use of this information. Patient Education        Hip Bursitis: Exercises  Your Care Instructions  Here are some examples of typical rehabilitation exercises for your condition. Start each exercise slowly. Ease off the exercise if you start to have pain. Your doctor or physical therapist will tell you when you can start these exercises and which ones will work best for you. How to do the exercises  Hip rotator stretch    5. Lie on your back with both knees bent and your feet flat on the floor. 6. Put the ankle of your affected leg on your opposite thigh near your knee. 7. Use your hand to gently push your knee away from your body until you feel a gentle stretch around your hip. 8. Hold the stretch for 15 to 30 seconds. 9. Repeat 2 to 4 times. 10. Repeat steps 1 through 5, but this time use your hand to gently pull your knee toward your opposite shoulder. Iliotibial band stretch    5. Lean sideways against a wall. If you are not steady on your feet, hold on to a chair or counter. 6. Stand on the leg with the affected hip, with that leg close to the wall. Then cross your other leg in front of it. 7. Let your affected hip drop out to the side of your body and against wall. Then lean away from your affected hip until you feel a stretch. 8. Hold the stretch for 15 to 30 seconds. 9. Repeat 2 to 4 times. Straight-leg raises to the outside    5. Lie on your side, with your affected hip on top. 6. Tighten the front thigh muscles of your top leg to keep your knee straight. 7. Keep your hip and your leg straight in line with the rest of your body, and keep your knee pointing forward. Do not drop your hip back. 8. Lift your top leg straight up toward the ceiling, about 12 inches off the floor. Hold for about 6 seconds, then slowly lower your leg. 9. Repeat 8 to 12 times. Clamshell    5.  Lie on your side, with your

## 2025-05-22 ENCOUNTER — HOSPITAL ENCOUNTER (OUTPATIENT)
Dept: RADIOLOGY | Facility: HOSPITAL | Age: 50
End: 2025-05-22
Payer: MEDICAID

## 2025-05-22 DIAGNOSIS — R51.9 WORSENING HEADACHES: ICD-10-CM

## 2025-05-22 DIAGNOSIS — G43.009 MIGRAINE WITHOUT AURA AND WITHOUT STATUS MIGRAINOSUS, NOT INTRACTABLE: ICD-10-CM

## 2025-05-22 DIAGNOSIS — R20.2 PARESTHESIA OF RIGHT UPPER AND LOWER EXTREMITY: ICD-10-CM

## 2025-05-22 PROCEDURE — 70553 MRI BRAIN STEM W/O & W/DYE: CPT

## 2025-05-22 PROCEDURE — A9575 INJ GADOTERATE MEGLUMI 0.1ML: HCPCS | Mod: JZ | Performed by: FAMILY MEDICINE

## 2025-05-22 PROCEDURE — 2550000001 HC RX 255 CONTRASTS: Mod: JZ | Performed by: FAMILY MEDICINE

## 2025-05-22 RX ORDER — GADOTERATE MEGLUMINE 376.9 MG/ML
12 INJECTION INTRAVENOUS
Status: COMPLETED | OUTPATIENT
Start: 2025-05-22 | End: 2025-05-22

## 2025-05-22 RX ADMIN — GADOTERATE MEGLUMINE 20 ML: 376.9 INJECTION INTRAVENOUS at 18:16

## 2025-05-28 ENCOUNTER — HOSPITAL ENCOUNTER (OUTPATIENT)
Dept: RADIOLOGY | Facility: HOSPITAL | Age: 50
Discharge: HOME | End: 2025-05-28
Payer: MEDICAID

## 2025-05-28 DIAGNOSIS — R13.12 OROPHARYNGEAL DYSPHAGIA: ICD-10-CM

## 2025-05-28 PROCEDURE — 92611 MOTION FLUOROSCOPY/SWALLOW: CPT | Mod: GN

## 2025-05-28 PROCEDURE — 74230 X-RAY XM SWLNG FUNCJ C+: CPT

## 2025-05-28 PROCEDURE — 2500000005 HC RX 250 GENERAL PHARMACY W/O HCPCS: Performed by: FAMILY MEDICINE

## 2025-05-28 PROCEDURE — 74230 X-RAY XM SWLNG FUNCJ C+: CPT | Performed by: RADIOLOGY

## 2025-05-28 RX ADMIN — BARIUM SULFATE 5 ML: 400 SUSPENSION ORAL at 10:38

## 2025-05-28 RX ADMIN — BARIUM SULFATE 10 ML: 400 PASTE ORAL at 10:36

## 2025-05-28 RX ADMIN — BARIUM SULFATE 90 ML: 400 SUSPENSION ORAL at 10:37

## 2025-05-28 RX ADMIN — BARIUM SULFATE 90 ML: 0.81 POWDER, FOR SUSPENSION ORAL at 10:38

## 2025-05-28 NOTE — PROGRESS NOTES
"Speech-Language Pathology    Adult Outpatient Modified Barium Swallow Study    Patient Name: Cira Roberson  MRN: [id number]  : 1975  Today's Date: 25    Time Calculation  Start Time: 1015  Stop Time: 1054  Total Time (min): 39 minutes     Modified Barium Swallow Study completed. Informed verbal consent obtained prior to completion of exam. The study was completed per protocol with various liquid barium consistencies, pudding, solids and a 13mm barium tablet. A 1.9 cm or .75 inch (outer diameter) ring was placed on the chin in the lateral view and on the lateral, left side of the neck in the a-p view in order to complete objective measurements during swallowing. The anatomic structures and function of the oropharynx, larynx, hypopharynx and cervical esophagus were evaluated.    SLP: Adelina Starks SLP   Contact info: [employer] secure chat      Reason for Referral: oropharyngeal dysphagia  Patient Hx: PMHX: anxiety and depression, asthma, bilateral SNHL, gastritis, GERD, meniere disease, seizures. Cira Roberson reported difficulty with swallowing with all consistencies. She reported she aspirates on water sometimes. She had an MBSS completed in  which reported no aspiration/penetration with thin liquids, nectar thick liquids, medication whole with water, puree, mechanical soft or sandwich. Cira Roberson reported her epiglottis does not work properly and she has esophageal immotility. She reported that food feels stuck at the base of her throat. She often feels like her throat is tightening up, especially with foods that \"swell with liquids\" such as bread or mashed potatoes. She reported her appetite \"comes and goes,\" she takes small bites and eats slowly. She has adequate natural dentition for mastication. She currently vapes, does not consume alcohol, and takes 1 caffeine pill a day. She drinks 8-10 bottles of water per day. She has no known food allergies and no previous therapy for swallowing. When " her swallowing difficulties began she was 147 pouneds and has now lost 17-22 pounds.     Respiratory Status: Room air  Current diet: Regular textures (IDDSI level 7) and Thin liquids (IDDSI level 0)    Pain:  Pain Scale: 0-10  Ratin      Fall Risk: No      Based on the results of the MBSS, the following diet is recommended with the implementation of the specified safe swallow strategies:    DIET RECOMMENDATION   Solids: Regular textures (IDDSI level 7)  Liquids: Thin liquids (IDDSI level 0)    STRATEGIES:  - Small bites  - Small, single sips  - Alternate consistencies  - Add moisture to dry foods  - Upright for all PO intake  - Reflux Precautions  - Medications whole in puree or as best tolerated      Plan:  Treatment/Interventions: Patient/caregiver education  SLP Plan: Skilled SLP warranted  SLP Frequency: 1x per week   Duration: 1 week  Reason: 1 follow up appointment to review MBSS study    Discussed POC: Patient  Discussed Risks/Benefits: Yes  Patient/Caregiver Agreeable: Yes    Dysphagia Goal(s):  Short Term Goal(s):  In 2 weeks...  Cira will review the MBSS with SLP and be provided further education about the study and swallow safety.     STATUS: Goal established    PROGRESS: TBD   Goal Start: 25  Anticipated End: 25   Goal End:      Patient's Goal for Therapy: to understand her swallowing    Education Provided: Results and recommendations per MBSS. Verbal understanding and agreement given.    Additional Medical Consults Suggested:   N/A    Repeat study/ dc plan: Determined by physician or treating speech-language pathologist if appropriate.        Mechanics of the Swallow Summary:  ORAL PHASE:  Lip Closure - No labial escape/anterior loss of bolus   Tongue Control During Bolus Hold - Cohesive bolus between tongue to palatal seal   Bolus prep/mastication - Timely and efficient mastication skills   Bolus transport/lingual motion - Delayed initiation of tongue motion for A-P movement of the bolus    Oral residue - Trace residue lining oral structures     PHARYNGEAL PHASE:  Initiation of pharyngeal swallow - Bolus head at vallecular pit   Soft palate elevation - No bolus between soft palate/pharyngeal wall   Laryngeal elevation - Complete superior movement of thyroid cartilage with contact of arytenoids to epiglottic petiole   Anterior hyoid excursion - Complete anterior movement   Epiglottic movement - Partial inversion  Laryngeal vestibule closure - Incomplete - narrow column of air/contrast in laryngeal vestibule   Pharyngeal stripping wave - Complete  Pharyngeal contraction (A/P view) - Complete  Pharyngoesophageal segment opening - Complete distension and complete duration/no obstruction of flow of bolus   Tongue base retraction - No bolus between tongue base and posterior pharyngeal wall   Pharyngeal residue - Trace residue within or on the pharyngeal structures     ESOPHAGEAL PHASE:  Esophageal clearance - Esophageal retention with retrograde flow below the pharyngoesophageal segment       SLP Impressions with Severity Rating:   Pt presents with within functional limits (WFL) swallowing upon completion of modified barium swallow study this date. Swallowing physiology is detailed above.     Impairments most impacting swallowing safety and efficiency include penetration that enters the airway above the vocal folds and is ejected during large cup sips of liquid and consecutive sips via straw. Cira Roberson complained of globus sensation at the base of her neck when the esophageal retention with some retrograde flow below the UES was noted.     Patient demonstrated trace oral and pharyngeal residue that was reduced with multiple swallows.    *Of note: The A-P bolus follow-through is not intended to be utilized as a diagnostic assessment of the esophagus, rather a tool to observe the biomechanic aspects of the swallow continuum and to inform the need for further evaluation by medical specialists, as  applicable.     Strategies attempted:  -Effortful swallow      OUTCOME MEASURES:  Functional Oral Intake Scale  Functional Oral Intake Scale: Level 7        total oral diet with no restrictions       Eating Assessment Tool (EAT-10)   0 = No problem, 1 = Mild problem, 2 = Mild to moderate problem, 3 = Moderate problem, 4 = Severe problem    EAT 10  My swallowing problem has caused me to lose weight.: 3  My swallowing problem interferes with my ability to go out for meals.: 4  Swallowing liquids takes extra effort.: 3  Swallowing solids takes extra effort.: 4  Swallowing pills takes extra effort.: 2  Swallowing is painful: 2  The pleasure of eating is affected by my swallowing.: 4  When I swallow food sticks in my throat.: 4  I cough when I eat.: 3  Swallowing is stressful: 3  EAT-10 TOTAL SCORE:: 32    A total score of 3 or above may indicate difficulty with swallowing safely and/or efficiently    Rosenbek's Penetration Aspiration Scale  Thin Liquids: 2. PENETRATION that CLEARS - contrast enter airway, above vocal cords, no residue  Nectar Thick Liquids: 2. PENETRATION that CLEARS - contrast enter airway, above vocal cords, no residue  Honey Thick Liquids: 2. PENETRATION that CLEARS - contrast enter airway, above vocal cords, no residue  Puree: 2. PENETRATION that CLEARS - contrast enter airway, above vocal cords, no residue  Soft Solids: 2. PENETRATION that CLEARS - contrast enter airway, above vocal cords, no residue  Solids: 1. NO ASPIRATION & NO PENETRATION - no aspiration, contrast does not enter airway

## 2025-05-30 DIAGNOSIS — M54.12 CERVICAL MYELOPATHY WITH CERVICAL RADICULOPATHY: ICD-10-CM

## 2025-05-30 DIAGNOSIS — G43.009 MIGRAINE WITHOUT AURA AND WITHOUT STATUS MIGRAINOSUS, NOT INTRACTABLE: ICD-10-CM

## 2025-05-30 DIAGNOSIS — K59.09 CHRONIC CONSTIPATION: ICD-10-CM

## 2025-05-30 DIAGNOSIS — K21.9 GASTROESOPHAGEAL REFLUX DISEASE WITHOUT ESOPHAGITIS: ICD-10-CM

## 2025-05-30 DIAGNOSIS — G95.9 CERVICAL MYELOPATHY WITH CERVICAL RADICULOPATHY: ICD-10-CM

## 2025-05-30 DIAGNOSIS — R11.2 NAUSEA AND VOMITING, UNSPECIFIED VOMITING TYPE: ICD-10-CM

## 2025-05-30 DIAGNOSIS — J45.20 MILD INTERMITTENT ASTHMA WITHOUT COMPLICATION (HHS-HCC): ICD-10-CM

## 2025-05-30 NOTE — TELEPHONE ENCOUNTER
Rx Refill Request     Name: Cira ROE Roberson  :  1975     Date of last appointment:  2024   Date of next appointment:  Visit date not found   Best number to reach patient:  [phone]

## 2025-06-01 RX ORDER — LAMOTRIGINE 200 MG/1
TABLET ORAL
Qty: 90 TABLET | Refills: 3 | Status: SHIPPED | OUTPATIENT
Start: 2025-06-01

## 2025-06-01 RX ORDER — TIZANIDINE 4 MG/1
TABLET ORAL
Qty: 540 TABLET | Refills: 3 | Status: SHIPPED | OUTPATIENT
Start: 2025-06-01

## 2025-06-01 RX ORDER — ONDANSETRON 8 MG/1
TABLET, ORALLY DISINTEGRATING ORAL
Qty: 90 TABLET | Refills: 3 | Status: SHIPPED | OUTPATIENT
Start: 2025-06-01

## 2025-06-01 RX ORDER — ALBUTEROL SULFATE 90 UG/1
INHALANT RESPIRATORY (INHALATION)
Qty: 18 G | Refills: 11 | Status: SHIPPED | OUTPATIENT
Start: 2025-06-01

## 2025-06-01 RX ORDER — PANTOPRAZOLE SODIUM 40 MG/1
TABLET, DELAYED RELEASE ORAL
Qty: 90 TABLET | Refills: 3 | Status: SHIPPED | OUTPATIENT
Start: 2025-06-01

## 2025-06-01 RX ORDER — RIZATRIPTAN BENZOATE 10 MG/1
TABLET, ORALLY DISINTEGRATING ORAL
Qty: 18 TABLET | Refills: 3 | Status: SHIPPED | OUTPATIENT
Start: 2025-06-01

## 2025-06-12 ENCOUNTER — APPOINTMENT (OUTPATIENT)
Dept: RADIOLOGY | Facility: HOSPITAL | Age: 50
End: 2025-06-12
Payer: MEDICAID

## 2025-06-12 ENCOUNTER — APPOINTMENT (OUTPATIENT)
Dept: GASTROENTEROLOGY | Facility: CLINIC | Age: 50
End: 2025-06-12
Payer: MEDICAID

## 2025-06-12 VITALS
WEIGHT: 125.6 LBS | OXYGEN SATURATION: 96 % | HEART RATE: 75 BPM | BODY MASS INDEX: 23.11 KG/M2 | DIASTOLIC BLOOD PRESSURE: 64 MMHG | SYSTOLIC BLOOD PRESSURE: 99 MMHG | HEIGHT: 62 IN

## 2025-06-12 DIAGNOSIS — Z12.11 COLON CANCER SCREENING: ICD-10-CM

## 2025-06-12 DIAGNOSIS — K22.4 ESOPHAGEAL DYSMOTILITY: ICD-10-CM

## 2025-06-12 DIAGNOSIS — K21.9 GASTROESOPHAGEAL REFLUX DISEASE WITHOUT ESOPHAGITIS: Primary | ICD-10-CM

## 2025-06-12 DIAGNOSIS — R13.12 OROPHARYNGEAL DYSPHAGIA: ICD-10-CM

## 2025-06-12 PROCEDURE — 3008F BODY MASS INDEX DOCD: CPT | Performed by: INTERNAL MEDICINE

## 2025-06-12 PROCEDURE — 99213 OFFICE O/P EST LOW 20 MIN: CPT | Performed by: INTERNAL MEDICINE

## 2025-06-12 PROCEDURE — 1036F TOBACCO NON-USER: CPT | Performed by: INTERNAL MEDICINE

## 2025-06-12 RX ORDER — DILTIAZEM HYDROCHLORIDE 30 MG/1
30 TABLET, FILM COATED ORAL 3 TIMES DAILY
Qty: 90 TABLET | Refills: 3 | Status: SHIPPED | OUTPATIENT
Start: 2025-06-12 | End: 2026-06-12

## 2025-06-12 ASSESSMENT — ENCOUNTER SYMPTOMS
HEMATOLOGIC/LYMPHATIC NEGATIVE: 1
NEUROLOGICAL NEGATIVE: 1
NAUSEA: 1
ABDOMINAL PAIN: 1
RESPIRATORY NEGATIVE: 1
EYES NEGATIVE: 1
CARDIOVASCULAR NEGATIVE: 1
CONSTITUTIONAL NEGATIVE: 1
ENDOCRINE NEGATIVE: 1

## 2025-06-12 NOTE — PROGRESS NOTES
Subjective   Patient ID: Cira Roberson is a 49 y.o. female who presents for New Patient Visit (Pt presents today as a new patient with complaints of dysphagia. Pt states that symptoms started before she was septic and has gotten worse since. Pt states it feels like there is constantly something stuck in her throat, but she can feel it in her abdominal area. Pt states that this not only happens with food, but happens with liquid as well. At times she has aspirated on water and other times it feels like her tongue goes to the roof of her mouth preventing her from swallowing. Pt states that taking) and Other (Medication is also difficult and she has to have a large amount of water to swallow it. Pt had a swallow study completed which showed that things slow down when they reach her stomach. Pt was also advised by PCP to mention she has a cologuard test at home and was told to wait until seeing us before she completed it. ).    HPI 49-year-old female with longstanding history of esophageal dysmotility multiple endoscopies last performed 2-1/2 years ago findings of erosive esophagitis.  Admits that food sticks in midesophagus worse with bread rice chicken.  Does have issues with cold food has still hold cold liquids in her mouth to warm them protocols intense esophageal discomfort described as a spasm.  This occurs with the above-mentioned foods as well often has to vomit undigested food.  Admits to 20 pound weight loss because of dysphagia.      Review of Systems   Constitutional: Negative.    HENT: Negative.     Eyes: Negative.    Respiratory: Negative.     Cardiovascular: Negative.    Gastrointestinal:  Positive for abdominal pain and nausea.   Endocrine: Negative.    Genitourinary: Negative.    Neurological: Negative.    Hematological: Negative.        Objective   Physical Exam  Constitutional:       General: She is awake.      Appearance: Normal appearance.   HENT:      Head: Normocephalic and atraumatic.       Nose: Nose normal.      Mouth/Throat:      Mouth: Mucous membranes are moist.   Eyes:      Pupils: Pupils are equal, round, and reactive to light.   Neck:      Thyroid: No thyroid mass.      Trachea: Phonation normal.   Cardiovascular:      Rate and Rhythm: Normal rate and regular rhythm.      Heart sounds: Normal heart sounds.   Pulmonary:      Effort: Pulmonary effort is normal. No respiratory distress.      Breath sounds: Normal air entry. No decreased breath sounds, wheezing, rhonchi or rales.   Abdominal:      General: Bowel sounds are normal. There is no distension.      Palpations: Abdomen is soft.      Tenderness: There is no abdominal tenderness.   Musculoskeletal:      Cervical back: Neck supple.      Right lower leg: No edema.      Left lower leg: No edema.   Skin:     General: Skin is warm.      Capillary Refill: Capillary refill takes less than 2 seconds.   Neurological:      General: No focal deficit present.      Mental Status: She is alert and oriented to person, place, and time. Mental status is at baseline.      Cranial Nerves: Cranial nerves 2-12 are intact.      Motor: Motor function is intact.   Psychiatric:         Attention and Perception: Attention and perception normal.         Mood and Affect: Mood normal.         Speech: Speech normal.         Behavior: Behavior normal.         Assessment/Plan   Diagnoses and all orders for this visit:  Gastroesophageal reflux disease without esophagitis  Oropharyngeal dysphagia  -     Referral to Gastroenterology  -     Esophagogastroduodenoscopy (EGD) w Botox Injection; Future  Colon cancer screening  -     Referral to Gastroenterology  Esophageal dysmotility       Based on recent esophagram symptoms of dysphagia match esophageal dysmotility likely diffuse after spasm.  Given her longstanding dysphagia may represent achalasia.  No biopsies been taken to exclude EOE.  Recommend repeat endoscopy for biopsy and possible Botox injection findings of  achalasia.  Recommend she begin a therapeutic trial of Cardizem 30 mg 3 times daily.  Discussed treatment of constipation to begin over-the-counter MiraLAX 17 g daily.  Increase dietary fiber and water.  Has had normal colonoscopies last colonoscopy was approximately 6 years ago [place].  States positive family history of Crohn's but no symptoms support same at this time.  Was told in the past she had colitis but never offered treatment.    Follow-up after endoscopy.    Prabhu Machado DO 06/12/25 2:31 PM

## 2025-06-17 ENCOUNTER — TELEPHONE (OUTPATIENT)
Dept: GASTROENTEROLOGY | Facility: CLINIC | Age: 50
End: 2025-06-17
Payer: MEDICAID

## 2025-06-17 NOTE — TELEPHONE ENCOUNTER
Pt called and said that she thinks she's having a reaction to the diltiazam. Pt states that about 30 minutes after taking the medication she begins to have severe stomach pain and a burning sensation from her throat to her stomach. Pt states this has happened each time she's taken it. How would you like to proceed?  32 mins  MERRITT Machado, DO  have her stop the medication.  29 mins  Ok! Anything you want her to start in it's place?   28 mins  MERRITT Machado, DO  lets wait on any additional therapy until the EGD is completed

## 2025-06-20 ENCOUNTER — HOSPITAL ENCOUNTER (OUTPATIENT)
Dept: OPERATING ROOM | Facility: HOSPITAL | Age: 50
Setting detail: OUTPATIENT SURGERY
Discharge: HOME | End: 2025-06-20
Payer: MEDICAID

## 2025-06-20 VITALS
TEMPERATURE: 97.3 F | WEIGHT: 125.66 LBS | OXYGEN SATURATION: 97 % | HEIGHT: 62 IN | BODY MASS INDEX: 23.12 KG/M2 | SYSTOLIC BLOOD PRESSURE: 90 MMHG | HEART RATE: 50 BPM | RESPIRATION RATE: 20 BRPM | DIASTOLIC BLOOD PRESSURE: 63 MMHG

## 2025-06-20 DIAGNOSIS — R13.12 OROPHARYNGEAL DYSPHAGIA: ICD-10-CM

## 2025-06-20 PROCEDURE — 7100000009 HC PHASE TWO TIME - INITIAL BASE CHARGE: Performed by: INTERNAL MEDICINE

## 2025-06-20 PROCEDURE — 2720000007 HC OR 272 NO HCPCS: Performed by: INTERNAL MEDICINE

## 2025-06-20 PROCEDURE — 7100000010 HC PHASE TWO TIME - EACH INCREMENTAL 1 MINUTE: Performed by: INTERNAL MEDICINE

## 2025-06-20 PROCEDURE — 2500000005 HC RX 250 GENERAL PHARMACY W/O HCPCS: Performed by: INTERNAL MEDICINE

## 2025-06-20 PROCEDURE — 3600000007 HC OR TIME - EACH INCREMENTAL 1 MINUTE - PROCEDURE LEVEL TWO: Performed by: INTERNAL MEDICINE

## 2025-06-20 PROCEDURE — 3600000002 HC OR TIME - INITIAL BASE CHARGE - PROCEDURE LEVEL TWO: Performed by: INTERNAL MEDICINE

## 2025-06-20 PROCEDURE — 3700000012 HC SEDATION LEVEL 5+ TIME - INITIAL 15 MINUTES 5/> YEARS: Performed by: INTERNAL MEDICINE

## 2025-06-20 PROCEDURE — 2500000004 HC RX 250 GENERAL PHARMACY W/ HCPCS (ALT 636 FOR OP/ED): Mod: JZ | Performed by: INTERNAL MEDICINE

## 2025-06-20 RX ORDER — FENTANYL CITRATE 50 UG/ML
INJECTION, SOLUTION INTRAMUSCULAR; INTRAVENOUS AS NEEDED
Status: COMPLETED | OUTPATIENT
Start: 2025-06-20 | End: 2025-06-20

## 2025-06-20 RX ORDER — SODIUM CHLORIDE, SODIUM LACTATE, POTASSIUM CHLORIDE, CALCIUM CHLORIDE 600; 310; 30; 20 MG/100ML; MG/100ML; MG/100ML; MG/100ML
50 INJECTION, SOLUTION INTRAVENOUS CONTINUOUS
Status: DISCONTINUED | OUTPATIENT
Start: 2025-06-20 | End: 2025-06-21 | Stop reason: HOSPADM

## 2025-06-20 RX ORDER — MIDAZOLAM HYDROCHLORIDE 5 MG/ML
INJECTION, SOLUTION INTRAMUSCULAR; INTRAVENOUS AS NEEDED
Status: COMPLETED | OUTPATIENT
Start: 2025-06-20 | End: 2025-06-20

## 2025-06-20 RX ADMIN — ONABOTULINUMTOXINA 100 UNITS: 100 INJECTION, POWDER, LYOPHILIZED, FOR SOLUTION INTRADERMAL; INTRAMUSCULAR at 13:10

## 2025-06-20 RX ADMIN — FENTANYL CITRATE 25 MCG: 50 INJECTION, SOLUTION INTRAMUSCULAR; INTRAVENOUS at 13:09

## 2025-06-20 RX ADMIN — FENTANYL CITRATE 50 MCG: 50 INJECTION, SOLUTION INTRAMUSCULAR; INTRAVENOUS at 13:02

## 2025-06-20 RX ADMIN — BENZOCAINE, BUTAMBEN, AND TETRACAINE HYDROCHLORIDE 2 SPRAY: .028; .004; .004 AEROSOL, SPRAY TOPICAL at 12:59

## 2025-06-20 RX ADMIN — SODIUM CHLORIDE, SODIUM LACTATE, POTASSIUM CHLORIDE, AND CALCIUM CHLORIDE 50 ML/HR: .6; .31; .03; .02 INJECTION, SOLUTION INTRAVENOUS at 12:32

## 2025-06-20 RX ADMIN — MIDAZOLAM HYDROCHLORIDE 2.5 MG: 5 INJECTION, SOLUTION INTRAMUSCULAR; INTRAVENOUS at 13:01

## 2025-06-20 ASSESSMENT — COLUMBIA-SUICIDE SEVERITY RATING SCALE - C-SSRS
6. HAVE YOU EVER DONE ANYTHING, STARTED TO DO ANYTHING, OR PREPARED TO DO ANYTHING TO END YOUR LIFE?: NO
1. IN THE PAST MONTH, HAVE YOU WISHED YOU WERE DEAD OR WISHED YOU COULD GO TO SLEEP AND NOT WAKE UP?: NO
2. HAVE YOU ACTUALLY HAD ANY THOUGHTS OF KILLING YOURSELF?: NO

## 2025-06-20 ASSESSMENT — PAIN - FUNCTIONAL ASSESSMENT
PAIN_FUNCTIONAL_ASSESSMENT: 0-10

## 2025-06-20 ASSESSMENT — PAIN SCALES - GENERAL
PAINLEVEL_OUTOF10: 0 - NO PAIN
PAINLEVEL_OUTOF10: 0 - NO PAIN
PAINLEVEL_OUTOF10: 4
PAINLEVEL_OUTOF10: 0 - NO PAIN

## 2025-06-26 LAB
LABORATORY COMMENT REPORT: NORMAL
PATH REPORT.FINAL DX SPEC: NORMAL
PATH REPORT.GROSS SPEC: NORMAL
PATH REPORT.RELEVANT HX SPEC: NORMAL
PATH REPORT.TOTAL CANCER: NORMAL

## 2025-06-30 ENCOUNTER — TELEPHONE (OUTPATIENT)
Dept: GASTROENTEROLOGY | Facility: CLINIC | Age: 50
End: 2025-06-30
Payer: MEDICAID

## 2025-06-30 NOTE — TELEPHONE ENCOUNTER
PATIENT NOTIFIED AND VERBALIZED UNDERSTANDING     Scheduled 8/7/25    ----- Message from Prabhu Machado sent at 6/30/2025  9:06 AM EDT -----  Regarding: FW:  Gastritis no h pylori or Mendoza’s. No EoE. Will likely need appointment  ----- Message -----  From: Lab, Background User  Sent: 6/26/2025  11:24 AM EDT  To: Prabhu Machado, DO

## 2025-07-01 DIAGNOSIS — M54.12 CERVICAL MYELOPATHY WITH CERVICAL RADICULOPATHY: ICD-10-CM

## 2025-07-01 DIAGNOSIS — G95.9 CERVICAL MYELOPATHY WITH CERVICAL RADICULOPATHY: ICD-10-CM

## 2025-07-02 RX ORDER — GABAPENTIN 300 MG/1
300 CAPSULE ORAL 4 TIMES DAILY
Qty: 240 CAPSULE | Refills: 5 | Status: SHIPPED | OUTPATIENT
Start: 2025-07-02

## 2025-07-28 DIAGNOSIS — M54.12 CERVICAL MYELOPATHY WITH CERVICAL RADICULOPATHY: Primary | ICD-10-CM

## 2025-07-28 DIAGNOSIS — G95.9 CERVICAL MYELOPATHY WITH CERVICAL RADICULOPATHY: Primary | ICD-10-CM

## 2025-07-28 NOTE — TELEPHONE ENCOUNTER
Paperwork is in regards to her applying for SSI due to her Disability.     Please advise patient has her hearing soon.

## 2025-07-28 NOTE — TELEPHONE ENCOUNTER
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 She needs answer by today so she van have her  fax the paperwork over. She has her hearing on 8/6/25

## 2025-07-28 NOTE — TELEPHONE ENCOUNTER
Rx Refill Request Telephone Encounter    Name:  Cira Roberson  : 1975     Medication Name:    acetaminophen-codeine (TYLENOL #3) 300-30 MG per tablet  Take 1 (one) tablet by mouth 2 (two) times daily as needed for Pain. as needed for pain. 20 tablet            ALLERGIES:   see list    Specific Pharmacy location:    [place] [address]  [address], [facility] [address]  Phone: [phone]  Fax: [phone]  ANA PAULA #: --       Date of last appointment:  25 virtual  Date of next appointment:      Best number to reach patient:  [phone]

## 2025-07-29 RX ORDER — ACETAMINOPHEN AND CODEINE PHOSPHATE 300; 30 MG/1; MG/1
1 TABLET ORAL EVERY 6 HOURS PRN
Qty: 6 TABLET | Refills: 0 | Status: SHIPPED | OUTPATIENT
Start: 2025-07-29 | End: 2025-08-05

## 2025-07-29 NOTE — TELEPHONE ENCOUNTER
Patient aware and stated she will have the paperwork faxed to the office. She is requesting the acetaminophen-codeine (TYLENOL #3) 300-30 MG be sent in ASAP  Please Advise

## 2025-08-01 ENCOUNTER — TELEPHONE (OUTPATIENT)
Dept: PRIMARY CARE | Facility: CLINIC | Age: 50
End: 2025-08-01
Payer: MEDICAID

## 2025-08-01 NOTE — TELEPHONE ENCOUNTER
Patient was made aware that an appointment is needed to complete forms. She lives 1[place]5 hours away and refused to schedule an appointment. Her hearing is on Tues and  Is out of the office. She will make her  aware that forms will not be completed.

## 2025-08-01 NOTE — TELEPHONE ENCOUNTER
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 He is unable to do this without an in office appointment- not a telemedicine visit.  She can be placed on the schedule when he returns from vacation were available. If she is unable to make an appointment we can fax her records but the form will not be completed. Please call the patient and schedule her.       Thank you

## 2025-08-07 ENCOUNTER — APPOINTMENT (OUTPATIENT)
Dept: GASTROENTEROLOGY | Facility: CLINIC | Age: 50
End: 2025-08-07
Payer: MEDICAID

## 2025-08-07 VITALS
HEART RATE: 83 BPM | DIASTOLIC BLOOD PRESSURE: 68 MMHG | HEIGHT: 62 IN | OXYGEN SATURATION: 95 % | SYSTOLIC BLOOD PRESSURE: 101 MMHG | WEIGHT: 122.4 LBS | BODY MASS INDEX: 22.52 KG/M2

## 2025-08-07 DIAGNOSIS — R20.0 NUMBNESS AND TINGLING IN BOTH HANDS: ICD-10-CM

## 2025-08-07 DIAGNOSIS — S46.911S STRAIN OF RIGHT SHOULDER, SEQUELA: ICD-10-CM

## 2025-08-07 DIAGNOSIS — K25.9 GASTRIC ULCER, UNSPECIFIED CHRONICITY, UNSPECIFIED WHETHER GASTRIC ULCER HEMORRHAGE OR PERFORATION PRESENT: ICD-10-CM

## 2025-08-07 DIAGNOSIS — K58.0 IRRITABLE BOWEL SYNDROME WITH DIARRHEA: ICD-10-CM

## 2025-08-07 DIAGNOSIS — K22.4 ESOPHAGEAL DYSMOTILITY: Primary | ICD-10-CM

## 2025-08-07 DIAGNOSIS — R76.8 POSITIVE ANA (ANTINUCLEAR ANTIBODY): Primary | ICD-10-CM

## 2025-08-07 DIAGNOSIS — Z86.19 HISTORY OF LYME DISEASE: ICD-10-CM

## 2025-08-07 DIAGNOSIS — R20.2 NUMBNESS AND TINGLING IN BOTH HANDS: ICD-10-CM

## 2025-08-07 DIAGNOSIS — R13.10 DYSPHAGIA, UNSPECIFIED TYPE: ICD-10-CM

## 2025-08-07 DIAGNOSIS — M25.511 PAIN IN JOINT OF RIGHT SHOULDER: ICD-10-CM

## 2025-08-07 PROBLEM — M79.18: Status: ACTIVE | Noted: 2025-08-07

## 2025-08-07 PROBLEM — M79.10 MYALGIA: Status: ACTIVE | Noted: 2025-08-07

## 2025-08-07 PROCEDURE — 3008F BODY MASS INDEX DOCD: CPT | Performed by: INTERNAL MEDICINE

## 2025-08-07 PROCEDURE — 99214 OFFICE O/P EST MOD 30 MIN: CPT | Performed by: INTERNAL MEDICINE

## 2025-08-07 ASSESSMENT — ENCOUNTER SYMPTOMS
FEVER: 0
DIFFICULTY URINATING: 0
ABDOMINAL DISTENTION: 0
ABDOMINAL PAIN: 0
COUGH: 0
LIGHT-HEADEDNESS: 0
NAUSEA: 0
SHORTNESS OF BREATH: 0
ARTHRALGIAS: 0
CHILLS: 0
VOMITING: 0
CONSTIPATION: 0
WHEEZING: 0
COLOR CHANGE: 0
TROUBLE SWALLOWING: 0
JOINT SWELLING: 0
SPEECH DIFFICULTY: 0
CONFUSION: 0
UNEXPECTED WEIGHT CHANGE: 0
SLEEP DISTURBANCE: 0
HEADACHES: 0
DIARRHEA: 0
BLOOD IN STOOL: 0
DIZZINESS: 0

## 2025-08-07 NOTE — PROGRESS NOTES
Subjective   Patient ID: Cira Roberson is a 49 y.o. female who presents for Follow-up (Pt presents today as a follow up to discuss recent EGD performed on 06/20/25. Pt still has complaints of trouble swallowing, stating it happens with food, liquid, and medication. Pt states it also causes her stomach upset for about 30 minutes after meal consumption. ).    HPI patient is seen today in follow-up.  Upper endoscopy was completed no evidence of EOE on biopsies.  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    She is eating better is able to eat more semisolid food still cannot eat any meats especially ground moods as a feels they do not pass.    Upper endoscopy did reveal severe erosive esophagitis with ulcerations throughout the distal esophagus.  Biopsies again showed no Mendoza's.  She is scheduled for repeat endoscopy in 4 weeks.    She admits she has disability hearing scheduled.  She has had remote history of Lyme disease although no lab testing could be identified intra-articular from same states was diagnosed in 2004 while living in [facility].  She did have some rheumatology test done while in [place] which were nondeterminant.  She complains of pauciarticular pain mostly in her forearms upper back and hips in a pattern more consistent with fibromyalgia.  Additionally complains of fatigue, brain fog and overlaps with IBS.    She had recent HIV and hep C testing through her family doctor in November 2024 which were both negative    Review of Systems   Constitutional:  Negative for chills, fever and unexpected weight change.   HENT:  Negative for congestion and  trouble swallowing.    Respiratory:  Negative for cough, shortness of breath and wheezing.    Cardiovascular:  Negative for chest pain.   Gastrointestinal:  Negative for abdominal distention, abdominal pain, blood in stool, constipation, diarrhea, nausea and vomiting.   Genitourinary:  Negative for difficulty urinating.   Musculoskeletal:  Negative for arthralgias and joint swelling.   Skin:  Negative for color change.   Neurological:  Negative for dizziness, speech difficulty, light-headedness and headaches.   Psychiatric/Behavioral:  Negative for confusion and sleep disturbance.        Objective   Physical Exam  Constitutional:       General: She is awake.      Appearance: Normal appearance.   HENT:      Head: Normocephalic and atraumatic.      Nose: Nose normal.      Mouth/Throat:      Mouth: Mucous membranes are moist.     Eyes:      Pupils: Pupils are equal, round, and reactive to light.     Neck:      Thyroid: No thyroid mass.      Trachea: Phonation normal.     Cardiovascular:      Rate and Rhythm: Normal rate and regular rhythm.   Pulmonary:      Effort: Pulmonary effort is normal. No respiratory distress.      Breath sounds: Normal air entry. No decreased breath sounds, wheezing, rhonchi or rales.   Abdominal:      General: Bowel sounds are normal. There is no distension.      Palpations: Abdomen is soft.      Tenderness: There is no abdominal tenderness.     Musculoskeletal:      Cervical back: Neck supple.      Right lower leg: No edema.      Left lower leg: No edema.     Skin:     General: Skin is warm.      Capillary Refill: Capillary refill takes less than 2 seconds.     Neurological:      General: No focal deficit present.      Mental Status: She is alert and oriented to person, place, and time. Mental status is at baseline.      Cranial Nerves: Cranial nerves 2-12 are intact.      Motor: Motor function is intact.     Psychiatric:         Attention and Perception: Attention and perception normal.          Mood and Affect: Mood normal.         Speech: Speech normal.         Behavior: Behavior normal.         Assessment/Plan   Diagnoses and all orders for this visit:  Positive JUAN MANUEL (antinuclear antibody)  -     LYME (B. BURGDORFERI) AB MODIFIED 2-TITER TESTING, WITH REFLEX TO IGM AND IGG BY LEIA; Future  -     Rheumatoid factor; Future  -     Complement, Total; Future  -     JUAN MANUEL with Reflex to CONCHITA; Future  -     Sedimentation Rate; Future  -     C-Reactive Protein; Future  -     C-Reactive Protein; Future  Pain in joint of right shoulder  Strain of right shoulder, sequela  Numbness and tingling in both hands  History of Lyme disease  -     LYME (B. BURGDORFERI) AB MODIFIED 2-TITER TESTING, WITH REFLEX TO IGM AND IGG BY LEIA; Future  Irritable bowel syndrome with diarrhea  -     rifAXIMin (Xifaxan) 550 mg tablet; Take 1 tablet (550 mg) by mouth 3 times a day for 14 days.    Continue Protonix for erosive esophagitis repeat endoscopy to assess for healing.  Arrange esophageal manometry.  Given her articular symptoms and history of Lyme disease we will check full battery of inflammatory markers including Lyme titer and refer to rheumatology if appropriate.  May have overlap with fibromyalgia given her symptoms of IBS.  For her IBS diarrhea will prescribe Xifaxan and follow-up after endoscopy is completed       Prabhu Machado DO 08/07/25 3:39 PM

## 2025-08-08 ENCOUNTER — TELEPHONE (OUTPATIENT)
Dept: PRIMARY CARE | Facility: CLINIC | Age: 50
End: 2025-08-08
Payer: MEDICAID

## 2025-08-13 ENCOUNTER — TELEPHONE (OUTPATIENT)
Dept: GASTROENTEROLOGY | Facility: CLINIC | Age: 50
End: 2025-08-13
Payer: MEDICAID

## 2025-08-13 DIAGNOSIS — K58.0 IRRITABLE BOWEL SYNDROME WITH DIARRHEA: Primary | ICD-10-CM

## 2025-08-13 RX ORDER — LOPERAMIDE HCL 2 MG
2 TABLET ORAL 4 TIMES DAILY PRN
Qty: 30 TABLET | Refills: 0 | Status: SHIPPED | OUTPATIENT
Start: 2025-08-13 | End: 2025-10-12

## (undated) DEVICE — GLOVE ORANGE PI 7   MSG9070

## (undated) DEVICE — COVER LT HNDL BLU PLAS

## (undated) DEVICE — APPLICATOR MEDICATED 26 CC SOLUTION HI LT ORNG CHLORAPREP

## (undated) DEVICE — GOWN,AURORA,NONRNF,XL,30/CS: Brand: MEDLINE

## (undated) DEVICE — LABEL MED MINI W/ MARKER

## (undated) DEVICE — CONMED SCOPE SAVER BITE BLOCK, 20X27 MM: Brand: SCOPE SAVER

## (undated) DEVICE — SPONGE GZ W4XL4IN COT 12 PLY TYP VII WVN C FLD DSGN

## (undated) DEVICE — 2000CC GUARDIAN II: Brand: GUARDIAN

## (undated) DEVICE — BANDAGE,GAUZE,BULKEE II,4.5"X4.1YD,STRL: Brand: MEDLINE

## (undated) DEVICE — GLOVE ORANGE PI 7 1/2   MSG9075

## (undated) DEVICE — LUBRICANT SURG JELLY ST BACTER TUBE 4.25OZ

## (undated) DEVICE — SUTURE ETHLN SZ 4-0 L18IN NONABSORBABLE BLK L19MM PS-2 3/8 1667H

## (undated) DEVICE — GOWN,AURORA,NONREINFORCED,LARGE: Brand: MEDLINE

## (undated) DEVICE — CONMED CHANNEL MASTER COMBINATION CLEANING BRUSH, 230 CM X 2.0 MM: Brand: CONMED

## (undated) DEVICE — WRAP COHESIVE W2INXL5YD TAN SELF ADH BNDG HND NON STERILE TEAR CARING

## (undated) DEVICE — TUBE SET 96 MM 64 MM H2O PERISTALTIC STD AUX CHANNEL

## (undated) DEVICE — SONY PRINTER PAPER

## (undated) DEVICE — GLOVE ORANGE PI 8   MSG9080

## (undated) DEVICE — DRESSING PETRO W3XL8IN OIL EMUL N ADH GZ KNIT IMPREG CELOS

## (undated) DEVICE — HAND II: Brand: MEDLINE INDUSTRIES, INC.

## (undated) DEVICE — ADAPTER FLSH PMP FLD MGMT GI IRRIG OFP 2 DISPOSABLE

## (undated) DEVICE — SPONGES GAUZE X-RAY 4X4 16PLY

## (undated) DEVICE — Device: Brand: ENDO SMARTCAP

## (undated) DEVICE — ENDO CARRY-ON PROCEDURE KIT: Brand: ENDO CARRY-ON PROCEDURE KIT